# Patient Record
Sex: FEMALE | Race: BLACK OR AFRICAN AMERICAN | NOT HISPANIC OR LATINO | Employment: OTHER | URBAN - NONMETROPOLITAN AREA
[De-identification: names, ages, dates, MRNs, and addresses within clinical notes are randomized per-mention and may not be internally consistent; named-entity substitution may affect disease eponyms.]

---

## 2017-01-11 ENCOUNTER — OFFICE VISIT (OUTPATIENT)
Dept: ENDOCRINOLOGY | Facility: CLINIC | Age: 62
End: 2017-01-11

## 2017-01-11 VITALS
DIASTOLIC BLOOD PRESSURE: 80 MMHG | HEART RATE: 70 BPM | BODY MASS INDEX: 44.32 KG/M2 | SYSTOLIC BLOOD PRESSURE: 140 MMHG | HEIGHT: 65 IN | WEIGHT: 266 LBS

## 2017-01-11 DIAGNOSIS — E04.1 NONTOXIC THYROID NODULE: Primary | ICD-10-CM

## 2017-01-11 DIAGNOSIS — E55.9 VITAMIN D DEFICIENCY: ICD-10-CM

## 2017-01-11 DIAGNOSIS — K59.00 CONSTIPATION, UNSPECIFIED CONSTIPATION TYPE: ICD-10-CM

## 2017-01-11 DIAGNOSIS — E06.3 HASHIMOTO'S DISEASE: ICD-10-CM

## 2017-01-11 DIAGNOSIS — E78.2 MIXED HYPERLIPIDEMIA: ICD-10-CM

## 2017-01-11 PROCEDURE — 99214 OFFICE O/P EST MOD 30 MIN: CPT | Performed by: NURSE PRACTITIONER

## 2017-01-11 NOTE — MR AVS SNAPSHOT
Rosa M Ferguson   1/11/2017 10:15 AM   Office Visit    Dept Phone:  [phone]   Encounter #:  [id number]    Provider:  RACHEL Zabala   Department:  [facility] ENDOCRINOLOGY                Your Full Care Plan              Your Updated Medication List          This list is accurate as of: 1/11/17 10:51 AM.  Always use your most recent med list.                atorvastatin 20 MG tablet   Commonly known as:  LIPITOR       BROMPHENIRAMINE-PSEUDOEPH PO       cefprozil 250 MG tablet   Commonly known as:  CEFZIL       desloratadine 5 MG tablet   Commonly known as:  CLARINEX       meloxicam 15 MG tablet   Commonly known as:  MOBIC   Take 1 tablet by mouth Daily. Take once daily.       NASONEX 50 MCG/ACT nasal spray   Generic drug:  mometasone       * Thyroid 30 MG tablet   Commonly known as:  ARMOUR       * Thyroid 60 MG tablet   Commonly known as:  ARMOUR       * thyroid 15 MG tablet   Commonly known as:  ARMOUR THYROID   Take 3 tabs in the am (45 mg) and 1 tab around 4pm (15mg )       vitamin D 25677 UNITS capsule capsule   Commonly known as:  ERGOCALCIFEROL       * Notice:  This list has 3 medication(s) that are the same as other medications prescribed for you. Read the directions carefully, and ask your doctor or other care provider to review them with you.            You Were Diagnosed With        Codes Comments    Nontoxic thyroid nodule    -  Primary ICD-10-CM: E04.1  ICD-9-CM: 241.0       Instructions     None    Patient Instructions History      Upcoming Appointments     Visit Type Date Time Department    FOLLOW UP 1/11/2017 10:15 AM [facility]    FOLLOW UP 7/11/2017  9:45 AM [facility]      [facility] Signup     [facility] [employer] allows you to send messages to your doctor, view your test results, renew your prescriptions, schedule appointments, and more. To sign up, go to [employer] and click on the Sign Up Now link  "in the New User? box. Enter your [employer] Activation Code exactly as it appears below along with the last four digits of your Social Security Number and your Date of Birth () to complete the sign-up process. If you do not sign up before the expiration date, you must request a new code.    [employer] Activation Code: [email or web address]  Expires: 2017 10:51 AM    If you have questions, you can email [email or web address] or call [phone] to talk to our [employer] staff. Remember, [employer] is NOT to be used for urgent needs. For medical emergencies, dial 911.               Other Info from Your Visit           Your Appointments     2017  9:45 AM CDT   Follow Up with RACHEL Zabala   [facility] MEDICAL [facility] ENDOCRINOLOGY (--)    [address]  [place] [address] [place] [id number] [phone]           Arrive 15 minutes prior to appointment.              Allergies     No Known Allergies      Reason for Visit     Hypothyroidism calvin      Vital Signs     Blood Pressure Pulse Height Weight Body Mass Index Smoking Status    140/80 (BP Location: Left arm, Patient Position: Sitting, Cuff Size: Adult) 70 65\" (165.1 cm) 266 lb (121 kg) 44.26 kg/m2 Never Smoker      Problems and Diagnoses Noted     Nontoxic thyroid nodule        "

## 2017-01-11 NOTE — PROGRESS NOTES
Subjective    Rosa M Ferguson is a 61 y.o. female. she is here today for follow-up.    History of Present Illness       History of Present Illness  61 year old female comes for follow up of hypothyroidism diagnosed in 1987.    She was on  brandname synthroid 150 mcgs daily that she takes every morning on an empty stomach.       since last appt has lost 3 lbs    In addition she has noticed visible goiter but US is stable    She denies eye pain,  red eye, diplopia, of eyelid edema.    since last appt changed to armour thyroid and states she does feels some better      In regards to bone health, she doesn't drink milk . She occasionally eats yogurt.       She has vitamin D Deficiency but hasn't been taking it consistently     She has dyslipidemia on pravachol --changed to atorovastatin--she does not remember to take everynight            The following portions of the patient's history were reviewed and updated as appropriate:   Past Medical History   Diagnosis Date   • Acquired hypothyroidism    • Astigmatism    • Contact dermatitis due to plants    • Dyslipidemia    • Elevated blood pressure reading without diagnosis of hypertension    • Generalized anxiety disorder    • Goiter    • Hashimoto's thyroiditis    • History of varicose veins    • Hypothyroidism    • Multinodular goiter    • Myopia    • Obesity    • Pain in eye      etiol unknown      • Plantar fasciitis    • Rash    • Thyroid nodule    • Upper respiratory infection    • Urinary tract infectious disease    • Vitamin D deficiency      Past Surgical History   Procedure Laterality Date   • Cystoscopy  09/23/1999     Cystoscopy and left retrograde study. Stone passed with uretherocele.   • Injection of medication  06/14/2016     Kenalog (1)     • Tubal abdominal ligation  01/25/1992     Bilateral partial salpingectomy. Desires sterilization.     Family History   Problem Relation Age of Onset   • Breast cancer Other    • Cancer Other    • Coronary artery  disease Other    • Diabetes Other      OB History     No data available        Current Outpatient Prescriptions   Medication Sig Dispense Refill   • atorvastatin (LIPITOR) 20 MG tablet Take 20 mg by mouth Every Night.     • BROMPHENIRAMINE-PSEUDOEPH PO Take 5 mL by mouth Every 6 (Six) Hours As Needed. brompheniramine-pseudoephedrine-DM 2 mg-30 mg-10 mg/5 mL syrup.     • cefprozil (CEFZIL) 250 MG tablet Take 250 mg by mouth 2 (Two) Times a Day.     • desloratadine (CLARINEX) 5 MG tablet Take 5 mg by mouth Daily.     • meloxicam (MOBIC) 15 MG tablet Take 1 tablet by mouth Daily. Take once daily. 30 tablet 0   • mometasone (NASONEX) 50 MCG/ACT nasal spray 2 sprays into each nostril Daily. Spray in each nostril.     • thyroid (ARMOUR THYROID) 15 MG tablet Take 3 tabs in the am (45 mg) and 1 tab around 4pm (15mg ) 120 tablet 5   • thyroid 30 MG PO tablet Take 30 mg by mouth Daily. Take daily at 4pm.     • thyroid 60 MG PO tablet Take 60 mg by mouth Every Morning.     • vitamin D (ERGOCALCIFEROL) 23191 UNITS capsule capsule Take 50,000 Units by mouth Every 7 (Seven) Days.       No current facility-administered medications for this visit.      No Known Allergies  Social History     Social History   • Marital status: Single     Spouse name: N/A   • Number of children: N/A   • Years of education: N/A     Social History Main Topics   • Smoking status: Never Smoker   • Smokeless tobacco: None   • Alcohol use No   • Drug use: None   • Sexual activity: Not Asked     Other Topics Concern   • None     Social History Narrative       Review of Systems  Review of Systems   Constitutional: Negative for activity change, appetite change, chills, diaphoresis and fatigue.   HENT: Negative for congestion, dental problem, drooling, ear discharge, ear pain, facial swelling, sneezing, sore throat, tinnitus, trouble swallowing and voice change.    Eyes: Negative for photophobia, pain, discharge, redness, itching and visual disturbance.  "  Respiratory: Negative for apnea, cough, choking, chest tightness and shortness of breath.    Cardiovascular: Negative for chest pain, palpitations and leg swelling.   Gastrointestinal: Negative for abdominal distention, abdominal pain, constipation, diarrhea, nausea and vomiting.   Endocrine: Negative for cold intolerance, heat intolerance, polydipsia, polyphagia and polyuria.   Genitourinary: Negative for difficulty urinating, dysuria, frequency, hematuria and urgency.   Musculoskeletal: Negative for arthralgias, back pain, gait problem, joint swelling, myalgias, neck pain and neck stiffness.   Skin: Negative for color change, pallor, rash and wound.   Allergic/Immunologic: Negative for environmental allergies, food allergies and immunocompromised state.   Neurological: Negative for dizziness, tremors, facial asymmetry, weakness, light-headedness, numbness and headaches.   Hematological: Negative for adenopathy. Does not bruise/bleed easily.   Psychiatric/Behavioral: Negative for agitation, behavioral problems, confusion, decreased concentration and sleep disturbance.        Objective      Visit Vitals   • /80 (BP Location: Left arm, Patient Position: Sitting, Cuff Size: Adult)   • Pulse 70   • Ht 65\" (165.1 cm)   • Wt 266 lb (121 kg)   • BMI 44.26 kg/m2     Physical Exam   Constitutional: She is oriented to person, place, and time. She appears well-developed and well-nourished. No distress.   HENT:   Head: Normocephalic and atraumatic.   Right Ear: External ear normal.   Left Ear: External ear normal.   Nose: Nose normal.   Eyes: Conjunctivae and EOM are normal. Pupils are equal, round, and reactive to light.   Neck: Normal range of motion. Neck supple. No tracheal deviation present. Thyromegaly present.   30 gram gland   Cardiovascular: Normal rate, regular rhythm and normal heart sounds.    No murmur heard.  Pulmonary/Chest: Effort normal and breath sounds normal. No respiratory distress. She has no " wheezes.   Abdominal: Soft. Bowel sounds are normal. There is no tenderness. There is no rebound and no guarding.   Musculoskeletal: Normal range of motion. She exhibits no edema, tenderness or deformity.   Neurological: She is alert and oriented to person, place, and time. No cranial nerve deficit.   Skin: Skin is warm and dry. No rash noted.   Psychiatric: She has a normal mood and affect. Her behavior is normal. Judgment and thought content normal.       Lab Review  GLUCOSE (mg/dl)   Date Value   01/04/2017 86   06/07/2016 86   12/01/2015 81     SODIUM (mmol/L)   Date Value   01/04/2017 139   06/07/2016 142   12/01/2015 141     POTASSIUM (mmol/L)   Date Value   01/04/2017 3.8   06/07/2016 4.1   12/01/2015 4.2     CHLORIDE (mmol/L)   Date Value   01/04/2017 104   06/07/2016 103   12/01/2015 105     CO2 (mmol/L)   Date Value   01/04/2017 23   06/07/2016 26   12/01/2015 25     BUN (mg/dl)   Date Value   01/04/2017 15   06/07/2016 13   12/01/2015 16     CREATININE (mg/dl)   Date Value   01/04/2017 0.7   06/07/2016 0.6   12/01/2015 0.6     TRIGLYCERIDES (mg/dl)   Date Value   06/07/2016 67   12/01/2015 56   05/19/2015 60       Assessment/Plan      1. Nontoxic thyroid nodule    2. Mixed hyperlipidemia    3. Hashimoto's disease    4. Vitamin D deficiency    5. Constipation, unspecified constipation type    .    Medications prescribed:  Outpatient Encounter Prescriptions as of 1/11/2017   Medication Sig Dispense Refill   • atorvastatin (LIPITOR) 20 MG tablet Take 20 mg by mouth Every Night.     • BROMPHENIRAMINE-PSEUDOEPH PO Take 5 mL by mouth Every 6 (Six) Hours As Needed. brompheniramine-pseudoephedrine-DM 2 mg-30 mg-10 mg/5 mL syrup.     • cefprozil (CEFZIL) 250 MG tablet Take 250 mg by mouth 2 (Two) Times a Day.     • desloratadine (CLARINEX) 5 MG tablet Take 5 mg by mouth Daily.     • meloxicam (MOBIC) 15 MG tablet Take 1 tablet by mouth Daily. Take once daily. 30 tablet 0   • mometasone (NASONEX) 50 MCG/ACT nasal  spray 2 sprays into each nostril Daily. Spray in each nostril.     • thyroid (ARMOUR THYROID) 15 MG tablet Take 3 tabs in the am (45 mg) and 1 tab around 4pm (15mg ) 120 tablet 5   • thyroid 30 MG PO tablet Take 30 mg by mouth Daily. Take daily at 4pm.     • thyroid 60 MG PO tablet Take 60 mg by mouth Every Morning.     • vitamin D (ERGOCALCIFEROL) 70584 UNITS capsule capsule Take 50,000 Units by mouth Every 7 (Seven) Days.       No facility-administered encounter medications on file as of 1/11/2017.        Orders placed during this encounter include:  Orders Placed This Encounter   Procedures   • Comprehensive Metabolic Panel   • Lipid Panel   • Vitamin D 25 Hydroxy   • TSH       Plan Details  Hypothyroidism    Euthyroid on LT4 at 150 mcgs daily     wants to try armour thyroid    start with 90 mcgs daily , do 60 a.m. and 30 p.m.     July 2014    TSH - nl    Oct. 2014    TSH - nl    continue armour thyroid 60 in the am and 30 around 4 pm    May 2015    TSH - 2.05    keep armour 60 in the am and 30 pm    Nov. 2015    TSH - nl      2012  barium swallow and PFT to evaluate for extrinsic compression were normal    Her Thyroid US from Sept 2011 was personally reviewed ( goiter with subcentimeter nodules ) - repeat for Oct 2013 enlarged gland , pseudonodules, left inf cyst of 1.3 cms , no need for FNA--will need repeat U/S -- -    oct. 2015    thyroid u/s     stable , questionable area in the left inferior lobe     repeat u/s in 6 months--schedule for thyroid nodule, goiter    June 2016    TSH - 0.23    keep armour thyroid 60mg in the am and decrease to 15mg in the pm    Jan. 2017    TSH - 2.9    Keep current dosage    June 2016    Reviewed by Dr. Munoz the left 1.8 cm cystic thyroid nodule did measure 1.5 cm --appears low risk for malignancy repeat in 6 months - scheduled for Jan. 13, 2017    --  I requested sleep study due to fatigue, elevated blood pressure, daytime sleepiness and snoring but she prefers not to do it  at this time.     --    Vid D def -     4-14 at 20 on 50 th q 2 weeks , sometimes forgetting.    increase to weekly --please try to remember daily    July 2014  VitD - 19    Oct. 2014    Vit d - 31    change to once every 2 weeks    May 2015    Vit d - 26.4       June 2016    vit d - 26.2    cannot remember vitamin d weekly    change to OTC 2000 units daily        --    Dyslipidemia  4-14    HDL 60s  LDL 170s  Tg nl      pravachol 40 mg qhs   stop     start atorvastatin 20 mg qhs -- does not take     May 2015    Total chol - 249  TG- 60  HDL - 61  LDL - 176    our target is to be less than 160   not taking  please remember to take     Nov. 2015    LDL - 180    not taking cholesterol pill    June 2016    total chol - 224  Tg- 67  HDL - 58  LDL _ 153    not taking cholesterol med--- please take daily      -------------------    Bowel habit changes     Now having problems constipation that has never had before     She never has had a colonoscopy     Increase water, fiber intake    Will refer to GI         4. Follow-up: Return in about 6 months (around 7/11/2017) for Recheck.

## 2017-01-13 ENCOUNTER — HOSPITAL ENCOUNTER (OUTPATIENT)
Dept: OTHER | Facility: HOSPITAL | Age: 62
Discharge: HOME | End: 2017-01-13
Attending: INTERNAL MEDICINE | Admitting: INTERNAL MEDICINE

## 2017-01-16 DIAGNOSIS — E04.1 SOLITARY THYROID NODULE: Primary | ICD-10-CM

## 2017-01-25 ENCOUNTER — HOSPITAL ENCOUNTER (OUTPATIENT)
Dept: ULTRASOUND IMAGING | Facility: HOSPITAL | Age: 62
Discharge: HOME | End: 2017-01-25
Attending: INTERNAL MEDICINE | Admitting: INTERNAL MEDICINE

## 2017-01-25 DIAGNOSIS — E04.1 SOLITARY THYROID NODULE: ICD-10-CM

## 2017-01-25 PROCEDURE — 88173 CYTOPATH EVAL FNA REPORT: CPT | Performed by: PATHOLOGY

## 2017-01-25 PROCEDURE — 76942 ECHO GUIDE FOR BIOPSY: CPT | Performed by: INTERNAL MEDICINE

## 2017-01-25 PROCEDURE — 10022 US GUIDED THYROID BIOPSY: CPT | Performed by: INTERNAL MEDICINE

## 2017-01-25 PROCEDURE — 76942 ECHO GUIDE FOR BIOPSY: CPT

## 2017-01-25 PROCEDURE — 88173 CYTOPATH EVAL FNA REPORT: CPT | Performed by: INTERNAL MEDICINE

## 2017-01-26 LAB
LAB AP CASE REPORT: NORMAL
LAB AP DIAGNOSIS COMMENT: NORMAL
LAB AP NON-GYN SPECIMEN ADEQUACY: NORMAL
Lab: NORMAL
PATH REPORT.FINAL DX SPEC: NORMAL

## 2017-02-27 ENCOUNTER — OFFICE VISIT (OUTPATIENT)
Dept: GASTROENTEROLOGY | Facility: CLINIC | Age: 62
End: 2017-02-27

## 2017-02-27 VITALS
WEIGHT: 267.1 LBS | BODY MASS INDEX: 44.5 KG/M2 | DIASTOLIC BLOOD PRESSURE: 74 MMHG | HEART RATE: 57 BPM | HEIGHT: 65 IN | SYSTOLIC BLOOD PRESSURE: 149 MMHG

## 2017-02-27 DIAGNOSIS — K59.00 CONSTIPATION, UNSPECIFIED CONSTIPATION TYPE: Primary | ICD-10-CM

## 2017-02-27 DIAGNOSIS — R19.4 CHANGE IN BOWEL HABITS: ICD-10-CM

## 2017-02-27 PROCEDURE — 99204 OFFICE O/P NEW MOD 45 MIN: CPT | Performed by: NURSE PRACTITIONER

## 2017-02-27 RX ORDER — SODIUM, POTASSIUM,MAG SULFATES 17.5-3.13G
1 SOLUTION, RECONSTITUTED, ORAL ORAL EVERY 12 HOURS
Qty: 2 BOTTLE | Refills: 0 | Status: ON HOLD | OUTPATIENT
Start: 2017-02-27 | End: 2017-03-06

## 2017-02-27 RX ORDER — DEXTROSE AND SODIUM CHLORIDE 5; .45 G/100ML; G/100ML
30 INJECTION, SOLUTION INTRAVENOUS CONTINUOUS PRN
Status: CANCELLED | OUTPATIENT
Start: 2017-02-27

## 2017-02-27 RX ORDER — LACTULOSE 10 G/15ML
10 SOLUTION ORAL 2 TIMES DAILY PRN
Qty: 473 ML | Refills: 1 | Status: SHIPPED | OUTPATIENT
Start: 2017-02-27 | End: 2017-02-27

## 2017-03-06 ENCOUNTER — ANESTHESIA EVENT (OUTPATIENT)
Dept: GASTROENTEROLOGY | Facility: HOSPITAL | Age: 62
End: 2017-03-06

## 2017-03-06 ENCOUNTER — ANESTHESIA (OUTPATIENT)
Dept: GASTROENTEROLOGY | Facility: HOSPITAL | Age: 62
End: 2017-03-06

## 2017-03-06 ENCOUNTER — HOSPITAL ENCOUNTER (OUTPATIENT)
Facility: HOSPITAL | Age: 62
Setting detail: HOSPITAL OUTPATIENT SURGERY
Discharge: HOME | End: 2017-03-06
Attending: INTERNAL MEDICINE | Admitting: INTERNAL MEDICINE

## 2017-03-06 VITALS
TEMPERATURE: 96.3 F | WEIGHT: 260.58 LBS | HEIGHT: 65 IN | BODY MASS INDEX: 43.42 KG/M2 | HEART RATE: 63 BPM | OXYGEN SATURATION: 98 % | RESPIRATION RATE: 18 BRPM | DIASTOLIC BLOOD PRESSURE: 59 MMHG | SYSTOLIC BLOOD PRESSURE: 114 MMHG

## 2017-03-06 DIAGNOSIS — K59.00 CONSTIPATION, UNSPECIFIED CONSTIPATION TYPE: ICD-10-CM

## 2017-03-06 PROCEDURE — 88305 TISSUE EXAM BY PATHOLOGIST: CPT | Performed by: PATHOLOGY

## 2017-03-06 PROCEDURE — 25010000002 PROPOFOL 10 MG/ML EMULSION: Performed by: NURSE ANESTHETIST, CERTIFIED REGISTERED

## 2017-03-06 PROCEDURE — 45378 DIAGNOSTIC COLONOSCOPY: CPT | Performed by: INTERNAL MEDICINE

## 2017-03-06 PROCEDURE — 88305 TISSUE EXAM BY PATHOLOGIST: CPT | Performed by: INTERNAL MEDICINE

## 2017-03-06 RX ORDER — ONDANSETRON 2 MG/ML
4 INJECTION INTRAMUSCULAR; INTRAVENOUS ONCE AS NEEDED
Status: DISCONTINUED | OUTPATIENT
Start: 2017-03-06 | End: 2017-03-06 | Stop reason: HOSPADM

## 2017-03-06 RX ORDER — MIDAZOLAM HYDROCHLORIDE 1 MG/ML
INJECTION INTRAMUSCULAR; INTRAVENOUS AS NEEDED
Status: DISCONTINUED | OUTPATIENT
Start: 2017-03-06 | End: 2017-03-06

## 2017-03-06 RX ORDER — PROMETHAZINE HYDROCHLORIDE 25 MG/ML
12.5 INJECTION, SOLUTION INTRAMUSCULAR; INTRAVENOUS ONCE AS NEEDED
Status: DISCONTINUED | OUTPATIENT
Start: 2017-03-06 | End: 2017-03-06 | Stop reason: HOSPADM

## 2017-03-06 RX ORDER — PROMETHAZINE HYDROCHLORIDE 25 MG/1
25 TABLET ORAL ONCE AS NEEDED
Status: DISCONTINUED | OUTPATIENT
Start: 2017-03-06 | End: 2017-03-06 | Stop reason: HOSPADM

## 2017-03-06 RX ORDER — PROPOFOL 10 MG/ML
VIAL (ML) INTRAVENOUS AS NEEDED
Status: DISCONTINUED | OUTPATIENT
Start: 2017-03-06 | End: 2017-03-06 | Stop reason: SURG

## 2017-03-06 RX ORDER — PROMETHAZINE HYDROCHLORIDE 25 MG/1
25 SUPPOSITORY RECTAL ONCE AS NEEDED
Status: DISCONTINUED | OUTPATIENT
Start: 2017-03-06 | End: 2017-03-06 | Stop reason: HOSPADM

## 2017-03-06 RX ORDER — DEXTROSE AND SODIUM CHLORIDE 5; .45 G/100ML; G/100ML
30 INJECTION, SOLUTION INTRAVENOUS CONTINUOUS PRN
Status: DISCONTINUED | OUTPATIENT
Start: 2017-03-06 | End: 2017-03-06 | Stop reason: HOSPADM

## 2017-03-06 RX ORDER — DEXAMETHASONE SODIUM PHOSPHATE 4 MG/ML
8 INJECTION, SOLUTION INTRA-ARTICULAR; INTRALESIONAL; INTRAMUSCULAR; INTRAVENOUS; SOFT TISSUE ONCE AS NEEDED
Status: DISCONTINUED | OUTPATIENT
Start: 2017-03-06 | End: 2017-03-06 | Stop reason: HOSPADM

## 2017-03-06 RX ADMIN — PROPOFOL 50 MG: 10 INJECTION, EMULSION INTRAVENOUS at 17:01

## 2017-03-06 RX ADMIN — PROPOFOL 100 MG: 10 INJECTION, EMULSION INTRAVENOUS at 16:55

## 2017-03-06 RX ADMIN — PROPOFOL 20 MG: 10 INJECTION, EMULSION INTRAVENOUS at 17:15

## 2017-03-06 RX ADMIN — PROPOFOL 50 MG: 10 INJECTION, EMULSION INTRAVENOUS at 17:06

## 2017-03-06 RX ADMIN — DEXTROSE AND SODIUM CHLORIDE 30 ML/HR: 5; 450 INJECTION, SOLUTION INTRAVENOUS at 16:22

## 2017-03-06 RX ADMIN — PROPOFOL 50 MG: 10 INJECTION, EMULSION INTRAVENOUS at 17:10

## 2017-03-06 NOTE — PLAN OF CARE
Problem: Patient Care Overview (Adult)  Goal: Plan of Care Review  Outcome: Outcome(s) achieved Date Met:  03/06/17 03/06/17 1757   Coping/Psychosocial Response Interventions   Plan Of Care Reviewed With patient   Patient Care Overview   Progress no change   Outcome Evaluation   Outcome Summary/Follow up Plan ready for d/c         Problem: GI Endoscopy (Adult)  Goal: Signs and Symptoms of Listed Potential Problems Will be Absent or Manageable (GI Endoscopy)  Outcome: Outcome(s) achieved Date Met:  03/06/17 03/06/17 1757   GI Endoscopy   Problems Assessed (GI Endoscopy) all   Problems Present (GI Endoscopy) none

## 2017-03-06 NOTE — PLAN OF CARE
Problem: Patient Care Overview (Adult)  Goal: Plan of Care Review  Outcome: Ongoing (interventions implemented as appropriate)    03/06/17 1724   Coping/Psychosocial Response Interventions   Plan Of Care Reviewed With patient   Patient Care Overview   Progress no change   Outcome Evaluation   Outcome Summary/Follow up Plan vss         Problem: GI Endoscopy (Adult)  Goal: Signs and Symptoms of Listed Potential Problems Will be Absent or Manageable (GI Endoscopy)  Outcome: Ongoing (interventions implemented as appropriate)    03/06/17 1724   GI Endoscopy   Problems Assessed (GI Endoscopy) all   Problems Present (GI Endoscopy) none

## 2017-03-06 NOTE — ANESTHESIA POSTPROCEDURE EVALUATION
Patient: Rosa M Ferguson    Procedure Summary     Date Anesthesia Start Anesthesia Stop Room / Location    03/06/17 [id number] [facility] ENDOSCOPY 1 / [facility] ENDOSCOPY       Procedure Diagnosis Surgeon Provider    COLONOSCOPY (N/A ) Constipation, unspecified constipation type  (Constipation, unspecified constipation type [K59.00]) MD Jennifer Madison CRNA          Anesthesia Type: MAC  Last vitals  BP      Temp      Pulse     Resp      SpO2        Post Anesthesia Care and Evaluation    Patient location during evaluation: bedside  Patient participation: complete - patient participated  Level of consciousness: awake and alert  Pain score: 0  Pain management: adequate  Airway patency: patent  Anesthetic complications: No anesthetic complications  PONV Status: none  Cardiovascular status: acceptable  Respiratory status: acceptable  Hydration status: acceptable

## 2017-03-06 NOTE — H&P (VIEW-ONLY)
Chief Complaint   Patient presents with   • Constipation     change in bowel habits       Subjective    Rosa M Ferguson is a 62 y.o. female. she is being seen for consultation today at the request of RACHEL Kay   Constipation   This is a chronic problem. The current episode started more than 1 month ago. The problem has been gradually worsening since onset. Her stool frequency is 2 to 3 times per week. The patient is on a high fiber diet. She exercises regularly. There has been adequate water intake. Associated symptoms include bloating. Pertinent negatives include no abdominal pain, anorexia, back pain, diarrhea, difficulty urinating, fecal incontinence, fever, flatus, hemorrhoids, melena, nausea, rectal pain, vomiting or weight loss. Risk factors: Reports symptoms started after pelvic surgery in 2011.  She has tried fiber, diet changes and laxatives for the symptoms. The treatment provided mild relief.    62-year-old female presents to discuss changes in bowel habits and constipation that is worsened in the last few months.  Date symptoms started when she had surgery in 2011 for a prolapsed bladder.  She denies any abdominal pain, nausea, vomiting she has had issues try to use the bathroom for the last 2-3 weeks has tried to increase water exercise and fruit juices and fiber daily.  Has tried over-the-counter MiraLAX it seemed to only and increase her gas.  Has never had a colonoscopy.  Denies any family history of colorectal cancer.  She denies any melena or hematochezia.  Reports since surgery she feels pressure in her vagina when she has an urge to have a bowel movement.  Plan; schedule colonoscopy, lactulose twice a day as needed for constipation.      The following portions of the patient's history were reviewed and updated as appropriate:   Past Medical History   Diagnosis Date   • Acquired hypothyroidism    • Astigmatism    • Contact dermatitis due to plants    • Dyslipidemia    • Elevated blood  pressure reading without diagnosis of hypertension    • Generalized anxiety disorder    • Goiter    • Hashimoto's thyroiditis    • History of varicose veins    • Hypothyroidism    • Multinodular goiter    • Myopia    • Obesity    • Pain in eye      etiol unknown      • Plantar fasciitis    • Rash    • Thyroid nodule    • Upper respiratory infection    • Urinary tract infectious disease    • Vitamin D deficiency      Past Surgical History   Procedure Laterality Date   • Cystoscopy  09/23/1999     Cystoscopy and left retrograde study. Stone passed with uretherocele.   • Injection of medication  06/14/2016     Kenalog (1)     • Tubal abdominal ligation  01/25/1992     Bilateral partial salpingectomy. Desires sterilization.     Family History   Problem Relation Age of Onset   • Breast cancer Other    • Cancer Other    • Coronary artery disease Other    • Diabetes Other    • Cancer Mother    • Heart disease Father    • Stroke Paternal Uncle      OB History     No data available        Current Outpatient Prescriptions   Medication Sig Dispense Refill   • atorvastatin (LIPITOR) 20 MG tablet Take 20 mg by mouth Every Night.     • meloxicam (MOBIC) 15 MG tablet Take 1 tablet by mouth Daily. Take once daily. 30 tablet 0   • thyroid (ARMOUR THYROID) 15 MG tablet Take 3 tabs in the am (45 mg) and 1 tab around 4pm (15mg ) 120 tablet 5   • vitamin D (ERGOCALCIFEROL) 39204 UNITS capsule capsule Take 50,000 Units by mouth Every 7 (Seven) Days.     • lactulose (CHRONULAC) 10 GM/15ML solution Take 15 mL by mouth 2 (Two) Times a Day As Needed (Constipation). 473 mL 1   • sodium-potassium-magnesium sulfates (SUPREP BOWEL PREP) solution oral solution Take 1 bottle by mouth Every 12 (Twelve) Hours. 2 bottle 0   • thyroid 60 MG PO tablet Take 60 mg by mouth Every Morning.       No current facility-administered medications for this visit.      No Known Allergies  Social History     Social History   • Marital status: Single     Spouse name:  "N/A   • Number of children: N/A   • Years of education: N/A     Social History Main Topics   • Smoking status: Never Smoker   • Smokeless tobacco: Not on file   • Alcohol use Yes      Comment: some   • Drug use: No   • Sexual activity: Not on file     Other Topics Concern   • Not on file     Social History Narrative       Review of Systems  Review of Systems   Constitutional: Negative for activity change, appetite change, chills, diaphoresis, fatigue, fever, unexpected weight change and weight loss.   HENT: Negative for sore throat and trouble swallowing.    Respiratory: Negative for shortness of breath.    Gastrointestinal: Positive for bloating and constipation. Negative for abdominal distention, abdominal pain, anal bleeding, anorexia, blood in stool, diarrhea, flatus, hemorrhoids, melena, nausea, rectal pain and vomiting.   Genitourinary: Negative for difficulty urinating.   Musculoskeletal: Negative for arthralgias and back pain.   Skin: Negative for pallor.   Neurological: Negative for light-headedness.        Visit Vitals   • /74   • Pulse 57   • Ht 65\" (165.1 cm)   • Wt 267 lb 1.6 oz (121 kg)   • BMI 44.45 kg/m2       Objective    Physical Exam   Constitutional: She is oriented to person, place, and time. She appears well-developed and well-nourished. She is cooperative. No distress.   HENT:   Head: Normocephalic and atraumatic.   Neck: Normal range of motion. Neck supple. No thyromegaly present.   Cardiovascular: Normal rate, regular rhythm and normal heart sounds.    Pulmonary/Chest: Effort normal and breath sounds normal. She has no wheezes. She has no rhonchi. She has no rales.   Abdominal: Soft. Normal appearance and bowel sounds are normal. She exhibits no shifting dullness, no distension, no fluid wave and no ascites. There is no hepatosplenomegaly. There is no tenderness. There is no rigidity and no guarding. No hernia.   Lymphadenopathy:     She has no cervical adenopathy.   Neurological: She " is alert and oriented to person, place, and time.   Skin: Skin is warm, dry and intact. No rash noted. No pallor.   Psychiatric: She has a normal mood and affect. Her speech is normal.     Hospital Outpatient Visit on 01/25/2017   Component Date Value Ref Range Status   • Case Report 01/25/2017    Final                    Value:Medical Cytology Report                           Case: [id number]                                 Authorizing Provider:  Harry Person,   Collected:           01/25/2017 02:24 PM                                 MD                                                                           Ordering Location:     [facility]             Received:            01/25/2017 02:46 PM                                 [facility]                                                              Pathologist:           Basil Youngblood MD                                                         Specimen:    Thyroid, left                                                                             • Final Diagnosis 01/25/2017    Final                    Value:This result contains rich text formatting which cannot be displayed here.   • Comment 01/25/2017    Final                    Value:This result contains rich text formatting which cannot be displayed here.   • Specimen Adequacy 01/25/2017 Unsatisfactory for evaluation. See comment.   Final    This result contains rich text formatting which cannot be displayed here.     Assessment/Plan      1. Constipation, unspecified constipation type    2. Change in bowel habits    .     Orders placed during this encounter include:      COLONOSCOPY (N/A)    Review and/or summary of lab tests, radiology, procedures, medications. Review and summary of old records and obtaining of history. The risks and benefits of my recommendations, as well as other treatment options were discussed with the patient today. Questions were answered.    New Medications Ordered  This Visit   Medications   • sodium-potassium-magnesium sulfates (SUPREP BOWEL PREP) solution oral solution     Sig: Take 1 bottle by mouth Every 12 (Twelve) Hours.     Dispense:  2 bottle     Refill:  0   • lactulose (CHRONULAC) 10 GM/15ML solution     Sig: Take 15 mL by mouth 2 (Two) Times a Day As Needed (Constipation).     Dispense:  473 mL     Refill:  1       Follow-up: Return in about 4 weeks (around 3/27/2017).          This document has been electronically signed by RACHEL Lowry on February 27, 2017 11:29 AM             Results for orders placed or performed during the hospital encounter of 01/25/17   Fine Needle Aspiration   Result Value Ref Range    Case Report       Medical Cytology Report                           Case: [id number]                                 Authorizing Provider:  Harry Person,   Collected:           01/25/2017 02:24 PM                                 MD                                                                           Ordering Location:     [facility]             Received:            01/25/2017 02:46 PM                                 [facility]                                                              Pathologist:           Basil Youngblood MD                                                         Specimen:    Thyroid, left                                                                              Final Diagnosis       [facility] Category: NON-DIAGNOSTIC               Comment       Follicular cells not identified. Abundant lymphocytes and colloid present.       Specimen Adequacy Unsatisfactory for evaluation. See comment.     Embedded Images     Results for orders placed or performed during the hospital encounter of 01/04/17   Manual Differential   Result Value Ref Range    Neutrophil Rel % 37 37 - 80 %    Bands Rel %  2 (L) 3 - 5 %    Lymphocyte Rel % 47 10 - 50 %    Monocyte Rel % 11 0 - 12 %    Eosinophil Rel % 1 0 - 7 %     Basophil Rel % 2 0 - 2 %    RBC Morphology Normochromic Slight Anisocytosis Occ Polychromia     Platelet Estimate Normal     Total Counted 100    CBC & Differential   Result Value Ref Range    WBC 4.6 3.2 - 9.8 x1000/uL    RBC 4.34 3.77 - 5.16 mikey/mm3    Hemoglobin 13.1 12.0 - 15.5 gm/dl    Hematocrit 39.1 35.0 - 45.0 %    MCV 90.1 80.0 - 98.0 fl    MCH 30.2 26.0 - 34.0 pg    MCHC 33.5 31.4 - 36.0 gm/dl    RDW 13.2 11.5 - 14.5 %    Platelets 252 150 - 450 x1000/mm3    MPV 10.9 8.0 - 12.0 fl    Neutrophil Rel % 39.4 37.0 - 80.0 %    Lymphocyte Rel % 47.6 10.0 - 50.0 %    Monocyte Rel % 10.4 0.0 - 12.0 %    Eosinophil Rel % 2.4 0.0 - 7.0 %    Basophil Rel % 0.2 0.0 - 2.0 %    Immature Granulocyte Rel % 0.00 0.00 - 0.50 %    Neutrophils Absolute 1.82 (L) 2.00 - 8.60 x1000/uL    Lymphocytes Absolute 2.20 0.60 - 4.20 x1000/uL    Monocytes Absolute 0.48 0.00 - 0.90 x1000/uL    Eosinophils Absolute 0.11 0.00 - 0.70 x1000/uL    Basophils Absolute 0.01 0.00 - 0.20 x1000/uL    Immature Granulocytes Absolute 0.000 (L) 0.005 - 0.022 x1000/uL    nRBC 0.0 0.0 - 0.2 %    nRBC 0.000 x1000/uL   TSH   Result Value Ref Range    TSH 2.92 0.46 - 4.68 uIU/ml   Comprehensive Metabolic Panel   Result Value Ref Range    Sodium 139 137 - 145 mmol/L    Potassium 3.8 3.5 - 5.1 mmol/L    Chloride 104 95 - 110 mmol/L    CO2 23 22 - 31 mmol/L    Anion Gap 12.0 5.0 - 15.0 mmol/L    Glucose 86 60 - 100 mg/dl    BUN 15 7 - 21 mg/dl    Creatinine 0.7 0.5 - 1.0 mg/dl    GFR MDRD Non  85 45 - 104 mL/min/1.73 sq.M    GFR MDRD  103 45 - 104 mL/min/1.73 sq.M    Calcium 9.2 8.4 - 10.2 mg/dl    Total Protein 7.9 6.3 - 8.6 gm/dl    Albumin 3.9 3.4 - 4.8 gm/dl    Total Bilirubin 0.6 0.2 - 1.3 mg/dl    Alkaline Phosphatase 98 38 - 126 U/L    AST (SGOT) 24 14 - 36 U/L    ALT (SGPT) 36 9 - 52 U/L   Results for orders placed or performed during the hospital encounter of 08/26/16   TSH   Result Value Ref Range    TSH 0.43 (L) 0.46  - 4.68 uIU/ml   Results for orders placed or performed during the hospital encounter of 06/07/16   Vitamin D 25 hydroxy   Result Value Ref Range    25 Hydroxy, Vitamin D 24.2 (L) 30.0 - 100.0 ng/ml   Manual Differential   Result Value Ref Range    Neutrophil Rel % 37 37 - 80 %    Lymphocyte Rel % 60 (H) 10 - 50 %    Eosinophil Rel % 3 0 - 7 %    RBC Morphology Normocytic Normochromic     Platelet Estimate Normal     Total Counted 100    CBC and Differential   Result Value Ref Range    WBC 4.7 3.2 - 9.8 x1000/uL    RBC 4.09 3.77 - 5.16 mikey/mm3    Hemoglobin 12.1 12.0 - 15.5 gm/dl    Hematocrit 36.4 35.0 - 45.0 %    MCV 89.0 80.0 - 98.0 fl    MCH 29.6 26.0 - 34.0 pg    MCHC 33.2 31.4 - 36.0 gm/dl    RDW 13.6 11.5 - 14.5 %    Platelets 242 150 - 450 x1000/mm3    MPV 11.2 8.0 - 12.0 fl    Neutrophil Rel % 36.0 (L) 37.0 - 80.0 %    Lymphocyte Rel % 50.1 (H) 10.0 - 50.0 %    Monocyte Rel % 9.7 0.0 - 12.0 %    Eosinophil Rel % 3.8 0.0 - 7.0 %    Basophil Rel % 0.2 0.0 - 2.0 %    Immature Granulocyte Rel % 0.20 0.00 - 0.50 %    Neutrophils Absolute 1.70 (L) 2.00 - 8.60 x1000/uL    Lymphocytes Absolute 2.37 0.60 - 4.20 x1000/uL    Monocytes Absolute 0.46 0.00 - 0.90 x1000/uL    Eosinophils Absolute 0.18 0.00 - 0.70 x1000/uL    Basophils Absolute 0.01 0.00 - 0.20 x1000/uL    Immature Granulocytes Absolute 0.010 0.005 - 0.022 x1000/uL    nRBC 0.0 0.0 - 0.2 %    nRBC 0.000 x1000/uL   TSH   Result Value Ref Range    TSH 0.26 (L) 0.46 - 4.68 uIU/ml   T4, free   Result Value Ref Range    Free T4 1.12 0.78 - 2.19 ng/dl   Lipid panel   Result Value Ref Range    Total Cholesterol 224 (H) 0 - 199 mg/dl    Triglycerides 67 20 - 199 mg/dl    HDL Cholesterol 58 (L) 60 - 200 mg/dl    LDL Cholesterol  153 (H) 0 - 129 mg/dl   Comprehensive metabolic panel   Result Value Ref Range    Sodium 142 137 - 145 mmol/L    Potassium 4.1 3.5 - 5.1 mmol/L    Chloride 103 95 - 110 mmol/L    CO2 26 22 - 31 mmol/L    Anion Gap 13.0 5.0 - 15.0 mmol/L     Glucose 86 60 - 100 mg/dl    BUN 13 7 - 21 mg/dl    Creatinine 0.6 0.5 - 1.0 mg/dl    GFR MDRD Non  102 45 - 104 mL/min/1.73 sq.M    GFR MDRD  123 (H) 45 - 104 mL/min/1.73 sq.M    Calcium 9.5 8.4 - 10.2 mg/dl    Total Protein 7.6 6.3 - 8.6 gm/dl    Albumin 3.9 3.4 - 4.8 gm/dl    Total Bilirubin 0.5 0.2 - 1.3 mg/dl    Alkaline Phosphatase 96 38 - 126 U/L    AST (SGOT) 29 14 - 36 U/L    ALT (SGPT) 39 9 - 52 U/L     *Note: Due to a large number of results and/or encounters for the requested time period, some results have not been displayed. A complete set of results can be found in Results Review.

## 2017-03-06 NOTE — ANESTHESIA PREPROCEDURE EVALUATION
Anesthesia Evaluation     NPO Status: > 4 hours   Airway   Mallampati: I  TM distance: >3 FB  Neck ROM: full  no difficulty expected  Dental - normal exam     Pulmonary - normal exam   (+) recent URI,   Cardiovascular - negative cardio ROS and normal exam        Neuro/Psych- negative ROS  GI/Hepatic/Renal/Endo    (+) obesity,  hypothyroidism,     Musculoskeletal (-) negative ROS    Abdominal    Substance History - negative use     OB/GYN negative ob/gyn ROS         Other - negative ROS                                   Anesthesia Plan    ASA 2     MAC     Anesthetic plan and risks discussed with patient.

## 2017-03-13 LAB
LAB AP CASE REPORT: NORMAL
Lab: NORMAL
PATH REPORT.FINAL DX SPEC: NORMAL
PATH REPORT.GROSS SPEC: NORMAL

## 2017-05-03 ENCOUNTER — OFFICE VISIT (OUTPATIENT)
Dept: GASTROENTEROLOGY | Facility: CLINIC | Age: 62
End: 2017-05-03

## 2017-05-03 VITALS
HEIGHT: 65 IN | BODY MASS INDEX: 44.45 KG/M2 | DIASTOLIC BLOOD PRESSURE: 83 MMHG | HEART RATE: 55 BPM | SYSTOLIC BLOOD PRESSURE: 155 MMHG | WEIGHT: 266.8 LBS

## 2017-05-03 DIAGNOSIS — K59.00 CONSTIPATION, UNSPECIFIED CONSTIPATION TYPE: Primary | ICD-10-CM

## 2017-05-03 PROCEDURE — 99213 OFFICE O/P EST LOW 20 MIN: CPT | Performed by: NURSE PRACTITIONER

## 2017-05-03 RX ORDER — LACTULOSE 10 G/15ML
20 SOLUTION ORAL 2 TIMES DAILY
Qty: 473 ML | Refills: 3 | Status: SHIPPED | OUTPATIENT
Start: 2017-05-03 | End: 2017-08-29 | Stop reason: ALTCHOICE

## 2017-07-03 ENCOUNTER — APPOINTMENT (OUTPATIENT)
Dept: LAB | Facility: HOSPITAL | Age: 62
End: 2017-07-03

## 2017-07-03 LAB
25(OH)D3 SERPL-MCNC: 36.5 NG/ML (ref 30–100)
ALBUMIN SERPL-MCNC: 4.3 G/DL (ref 3.4–4.8)
ALBUMIN/GLOB SERPL: 1 G/DL (ref 1.1–1.8)
ALP SERPL-CCNC: 112 U/L (ref 38–126)
ALT SERPL W P-5'-P-CCNC: 39 U/L (ref 9–52)
ANION GAP SERPL CALCULATED.3IONS-SCNC: 13 MMOL/L (ref 5–15)
ARTICHOKE IGE QN: 153 MG/DL (ref 1–129)
AST SERPL-CCNC: 32 U/L (ref 14–36)
BASOPHILS # BLD AUTO: 0.02 10*3/MM3 (ref 0–0.2)
BASOPHILS NFR BLD AUTO: 0.4 % (ref 0–2)
BILIRUB SERPL-MCNC: 0.6 MG/DL (ref 0.2–1.3)
BUN BLD-MCNC: 12 MG/DL (ref 7–21)
BUN/CREAT SERPL: 16 (ref 7–25)
CALCIUM SPEC-SCNC: 9.3 MG/DL (ref 8.4–10.2)
CHLORIDE SERPL-SCNC: 104 MMOL/L (ref 95–110)
CHOLEST SERPL-MCNC: 278 MG/DL (ref 0–199)
CO2 SERPL-SCNC: 25 MMOL/L (ref 22–31)
CREAT BLD-MCNC: 0.75 MG/DL (ref 0.5–1)
DEPRECATED RDW RBC AUTO: 45.6 FL (ref 36.4–46.3)
EOSINOPHIL # BLD AUTO: 0.11 10*3/MM3 (ref 0–0.7)
EOSINOPHIL NFR BLD AUTO: 2.3 % (ref 0–7)
ERYTHROCYTE [DISTWIDTH] IN BLOOD BY AUTOMATED COUNT: 13.6 % (ref 11.5–14.5)
GFR SERPL CREATININE-BSD FRML MDRD: 95 ML/MIN/1.73 (ref 45–104)
GLOBULIN UR ELPH-MCNC: 4.2 GM/DL (ref 2.3–3.5)
GLUCOSE BLD-MCNC: 92 MG/DL (ref 60–100)
HCT VFR BLD AUTO: 38.4 % (ref 35–45)
HDLC SERPL-MCNC: 62 MG/DL (ref 60–200)
HGB BLD-MCNC: 12.7 G/DL (ref 12–15.5)
IMM GRANULOCYTES # BLD: 0.01 10*3/MM3 (ref 0–0.02)
IMM GRANULOCYTES NFR BLD: 0.2 % (ref 0–0.5)
LDLC/HDLC SERPL: 3.25 {RATIO} (ref 0–3.22)
LYMPHOCYTES # BLD AUTO: 2.41 10*3/MM3 (ref 0.6–4.2)
LYMPHOCYTES NFR BLD AUTO: 49.6 % (ref 10–50)
MCH RBC QN AUTO: 30.4 PG (ref 26.5–34)
MCHC RBC AUTO-ENTMCNC: 33.1 G/DL (ref 31.4–36)
MCV RBC AUTO: 91.9 FL (ref 80–98)
MONOCYTES # BLD AUTO: 0.37 10*3/MM3 (ref 0–0.9)
MONOCYTES NFR BLD AUTO: 7.6 % (ref 0–12)
NEUTROPHILS # BLD AUTO: 1.94 10*3/MM3 (ref 2–8.6)
NEUTROPHILS NFR BLD AUTO: 39.9 % (ref 37–80)
PLATELET # BLD AUTO: 254 10*3/MM3 (ref 150–450)
PMV BLD AUTO: 11.6 FL (ref 8–12)
POTASSIUM BLD-SCNC: 3.8 MMOL/L (ref 3.5–5.1)
PROT SERPL-MCNC: 8.5 G/DL (ref 6.3–8.6)
RBC # BLD AUTO: 4.18 10*6/MM3 (ref 3.77–5.16)
SODIUM BLD-SCNC: 142 MMOL/L (ref 137–145)
TRIGL SERPL-MCNC: 73 MG/DL (ref 20–199)
TSH SERPL DL<=0.05 MIU/L-ACNC: 1.72 MIU/ML (ref 0.46–4.68)
WBC NRBC COR # BLD: 4.86 10*3/MM3 (ref 3.2–9.8)

## 2017-07-03 PROCEDURE — 80061 LIPID PANEL: CPT | Performed by: NURSE PRACTITIONER

## 2017-07-03 PROCEDURE — 84443 ASSAY THYROID STIM HORMONE: CPT | Performed by: NURSE PRACTITIONER

## 2017-07-03 PROCEDURE — 36415 COLL VENOUS BLD VENIPUNCTURE: CPT | Performed by: NURSE PRACTITIONER

## 2017-07-03 PROCEDURE — 82306 VITAMIN D 25 HYDROXY: CPT | Performed by: NURSE PRACTITIONER

## 2017-07-03 PROCEDURE — 80053 COMPREHEN METABOLIC PANEL: CPT | Performed by: NURSE PRACTITIONER

## 2017-07-03 PROCEDURE — 85025 COMPLETE CBC W/AUTO DIFF WBC: CPT | Performed by: NURSE PRACTITIONER

## 2017-07-11 ENCOUNTER — OFFICE VISIT (OUTPATIENT)
Dept: ENDOCRINOLOGY | Facility: CLINIC | Age: 62
End: 2017-07-11

## 2017-07-11 VITALS
DIASTOLIC BLOOD PRESSURE: 78 MMHG | SYSTOLIC BLOOD PRESSURE: 124 MMHG | HEIGHT: 65 IN | HEART RATE: 65 BPM | BODY MASS INDEX: 45.15 KG/M2 | WEIGHT: 271 LBS

## 2017-07-11 DIAGNOSIS — E78.2 MIXED HYPERLIPIDEMIA: ICD-10-CM

## 2017-07-11 DIAGNOSIS — E04.1 NONTOXIC THYROID NODULE: Primary | ICD-10-CM

## 2017-07-11 DIAGNOSIS — E06.3 HASHIMOTO'S DISEASE: ICD-10-CM

## 2017-07-11 DIAGNOSIS — E55.9 VITAMIN D DEFICIENCY: ICD-10-CM

## 2017-07-11 DIAGNOSIS — R14.0 BLOATING: ICD-10-CM

## 2017-07-11 PROCEDURE — 99214 OFFICE O/P EST MOD 30 MIN: CPT | Performed by: NURSE PRACTITIONER

## 2017-07-11 RX ORDER — LEVOTHYROXINE SODIUM 112 UG/1
112 CAPSULE ORAL DAILY
Qty: 90 CAPSULE | Refills: 3 | Status: SHIPPED | OUTPATIENT
Start: 2017-07-11 | End: 2017-08-15 | Stop reason: CLARIF

## 2017-07-11 NOTE — PROGRESS NOTES
Subjective    Rosa M Ferguson is a 62 y.o. female. she is here today for follow-up.    History of Present Illness       History of Present Illness  61 year old female comes for follow up of hypothyroidism diagnosed in 1987.     She was on  brandname synthroid 150 mcgs daily that she takes every morning on an empty stomach.         since last appt has lost 3 lbs     In addition she has noticed visible goiter but US is stable     She denies eye pain,  red eye, diplopia, of eyelid edema.     since last appt changed to armour thyroid and states she does feels some better        In regards to bone health, she doesn't drink milk . She occasionally eats yogurt.         She has vitamin D Deficiency but hasn't been taking it consistently      She has dyslipidemia on pravachol --changed to atorovastatin--she does not remember to take everynight          Evaluation history:  TSH   Date Value Ref Range Status   07/03/2017 1.720 0.460 - 4.680 mIU/mL Final     Free T4   Date Value Ref Range Status   06/07/2016 1.12 0.78 - 2.19 ng/dl Final       Current medications:  Current Outpatient Prescriptions   Medication Sig Dispense Refill   • atorvastatin (LIPITOR) 20 MG tablet Take 20 mg by mouth Every Night.     • lactulose (CHRONULAC) 10 GM/15ML solution Take 30 mL by mouth 2 (Two) Times a Day. 473 mL 3   • meloxicam (MOBIC) 15 MG tablet Take 1 tablet by mouth Daily. Take once daily. (Patient taking differently: Take 15 mg by mouth Daily As Needed. Take once daily.) 30 tablet 0   • thyroid (ARMOUR THYROID) 15 MG tablet Take 3 tabs in the am (45 mg) and 1 tab around 4pm (15mg ) 120 tablet 5   • TIROSINT 112 MCG capsule Take 1 capsule by mouth Daily. 90 capsule 3   • vitamin D (ERGOCALCIFEROL) 08292 UNITS capsule capsule Take 50,000 Units by mouth Every 7 (Seven) Days.       No current facility-administered medications for this visit.        The following portions of the patient's history were reviewed and updated as appropriate:    Past Medical History:   Diagnosis Date   • Acquired hypothyroidism    • Astigmatism    • Contact dermatitis due to plants    • Dyslipidemia    • Elevated blood pressure reading without diagnosis of hypertension    • Generalized anxiety disorder    • Goiter    • Hashimoto's thyroiditis    • History of varicose veins    • Hypothyroidism    • Multinodular goiter    • Myopia    • Obesity    • Pain in eye     etiol unknown      • Plantar fasciitis    • Rash    • Thyroid nodule    • Upper respiratory infection    • Urinary tract infectious disease    • Vitamin D deficiency      Past Surgical History:   Procedure Laterality Date   • BLADDER SUSPENSION  2011   • COLONOSCOPY N/A 3/6/2017    Procedure: COLONOSCOPY;  Surgeon: Edis Schaffer MD;  Location: [facility] ENDOSCOPY;  Service:    • CYSTOSCOPY  09/23/1999    Cystoscopy and left retrograde study. Stone passed with uretherocele.   • INJECTION OF MEDICATION  06/14/2016    Kenalog (1)     • TUBAL ABDOMINAL LIGATION  01/25/1992    Bilateral partial salpingectomy. Desires sterilization.     Family History   Problem Relation Age of Onset   • Breast cancer Other    • Cancer Other    • Coronary artery disease Other    • Diabetes Other    • Cancer Mother    • Heart disease Father    • Stroke Paternal Uncle      OB History     No data available        No Known Allergies  Social History     Social History   • Marital status: Single     Spouse name: N/A   • Number of children: N/A   • Years of education: N/A     Social History Main Topics   • Smoking status: Never Smoker   • Smokeless tobacco: Never Used   • Alcohol use Yes      Comment: socially   • Drug use: No   • Sexual activity: Not Asked     Other Topics Concern   • None     Social History Narrative       Review of Systems  Review of Systems   Constitutional: Negative for activity change, appetite change, chills, diaphoresis and fatigue.   HENT: Negative for congestion, dental problem, drooling, ear discharge, ear pain,  "facial swelling, sneezing, sore throat, tinnitus, trouble swallowing and voice change.    Eyes: Negative for photophobia, pain, discharge, redness, itching and visual disturbance.   Respiratory: Negative for apnea, cough, choking, chest tightness and shortness of breath.    Cardiovascular: Negative for chest pain, palpitations and leg swelling.   Gastrointestinal: Negative for abdominal distention, abdominal pain, constipation, diarrhea, nausea and vomiting.   Endocrine: Negative for cold intolerance, heat intolerance, polydipsia, polyphagia and polyuria.   Genitourinary: Negative for difficulty urinating, dysuria, frequency, hematuria and urgency.   Musculoskeletal: Negative for arthralgias, back pain, gait problem, joint swelling, myalgias, neck pain and neck stiffness.   Skin: Negative for color change, pallor, rash and wound.   Allergic/Immunologic: Negative for environmental allergies, food allergies and immunocompromised state.   Neurological: Negative for dizziness, tremors, facial asymmetry, weakness, light-headedness, numbness and headaches.   Hematological: Negative for adenopathy. Does not bruise/bleed easily.   Psychiatric/Behavioral: Negative for agitation, behavioral problems, confusion and sleep disturbance.        Objective    /78 (BP Location: Right arm, Patient Position: Sitting, Cuff Size: Adult)  Pulse 65  Ht 65\" (165.1 cm)  Wt 271 lb (123 kg)  BMI 45.1 kg/m2  Physical Exam   Constitutional: She is oriented to person, place, and time. She appears well-developed and well-nourished. No distress.   HENT:   Head: Normocephalic and atraumatic.   Right Ear: External ear normal.   Left Ear: External ear normal.   Nose: Nose normal.   Eyes: Conjunctivae and EOM are normal. Pupils are equal, round, and reactive to light.   Neck: Normal range of motion. Neck supple. No tracheal deviation present. No thyromegaly present.   Cardiovascular: Normal rate, regular rhythm and normal heart sounds.    No " murmur heard.  Pulmonary/Chest: Effort normal and breath sounds normal. No respiratory distress. She has no wheezes.   Abdominal: Soft. Bowel sounds are normal. There is no tenderness. There is no rebound and no guarding.   Musculoskeletal: Normal range of motion. She exhibits no edema, tenderness or deformity.   Neurological: She is alert and oriented to person, place, and time. No cranial nerve deficit.   Skin: Skin is warm and dry. No rash noted.   Psychiatric: She has a normal mood and affect. Her behavior is normal. Judgment and thought content normal.       Lab Review  Lab Results   Component Value Date    TSH 1.720 07/03/2017     Lab Results   Component Value Date    FREET4 1.12 06/07/2016        Assessment/Plan      1. Nontoxic thyroid nodule    2. Hashimoto's disease    3. Mixed hyperlipidemia    4. Vitamin D deficiency    5. Bloating    . This diagnosis was discussed and reviewed with the patient including the advantages of drug therapy.     1. Orders placed during this encounter include:  Orders Placed This Encounter   Procedures   • TSH   • Ambulatory Referral to Gastroenterology     Referral Priority:   Routine     Referral Type:   Consultation     Referral Reason:   Specialty Services Required     Referred to Provider:   Edis Schaffer MD     Requested Specialty:   Gastroenterology     Number of Visits Requested:   1       Medications prescribed:  Outpatient Encounter Prescriptions as of 7/11/2017   Medication Sig Dispense Refill   • atorvastatin (LIPITOR) 20 MG tablet Take 20 mg by mouth Every Night.     • lactulose (CHRONULAC) 10 GM/15ML solution Take 30 mL by mouth 2 (Two) Times a Day. 473 mL 3   • meloxicam (MOBIC) 15 MG tablet Take 1 tablet by mouth Daily. Take once daily. (Patient taking differently: Take 15 mg by mouth Daily As Needed. Take once daily.) 30 tablet 0   • thyroid (ARMOUR THYROID) 15 MG tablet Take 3 tabs in the am (45 mg) and 1 tab around 4pm (15mg ) 120 tablet 5   • TIROSINT 112  MCG capsule Take 1 capsule by mouth Daily. 90 capsule 3   • vitamin D (ERGOCALCIFEROL) 24496 UNITS capsule capsule Take 50,000 Units by mouth Every 7 (Seven) Days.       No facility-administered encounter medications on file as of 7/11/2017.      Plan Details  Hypothyroidism     Euthyroid on LT4 at 150 mcgs daily      wants to try armour thyroid     start with 90 mcgs daily , do 60 a.m. and 30 p.m.      July 2014     TSH - nl     Oct. 2014     TSH - nl     continue armour thyroid 60 in the am and 30 around 4 pm     May 2015     TSH - 2.05     keep armour 60 in the am and 30 pm     Nov. 2015     TSH - nl        2012  barium swallow and PFT to evaluate for extrinsic compression were normal     Her Thyroid US from Sept 2011 was personally reviewed ( goiter with subcentimeter nodules ) - repeat for Oct 2013 enlarged gland , pseudonodules, left inf cyst of 1.3 cms , no need for FNA--will need repeat U/S -- -     oct. 2015     thyroid u/s      stable , questionable area in the left inferior lobe      repeat u/s in 6 months--schedule for thyroid nodule, goiter     June 2016     TSH - 0.23     keep armour thyroid 60mg in the am and decrease to 15mg in the pm     Jan. 2017     TSH - 2.9     Keep current dosage    Lab Results   Component Value Date    TSH 1.720 07/03/2017     Wants to change to tirosint     tirosint 112 mcg daily     Repeat TSH in 4 weeks     June 2016     Reviewed by Dr. Munoz the left 1.8 cm cystic thyroid nodule did measure 1.5 cm --appears low risk for malignancy repeat in 6 months - scheduled for Jan. 13, 2017--biopsy consistent with underactive thyroid     Repeat u/s in Jan. 2018     --  I requested sleep study due to fatigue, elevated blood pressure, daytime sleepiness and snoring but she prefers not to do it at this time.      --     Vid D def -      4-14 at 20 on 50 th q 2 weeks , sometimes forgetting.     increase to weekly --please try to remember daily     July 2014  VitD - 19     Oct. 2014      Vit d - 31     change to once every 2 weeks     May 2015     Vit d - 26.4         June 2016     vit d - 26.2     cannot remember vitamin d weekly     change to OTC 2000 units daily    July 2017    Vitamin d - 36    Continue vitamin d            --     Dyslipidemia  4-14     HDL 60s  LDL 170s  Tg nl       pravachol 40 mg qhs   stop      start atorvastatin 20 mg qhs -- does not take      May 2015     Total chol - 249  TG- 60  HDL - 61  LDL - 176     our target is to be less than 160   not taking  please remember to take      Nov. 2015     LDL - 180     not taking cholesterol pill     June 2016     total chol - 224  Tg- 67  HDL - 58  LDL _ 153     not taking cholesterol med--- please take daily    Total Cholesterol   Date Value Ref Range Status   07/03/2017 278 (H) 0 - 199 mg/dL Final     Triglycerides   Date Value Ref Range Status   07/03/2017 73 20 - 199 mg/dL Final     HDL Cholesterol   Date Value Ref Range Status   07/03/2017 62 60 - 200 mg/dL Final     LDL Cholesterol    Date Value Ref Range Status   07/03/2017 153 (H) 1 - 129 mg/dL Final       Increase to Lipitor 40 mg one daily         -------------------     Bloating with food    Refer to GI          4. Return in about 6 months (around 1/11/2018) for Recheck.

## 2017-07-12 ENCOUNTER — TELEPHONE (OUTPATIENT)
Dept: ENDOCRINOLOGY | Facility: CLINIC | Age: 62
End: 2017-07-12

## 2017-07-19 ENCOUNTER — TELEPHONE (OUTPATIENT)
Dept: ENDOCRINOLOGY | Facility: CLINIC | Age: 62
End: 2017-07-19

## 2017-07-19 NOTE — TELEPHONE ENCOUNTER
Tried to call her and tell her that ins will not cover her med and her options are to go back to the generic levothyroxine or pay out of pocket for it.

## 2017-08-14 ENCOUNTER — LAB (OUTPATIENT)
Dept: LAB | Facility: HOSPITAL | Age: 62
End: 2017-08-14

## 2017-08-14 DIAGNOSIS — E06.3 HASHIMOTO'S THYROIDITIS: ICD-10-CM

## 2017-08-14 LAB
ALBUMIN SERPL-MCNC: 4.2 G/DL (ref 3.4–4.8)
ALBUMIN/GLOB SERPL: 1.1 G/DL (ref 1.1–1.8)
ALP SERPL-CCNC: 107 U/L (ref 38–126)
ALT SERPL W P-5'-P-CCNC: 35 U/L (ref 9–52)
ANION GAP SERPL CALCULATED.3IONS-SCNC: 9 MMOL/L (ref 5–15)
AST SERPL-CCNC: 29 U/L (ref 14–36)
BASOPHILS # BLD AUTO: 0.01 10*3/MM3 (ref 0–0.2)
BASOPHILS NFR BLD AUTO: 0.2 % (ref 0–2)
BILIRUB SERPL-MCNC: 0.5 MG/DL (ref 0.2–1.3)
BUN BLD-MCNC: 13 MG/DL (ref 7–21)
BUN/CREAT SERPL: 18.1 (ref 7–25)
CALCIUM SPEC-SCNC: 9.4 MG/DL (ref 8.4–10.2)
CHLORIDE SERPL-SCNC: 103 MMOL/L (ref 95–110)
CO2 SERPL-SCNC: 25 MMOL/L (ref 22–31)
CREAT BLD-MCNC: 0.72 MG/DL (ref 0.5–1)
DEPRECATED RDW RBC AUTO: 44.4 FL (ref 36.4–46.3)
EOSINOPHIL # BLD AUTO: 0.13 10*3/MM3 (ref 0–0.7)
EOSINOPHIL NFR BLD AUTO: 2.6 % (ref 0–7)
ERYTHROCYTE [DISTWIDTH] IN BLOOD BY AUTOMATED COUNT: 13.3 % (ref 11.5–14.5)
GFR SERPL CREATININE-BSD FRML MDRD: 100 ML/MIN/1.73 (ref 45–104)
GLOBULIN UR ELPH-MCNC: 3.9 GM/DL (ref 2.3–3.5)
GLUCOSE BLD-MCNC: 88 MG/DL (ref 60–100)
HCT VFR BLD AUTO: 38.5 % (ref 35–45)
HGB BLD-MCNC: 12.6 G/DL (ref 12–15.5)
IMM GRANULOCYTES # BLD: 0.01 10*3/MM3 (ref 0–0.02)
IMM GRANULOCYTES NFR BLD: 0.2 % (ref 0–0.5)
LYMPHOCYTES # BLD AUTO: 2.42 10*3/MM3 (ref 0.6–4.2)
LYMPHOCYTES NFR BLD AUTO: 48.9 % (ref 10–50)
MCH RBC QN AUTO: 30.1 PG (ref 26.5–34)
MCHC RBC AUTO-ENTMCNC: 32.7 G/DL (ref 31.4–36)
MCV RBC AUTO: 92.1 FL (ref 80–98)
MONOCYTES # BLD AUTO: 0.43 10*3/MM3 (ref 0–0.9)
MONOCYTES NFR BLD AUTO: 8.7 % (ref 0–12)
NEUTROPHILS # BLD AUTO: 1.95 10*3/MM3 (ref 2–8.6)
NEUTROPHILS NFR BLD AUTO: 39.4 % (ref 37–80)
PLATELET # BLD AUTO: 244 10*3/MM3 (ref 150–450)
PMV BLD AUTO: 11.6 FL (ref 8–12)
POTASSIUM BLD-SCNC: 3.7 MMOL/L (ref 3.5–5.1)
PROT SERPL-MCNC: 8.1 G/DL (ref 6.3–8.6)
RBC # BLD AUTO: 4.18 10*6/MM3 (ref 3.77–5.16)
SODIUM BLD-SCNC: 137 MMOL/L (ref 137–145)
TSH SERPL DL<=0.05 MIU/L-ACNC: 0.09 MIU/ML (ref 0.46–4.68)
WBC NRBC COR # BLD: 4.95 10*3/MM3 (ref 3.2–9.8)

## 2017-08-14 PROCEDURE — 84443 ASSAY THYROID STIM HORMONE: CPT | Performed by: NURSE PRACTITIONER

## 2017-08-14 PROCEDURE — 80053 COMPREHEN METABOLIC PANEL: CPT | Performed by: NURSE PRACTITIONER

## 2017-08-14 PROCEDURE — 36415 COLL VENOUS BLD VENIPUNCTURE: CPT | Performed by: NURSE PRACTITIONER

## 2017-08-14 PROCEDURE — 85025 COMPLETE CBC W/AUTO DIFF WBC: CPT | Performed by: NURSE PRACTITIONER

## 2017-08-15 ENCOUNTER — TELEPHONE (OUTPATIENT)
Dept: ENDOCRINOLOGY | Facility: CLINIC | Age: 62
End: 2017-08-15

## 2017-08-15 RX ORDER — LEVOTHYROXINE SODIUM 0.1 MG/1
100 TABLET ORAL DAILY
Qty: 30 TABLET | Refills: 5 | Status: SHIPPED | OUTPATIENT
Start: 2017-08-15 | End: 2018-01-15 | Stop reason: SDUPTHER

## 2017-08-29 ENCOUNTER — OFFICE VISIT (OUTPATIENT)
Dept: GASTROENTEROLOGY | Facility: CLINIC | Age: 62
End: 2017-08-29

## 2017-08-29 VITALS
HEART RATE: 51 BPM | SYSTOLIC BLOOD PRESSURE: 130 MMHG | WEIGHT: 268.2 LBS | BODY MASS INDEX: 44.68 KG/M2 | HEIGHT: 65 IN | DIASTOLIC BLOOD PRESSURE: 75 MMHG

## 2017-08-29 DIAGNOSIS — K59.00 CONSTIPATION, UNSPECIFIED CONSTIPATION TYPE: ICD-10-CM

## 2017-08-29 DIAGNOSIS — R10.13 EPIGASTRIC PAIN: Primary | ICD-10-CM

## 2017-08-29 DIAGNOSIS — R12 HEARTBURN: ICD-10-CM

## 2017-08-29 DIAGNOSIS — R68.81 EARLY SATIETY: ICD-10-CM

## 2017-08-29 PROCEDURE — 99214 OFFICE O/P EST MOD 30 MIN: CPT | Performed by: NURSE PRACTITIONER

## 2017-08-29 RX ORDER — DEXTROSE AND SODIUM CHLORIDE 5; .45 G/100ML; G/100ML
30 INJECTION, SOLUTION INTRAVENOUS CONTINUOUS PRN
Status: CANCELLED | OUTPATIENT
Start: 2017-10-09

## 2017-10-09 ENCOUNTER — HOSPITAL ENCOUNTER (OUTPATIENT)
Facility: HOSPITAL | Age: 62
Setting detail: HOSPITAL OUTPATIENT SURGERY
Discharge: HOME | End: 2017-10-09
Attending: INTERNAL MEDICINE | Admitting: INTERNAL MEDICINE

## 2017-10-09 ENCOUNTER — ANESTHESIA EVENT (OUTPATIENT)
Dept: GASTROENTEROLOGY | Facility: HOSPITAL | Age: 62
End: 2017-10-09

## 2017-10-09 ENCOUNTER — ANESTHESIA (OUTPATIENT)
Dept: GASTROENTEROLOGY | Facility: HOSPITAL | Age: 62
End: 2017-10-09

## 2017-10-09 VITALS
BODY MASS INDEX: 44.3 KG/M2 | RESPIRATION RATE: 18 BRPM | SYSTOLIC BLOOD PRESSURE: 114 MMHG | OXYGEN SATURATION: 97 % | DIASTOLIC BLOOD PRESSURE: 62 MMHG | HEIGHT: 65 IN | TEMPERATURE: 97.9 F | WEIGHT: 265.88 LBS | HEART RATE: 60 BPM

## 2017-10-09 DIAGNOSIS — R10.13 EPIGASTRIC PAIN: ICD-10-CM

## 2017-10-09 DIAGNOSIS — R68.81 EARLY SATIETY: ICD-10-CM

## 2017-10-09 DIAGNOSIS — R12 HEARTBURN: ICD-10-CM

## 2017-10-09 PROCEDURE — 88305 TISSUE EXAM BY PATHOLOGIST: CPT | Performed by: PATHOLOGY

## 2017-10-09 PROCEDURE — 43239 EGD BIOPSY SINGLE/MULTIPLE: CPT | Performed by: INTERNAL MEDICINE

## 2017-10-09 PROCEDURE — 25010000002 PROPOFOL 10 MG/ML EMULSION: Performed by: NURSE ANESTHETIST, CERTIFIED REGISTERED

## 2017-10-09 PROCEDURE — 88305 TISSUE EXAM BY PATHOLOGIST: CPT | Performed by: INTERNAL MEDICINE

## 2017-10-09 RX ORDER — DEXTROSE AND SODIUM CHLORIDE 5; .45 G/100ML; G/100ML
30 INJECTION, SOLUTION INTRAVENOUS CONTINUOUS PRN
Status: DISCONTINUED | OUTPATIENT
Start: 2017-10-09 | End: 2017-10-09 | Stop reason: HOSPADM

## 2017-10-09 RX ORDER — DEXTROSE AND SODIUM CHLORIDE 5; .45 G/100ML; G/100ML
20 INJECTION, SOLUTION INTRAVENOUS CONTINUOUS
Status: DISCONTINUED | OUTPATIENT
Start: 2017-10-09 | End: 2017-10-09 | Stop reason: HOSPADM

## 2017-10-09 RX ORDER — PROPOFOL 10 MG/ML
VIAL (ML) INTRAVENOUS AS NEEDED
Status: DISCONTINUED | OUTPATIENT
Start: 2017-10-09 | End: 2017-10-09 | Stop reason: SURG

## 2017-10-09 RX ORDER — LIDOCAINE HYDROCHLORIDE 10 MG/ML
INJECTION, SOLUTION INFILTRATION; PERINEURAL AS NEEDED
Status: DISCONTINUED | OUTPATIENT
Start: 2017-10-09 | End: 2017-10-09 | Stop reason: SURG

## 2017-10-09 RX ADMIN — PROPOFOL 40 MG: 10 INJECTION, EMULSION INTRAVENOUS at 15:19

## 2017-10-09 RX ADMIN — LIDOCAINE HYDROCHLORIDE 60 MG: 10 INJECTION, SOLUTION INFILTRATION; PERINEURAL at 15:17

## 2017-10-09 RX ADMIN — PROPOFOL 70 MG: 10 INJECTION, EMULSION INTRAVENOUS at 15:17

## 2017-10-09 RX ADMIN — DEXTROSE AND SODIUM CHLORIDE 20 ML/HR: 5; 450 INJECTION, SOLUTION INTRAVENOUS at 14:51

## 2017-10-09 RX ADMIN — PROPOFOL 40 MG: 10 INJECTION, EMULSION INTRAVENOUS at 15:21

## 2017-10-09 NOTE — PLAN OF CARE
Problem: Patient Care Overview (Adult)  Goal: Plan of Care Review  Outcome: Outcome(s) achieved Date Met:  10/09/17    10/09/17 1535   Coping/Psychosocial Response Interventions   Plan Of Care Reviewed With patient   Patient Care Overview   Progress no change   Outcome Evaluation   Outcome Summary/Follow up Plan vss, pt alert         Problem: GI Endoscopy (Adult)  Goal: Signs and Symptoms of Listed Potential Problems Will be Absent or Manageable (GI Endoscopy)  Outcome: Outcome(s) achieved Date Met:  10/09/17    10/09/17 1535   GI Endoscopy   Problems Assessed (GI Endoscopy) all   Problems Present (GI Endoscopy) none

## 2017-10-09 NOTE — ANESTHESIA POSTPROCEDURE EVALUATION
Patient: Rosa M Ferguson    Procedure Summary     Date Anesthesia Start Anesthesia Stop Room / Location    10/09/17 1514 1523 [facility] ENDOSCOPY 1 / [facility] ENDOSCOPY       Procedure Diagnosis Surgeon Provider    ESOPHAGOGASTRODUODENOSCOPY possible dilation  (N/A Esophagus) Heartburn; Early satiety; Epigastric pain  (Heartburn [R12]; Early satiety [R68.81]; Epigastric pain [R10.13]) MD Amy Madison CRNA          Anesthesia Type: MAC  Last vitals  BP   159/84 (10/09/17 1440)    Temp   98.2 °F (36.8 °C) (10/09/17 1440)    Pulse   66 (10/09/17 1440)   Resp   16 (10/09/17 1440)    SpO2   98 % (10/09/17 1440)      Post Anesthesia Care and Evaluation    Patient location during evaluation: bedside  Patient participation: complete - patient participated  Level of consciousness: awake and awake and alert  Pain score: 0  Pain management: satisfactory to patient  Airway patency: patent  Anesthetic complications: No anesthetic complications  PONV Status: none  Cardiovascular status: acceptable and stable  Respiratory status: acceptable, room air, unassisted and spontaneous ventilation  Hydration status: acceptable

## 2017-10-09 NOTE — H&P
Progress Notes  Encounter Date: 10/9/2017  Edis sr  Gastroenterology   Expand All Collapse All    []Hide copied text  []Hover for attribution information      Chief Complaint   Patient presents with   • Bloated            Subjective        Rosa M Ferguson is a 62 y.o. female. she is here today for follow-up.     History of Present Illness  62-year-old female seen to discuss persistent bloating and abdominal pain.  Date she feels like she is having constant fullness in her epigastric region associated with indigestion denies any nausea vomiting.  States bowel movements are still constipated and she uses lactulose as needed.     Plan; Will start patient on Linzess 72 MCG daily for chronic constipation.  Follow-up in 6 weeks return to office sooner if needed.  We'll schedule EGD with possible dilation due to epigastric pain and nausea.      The following portions of the patient's history were reviewed and updated as appropriate:    Medical History         Past Medical History:   Diagnosis Date   • Acquired hypothyroidism     • Astigmatism     • Contact dermatitis due to plants     • Dyslipidemia     • Elevated blood pressure reading without diagnosis of hypertension     • Generalized anxiety disorder     • Goiter     • Hashimoto's thyroiditis     • History of varicose veins     • Hypothyroidism     • Multinodular goiter     • Myopia     • Obesity     • Pain in eye       etiol unknown      • Plantar fasciitis     • Rash     • Thyroid nodule     • Upper respiratory infection     • Urinary tract infectious disease     • Vitamin D deficiency            Surgical History          Past Surgical History:   Procedure Laterality Date   • BLADDER SUSPENSION   2011   • COLONOSCOPY N/A 3/6/2017     Procedure: COLONOSCOPY;  Surgeon: Edis Sr MD;  Location: [facility] ENDOSCOPY;  Service:    • CYSTOSCOPY   09/23/1999     Cystoscopy and left retrograde study. Stone passed with uretherocele.   • INJECTION OF MEDICATION    06/14/2016     Kenalog (1)     • TUBAL ABDOMINAL LIGATION   01/25/1992     Bilateral partial salpingectomy. Desires sterilization.               Family History   Problem Relation Age of Onset   • Breast cancer Other     • Cancer Other     • Coronary artery disease Other     • Diabetes Other     • Cancer Mother     • Heart disease Father     • Stroke Paternal Uncle            OB History      No data available           Current Medications           Current Outpatient Prescriptions   Medication Sig Dispense Refill   • atorvastatin (LIPITOR) 20 MG tablet Take 20 mg by mouth Every Night.       • levothyroxine (SYNTHROID) 100 MCG tablet Take 1 tablet by mouth Daily. 30 tablet 5   • vitamin D (ERGOCALCIFEROL) 93224 UNITS capsule capsule Take 50,000 Units by mouth Every 7 (Seven) Days.       • linaclotide (LINZESS) 72 MCG capsule capsule Take 1 capsule by mouth Every Morning Before Breakfast. 30 capsule 5      No current facility-administered medications for this visit.          No Known Allergies   Social History    Social History            Social History   • Marital status: Single       Spouse name: N/A   • Number of children: N/A   • Years of education: N/A              Social History Main Topics    • Smoking status: Never Smoker    • Smokeless tobacco: Never Used    • Alcohol use Yes         Comment: socially    • Drug use: No    • Sexual activity: Not Asked            Other Topics Concern   • None      Social History Narrative            Review of Systems  Review of Systems   Constitutional: Positive for appetite change (early satiety ). Negative for activity change, chills, diaphoresis, fatigue, fever and unexpected weight change.   HENT: Negative for sore throat and trouble swallowing.    Respiratory: Negative for shortness of breath.    Gastrointestinal: Positive for abdominal distention (bloating epigastric region ) and constipation. Negative for abdominal pain, anal bleeding, blood in stool, diarrhea, nausea,  "rectal pain and vomiting.   Musculoskeletal: Negative for arthralgias.   Skin: Negative for pallor.   Neurological: Negative for light-headedness.                              /75 (BP Location: Right arm, Patient Position: Sitting, Cuff Size: Adult)  Pulse 51  Ht 65\" (165.1 cm)  Wt 268 lb 3.2 oz (122 kg)  BMI 44.63 kg/m2        Objective        Physical Exam   Constitutional: She is oriented to person, place, and time. She appears well-developed and well-nourished. She is cooperative. No distress.   HENT:   Head: Normocephalic and atraumatic.   Neck: Normal range of motion. Neck supple. No thyromegaly present.   Cardiovascular: Normal rate, regular rhythm and normal heart sounds.    Pulmonary/Chest: Effort normal and breath sounds normal. She has no wheezes. She has no rhonchi. She has no rales.   Abdominal: Soft. Normal appearance and bowel sounds are normal. She exhibits no shifting dullness and no distension. There is no hepatosplenomegaly. There is no tenderness. There is no rigidity and no guarding. No hernia.   Lymphadenopathy:     She has no cervical adenopathy.   Neurological: She is alert and oriented to person, place, and time.   Skin: Skin is warm, dry and intact. No rash noted. No pallor.   Psychiatric: She has a normal mood and affect. Her speech is normal.              Lab on 08/14/2017   Component Date Value Ref Range Status   • Glucose 08/14/2017 88  60 - 100 mg/dL Final   • BUN 08/14/2017 13  7 - 21 mg/dL Final   • Creatinine 08/14/2017 0.72  0.50 - 1.00 mg/dL Final   • Sodium 08/14/2017 137  137 - 145 mmol/L Final   • Potassium 08/14/2017 3.7  3.5 - 5.1 mmol/L Final   • Chloride 08/14/2017 103  95 - 110 mmol/L Final   • CO2 08/14/2017 25.0  22.0 - 31.0 mmol/L Final   • Calcium 08/14/2017 9.4  8.4 - 10.2 mg/dL Final   • Total Protein 08/14/2017 8.1  6.3 - 8.6 g/dL Final   • Albumin 08/14/2017 4.20  3.40 - 4.80 g/dL Final   • ALT (SGPT) 08/14/2017 35  9 - 52 U/L Final   • AST (SGOT) " 08/14/2017 29  14 - 36 U/L Final   • Alkaline Phosphatase 08/14/2017 107  38 - 126 U/L Final   • Total Bilirubin 08/14/2017 0.5  0.2 - 1.3 mg/dL Final   • eGFR   Amer 08/14/2017 100  45 - 104 mL/min/1.73 Final   • Globulin 08/14/2017 3.9* 2.3 - 3.5 gm/dL Final   • A/G Ratio 08/14/2017 1.1  1.1 - 1.8 g/dL Final   • BUN/Creatinine Ratio 08/14/2017 18.1  7.0 - 25.0 Final   • Anion Gap 08/14/2017 9.0  5.0 - 15.0 mmol/L Final   • WBC 08/14/2017 4.95  3.20 - 9.80 10*3/mm3 Final   • RBC 08/14/2017 4.18  3.77 - 5.16 10*6/mm3 Final   • Hemoglobin 08/14/2017 12.6  12.0 - 15.5 g/dL Final   • Hematocrit 08/14/2017 38.5  35.0 - 45.0 % Final   • MCV 08/14/2017 92.1  80.0 - 98.0 fL Final   • MCH 08/14/2017 30.1  26.5 - 34.0 pg Final   • MCHC 08/14/2017 32.7  31.4 - 36.0 g/dL Final   • RDW 08/14/2017 13.3  11.5 - 14.5 % Final   • RDW-SD 08/14/2017 44.4  36.4 - 46.3 fl Final   • MPV 08/14/2017 11.6  8.0 - 12.0 fL Final   • Platelets 08/14/2017 244  150 - 450 10*3/mm3 Final   • Neutrophil % 08/14/2017 39.4  37.0 - 80.0 % Final   • Lymphocyte % 08/14/2017 48.9  10.0 - 50.0 % Final   • Monocyte % 08/14/2017 8.7  0.0 - 12.0 % Final   • Eosinophil % 08/14/2017 2.6  0.0 - 7.0 % Final   • Basophil % 08/14/2017 0.2  0.0 - 2.0 % Final   • Immature Grans % 08/14/2017 0.2  0.0 - 0.5 % Final   • Neutrophils, Absolute 08/14/2017 1.95* 2.00 - 8.60 10*3/mm3 Final   • Lymphocytes, Absolute 08/14/2017 2.42  0.60 - 4.20 10*3/mm3 Final   • Monocytes, Absolute 08/14/2017 0.43  0.00 - 0.90 10*3/mm3 Final   • Eosinophils, Absolute 08/14/2017 0.13  0.00 - 0.70 10*3/mm3 Final   • Basophils, Absolute 08/14/2017 0.01  0.00 - 0.20 10*3/mm3 Final   • Immature Grans, Absolute 08/14/2017 0.01  0.00 - 0.02 10*3/mm3 Final         Assessment/Plan           1. Epigastric pain    2. Heartburn    3. Constipation, unspecified constipation type    4. Early satiety    .         Orders placed during this encounter include:     ESOPHAGOGASTRODUODENOSCOPY possible  dilation  (N/A)     Review and/or summary of lab tests, radiology, procedures, medications. Review and summary of old records and obtaining of history. The risks and benefits of my recommendations, as well as other treatment options were discussed with the patient today. Questions were answered.          New Medications Ordered This Visit   Medications   • linaclotide (LINZESS) 72 MCG capsule capsule       Sig: Take 1 capsule by mouth Every Morning Before Breakfast.       Dispense:  30 capsule       Refill:  5          This document has been electronically signed by Edis Schaffer MD on October 9, 2017 2:24 PM             Results for orders placed or performed in visit on 08/14/17   CBC Auto Differential   Result Value Ref Range     WBC 4.95 3.20 - 9.80 10*3/mm3     RBC 4.18 3.77 - 5.16 10*6/mm3     Hemoglobin 12.6 12.0 - 15.5 g/dL     Hematocrit 38.5 35.0 - 45.0 %     MCV 92.1 80.0 - 98.0 fL     MCH 30.1 26.5 - 34.0 pg     MCHC 32.7 31.4 - 36.0 g/dL     RDW 13.3 11.5 - 14.5 %     RDW-SD 44.4 36.4 - 46.3 fl     MPV 11.6 8.0 - 12.0 fL     Platelets 244 150 - 450 10*3/mm3     Neutrophil % 39.4 37.0 - 80.0 %     Lymphocyte % 48.9 10.0 - 50.0 %     Monocyte % 8.7 0.0 - 12.0 %     Eosinophil % 2.6 0.0 - 7.0 %     Basophil % 0.2 0.0 - 2.0 %     Immature Grans % 0.2 0.0 - 0.5 %     Neutrophils, Absolute 1.95 (L) 2.00 - 8.60 10*3/mm3     Lymphocytes, Absolute 2.42 0.60 - 4.20 10*3/mm3     Monocytes, Absolute 0.43 0.00 - 0.90 10*3/mm3     Eosinophils, Absolute 0.13 0.00 - 0.70 10*3/mm3     Basophils, Absolute 0.01 0.00 - 0.20 10*3/mm3     Immature Grans, Absolute 0.01 0.00 - 0.02 10*3/mm3   Comprehensive metabolic panel   Result Value Ref Range     Glucose 88 60 - 100 mg/dL     BUN 13 7 - 21 mg/dL     Creatinine 0.72 0.50 - 1.00 mg/dL     Sodium 137 137 - 145 mmol/L     Potassium 3.7 3.5 - 5.1 mmol/L     Chloride 103 95 - 110 mmol/L     CO2 25.0 22.0 - 31.0 mmol/L     Calcium 9.4 8.4 - 10.2 mg/dL     Total Protein 8.1 6.3  "- 8.6 g/dL     Albumin 4.20 3.40 - 4.80 g/dL     ALT (SGPT) 35 9 - 52 U/L     AST (SGOT) 29 14 - 36 U/L     Alkaline Phosphatase 107 38 - 126 U/L     Total Bilirubin 0.5 0.2 - 1.3 mg/dL     eGFR   Amer 100 45 - 104 mL/min/1.73     Globulin 3.9 (H) 2.3 - 3.5 gm/dL     A/G Ratio 1.1 1.1 - 1.8 g/dL     BUN/Creatinine Ratio 18.1 7.0 - 25.0     Anion Gap 9.0 5.0 - 15.0 mmol/L   Results for orders placed or performed in visit on 07/11/17   TSH   Result Value Ref Range     TSH 0.090 (L) 0.460 - 4.680 mIU/mL   Results for orders placed or performed during the hospital encounter of 03/06/17   Tissue Exam   Result Value Ref Range     Case Report           Surgical Pathology Report                         Case: [id number]                                  Authorizing Provider:  Edis Schaffer MD         Collected:           03/06/2017 05:23 PM          Ordering Location:     [facility]             Received:            03/07/2017 11:21 AM                                 [place] [facility]                                                     Pathologist:           Gilmar Acharya MD                                                            Specimen:    Large Intestine, colonic mucosa                                                           Final Diagnosis           COLONIC MUCOSA, RANDOM BIOPSY:      NO SIGNIFICANT HISTOLOGIC ABNORMALITY.     Gross Description           The specimen is labeled \"CM\" and consists of a tan fragment measuring 0.7 x 0.4 x 0.2 cm.  Totally submitted.     Embedded Images       Results for orders placed or performed during the hospital encounter of 01/25/17   Fine Needle Aspiration   Result Value Ref Range     Case Report           Medical Cytology Report                           Case: [id number]                                 Authorizing Provider:  Harry Person,   Collected:           01/25/2017 02:24 PM                                 MD                                   "                                         Ordering Location:     [facility]             Received:            01/25/2017 02:46 PM                                 [facility]                                                              Pathologist:           Basil Youngblood MD                                                         Specimen:    Thyroid, left                                                                             Final Diagnosis           [place] [facility] Category: NON-DIAGNOSTIC                  Comment           Follicular cells not identified. Abundant lymphocytes and colloid present.      Specimen Adequacy Unsatisfactory for evaluation. See comment.       Embedded Images       Results for orders placed or performed in visit on 01/11/17   CBC Auto Differential   Result Value Ref Range     WBC 4.86 3.20 - 9.80 10*3/mm3     RBC 4.18 3.77 - 5.16 10*6/mm3     Hemoglobin 12.7 12.0 - 15.5 g/dL     Hematocrit 38.4 35.0 - 45.0 %     MCV 91.9 80.0 - 98.0 fL     MCH 30.4 26.5 - 34.0 pg     MCHC 33.1 31.4 - 36.0 g/dL     RDW 13.6 11.5 - 14.5 %     RDW-SD 45.6 36.4 - 46.3 fl     MPV 11.6 8.0 - 12.0 fL     Platelets 254 150 - 450 10*3/mm3     Neutrophil % 39.9 37.0 - 80.0 %     Lymphocyte % 49.6 10.0 - 50.0 %     Monocyte % 7.6 0.0 - 12.0 %     Eosinophil % 2.3 0.0 - 7.0 %     Basophil % 0.4 0.0 - 2.0 %     Immature Grans % 0.2 0.0 - 0.5 %     Neutrophils, Absolute 1.94 (L) 2.00 - 8.60 10*3/mm3     Lymphocytes, Absolute 2.41 0.60 - 4.20 10*3/mm3     Monocytes, Absolute 0.37 0.00 - 0.90 10*3/mm3     Eosinophils, Absolute 0.11 0.00 - 0.70 10*3/mm3     Basophils, Absolute 0.02 0.00 - 0.20 10*3/mm3     Immature Grans, Absolute 0.01 0.00 - 0.02 10*3/mm3   Vitamin D 25 Hydroxy   Result Value Ref Range     25 Hydroxy, Vitamin D 36.5 30.0 - 100.0 ng/ml   TSH   Result Value Ref Range     TSH 1.720 0.460 - 4.680 mIU/mL   Lipid Panel   Result Value Ref Range     Total Cholesterol 278 (H) 0 - 199 mg/dL      Triglycerides 73 20 - 199 mg/dL     HDL Cholesterol 62 60 - 200 mg/dL     LDL Cholesterol  153 (H) 1 - 129 mg/dL     LDL/HDL Ratio 3.25 (H) 0.00 - 3.22   Comprehensive Metabolic Panel   Result Value Ref Range     Glucose 92 60 - 100 mg/dL     BUN 12 7 - 21 mg/dL     Creatinine 0.75 0.50 - 1.00 mg/dL     Sodium 142 137 - 145 mmol/L     Potassium 3.8 3.5 - 5.1 mmol/L     Chloride 104 95 - 110 mmol/L     CO2 25.0 22.0 - 31.0 mmol/L     Calcium 9.3 8.4 - 10.2 mg/dL     Total Protein 8.5 6.3 - 8.6 g/dL     Albumin 4.30 3.40 - 4.80 g/dL     ALT (SGPT) 39 9 - 52 U/L     AST (SGOT) 32 14 - 36 U/L     Alkaline Phosphatase 112 38 - 126 U/L     Total Bilirubin 0.6 0.2 - 1.3 mg/dL     eGFR   Amer 95 45 - 104 mL/min/1.73     Globulin 4.2 (H) 2.3 - 3.5 gm/dL     A/G Ratio 1.0 (L) 1.1 - 1.8 g/dL     BUN/Creatinine Ratio 16.0 7.0 - 25.0     Anion Gap 13.0 5.0 - 15.0 mmol/L      *Note: Due to a large number of results and/or encounters for the requested time period, some results have not been displayed. A complete set of results can be found in Results Review.               Office Visit on 8/29/2017              Detailed Report

## 2017-10-16 ENCOUNTER — TELEPHONE (OUTPATIENT)
Dept: ENDOCRINOLOGY | Facility: CLINIC | Age: 62
End: 2017-10-16

## 2017-10-16 RX ORDER — ERGOCALCIFEROL 1.25 MG/1
50000 CAPSULE ORAL
Qty: 12 CAPSULE | Refills: 1 | Status: SHIPPED | OUTPATIENT
Start: 2017-10-16 | End: 2018-01-15 | Stop reason: SDUPTHER

## 2017-10-16 NOTE — TELEPHONE ENCOUNTER
PT IS NEEDING A REFILL ON HER VITAMIN D AND SHE IS NEEDING A 90 DAY SUPPLY AND PLEASE SEND IT TO [facility]. THANK YOU TP. (Routing comment)

## 2017-10-17 ENCOUNTER — OFFICE VISIT (OUTPATIENT)
Dept: GASTROENTEROLOGY | Facility: CLINIC | Age: 62
End: 2017-10-17

## 2017-10-17 VITALS
HEIGHT: 65 IN | WEIGHT: 266.3 LBS | SYSTOLIC BLOOD PRESSURE: 106 MMHG | DIASTOLIC BLOOD PRESSURE: 74 MMHG | BODY MASS INDEX: 44.37 KG/M2

## 2017-10-17 DIAGNOSIS — K59.00 CONSTIPATION, UNSPECIFIED CONSTIPATION TYPE: ICD-10-CM

## 2017-10-17 DIAGNOSIS — K29.90 GASTRITIS AND DUODENITIS: ICD-10-CM

## 2017-10-17 DIAGNOSIS — K21.00 GASTROESOPHAGEAL REFLUX DISEASE WITH ESOPHAGITIS: Primary | ICD-10-CM

## 2017-10-17 PROCEDURE — 99214 OFFICE O/P EST MOD 30 MIN: CPT | Performed by: NURSE PRACTITIONER

## 2017-10-17 RX ORDER — PANTOPRAZOLE SODIUM 40 MG/1
40 TABLET, DELAYED RELEASE ORAL DAILY
Qty: 30 TABLET | Refills: 5 | Status: SHIPPED | OUTPATIENT
Start: 2017-10-17 | End: 2017-12-05 | Stop reason: DRUGHIGH

## 2017-10-17 NOTE — PROGRESS NOTES
Chief Complaint   Patient presents with   • Follow-up     Followup EGD results.       Subjective    Rosa M Ferguson is a 62 y.o. female. she is here today for follow-up.    Constipation   This is a chronic problem. The patient is on a high fiber diet. She exercises regularly. There has been adequate water intake. Associated symptoms include bloating. Pertinent negatives include no abdominal pain, diarrhea, fever, hematochezia, melena, nausea, rectal pain or vomiting.   Patient reports she tried Linzess for a few days however cause explosive diarrhea and she was unable to continue it.  She has increased her water and fruit juice intake and states bowel habits are more frequent.  States her stool was always very soft however he does take several days in between bowel movements and she will have to attempt to have vomiting for about 30 minutes before able to.  Currently denies any abdominal pain.  States she is still having frequent reflux symptoms describes it as a tongue burning sensation particularly worse after she drinks coffee in the morning.  Denies any nausea or vomiting.  She denies any dysphagia.  Her EGD was completed 10/9/17 and noted esophagitis, gastritis and normal duodenum.  On biopsy chronic gastritis, chronic esophagitis and duodenitis was noted.  No dysplasia or goblet cell metaplasia was noted.  Plan; we'll start patient on Protonix 40 mg per day regarding gastroesophageal reflux disease.  We'll follow up and 4-6 weeks.  Discontinue Linzess and continue current regimen for constipation.     The following portions of the patient's history were reviewed and updated as appropriate:   Past Medical History:   Diagnosis Date   • Acquired hypothyroidism    • Astigmatism    • Contact dermatitis due to plants    • Dyslipidemia    • Elevated blood pressure reading without diagnosis of hypertension    • Generalized anxiety disorder    • Goiter    • Hashimoto's thyroiditis    • History of varicose veins    •  Hypothyroidism    • Multinodular goiter    • Myopia    • Obesity    • Pain in eye     etiol unknown      • Plantar fasciitis    • Rash    • Thyroid nodule    • Upper respiratory infection    • Urinary tract infectious disease    • Vitamin D deficiency      Past Surgical History:   Procedure Laterality Date   • BLADDER SUSPENSION  2011   • COLONOSCOPY N/A 3/6/2017    Procedure: COLONOSCOPY;  Surgeon: Edis Schaffer MD;  Location: [facility] ENDOSCOPY;  Service:    • CYSTOSCOPY  09/23/1999    Cystoscopy and left retrograde study. Stone passed with uretherocele.   • ENDOSCOPY N/A 10/9/2017    Procedure: ESOPHAGOGASTRODUODENOSCOPY possible dilation ;  Surgeon: Edis Schaffer MD;  Location: [facility] ENDOSCOPY;  Service:    • INJECTION OF MEDICATION  06/14/2016    Kenalog (1)     • TUBAL ABDOMINAL LIGATION  01/25/1992    Bilateral partial salpingectomy. Desires sterilization.     Family History   Problem Relation Age of Onset   • Breast cancer Other    • Cancer Other    • Coronary artery disease Other    • Diabetes Other    • Cancer Mother    • Heart disease Father    • Stroke Paternal Uncle      OB History     No data available        Current Outpatient Prescriptions   Medication Sig Dispense Refill   • atorvastatin (LIPITOR) 20 MG tablet Take 20 mg by mouth Every Night.     • levothyroxine (SYNTHROID) 100 MCG tablet Take 1 tablet by mouth Daily. 30 tablet 5   • vitamin D (ERGOCALCIFEROL) 59315 units capsule capsule Take 1 capsule by mouth Every 7 (Seven) Days. 12 capsule 1   • pantoprazole (PROTONIX) 40 MG EC tablet Take 1 tablet by mouth Daily. 30 tablet 5     No current facility-administered medications for this visit.      No Known Allergies  Social History     Social History   • Marital status: Single     Spouse name: N/A   • Number of children: N/A   • Years of education: N/A     Social History Main Topics   • Smoking status: Never Smoker   • Smokeless tobacco: Never Used   • Alcohol use Yes      Comment: socially   •  "Drug use: No   • Sexual activity: Defer     Other Topics Concern   • None     Social History Narrative       Review of Systems  Review of Systems   Constitutional: Negative for activity change, appetite change, chills, diaphoresis, fatigue, fever and unexpected weight change.   HENT: Positive for trouble swallowing (reflux symptoms no dysphagia ). Negative for sore throat.    Respiratory: Negative for shortness of breath.    Gastrointestinal: Positive for bloating and constipation (better with increased water and fruit juices). Negative for abdominal distention, abdominal pain, anal bleeding, blood in stool, diarrhea, hematochezia, melena, nausea, rectal pain and vomiting.   Musculoskeletal: Negative for arthralgias.   Skin: Negative for pallor.   Neurological: Negative for light-headedness.        /74  Ht 65\" (165.1 cm)  Wt 266 lb 4.8 oz (121 kg)  BMI 44.31 kg/m2    Objective    Physical Exam   Constitutional: She is oriented to person, place, and time. She appears well-developed and well-nourished. She is cooperative. No distress.   HENT:   Head: Normocephalic and atraumatic.   Neck: Normal range of motion. Neck supple. No thyromegaly present.   Cardiovascular: Normal rate, regular rhythm and normal heart sounds.    Pulmonary/Chest: Effort normal and breath sounds normal. She has no wheezes. She has no rhonchi. She has no rales.   Abdominal: Soft. Normal appearance and bowel sounds are normal. She exhibits no shifting dullness and no distension. There is no hepatosplenomegaly. There is no tenderness. There is no rigidity and no guarding. No hernia.   Lymphadenopathy:     She has no cervical adenopathy.   Neurological: She is alert and oriented to person, place, and time.   Skin: Skin is warm, dry and intact. No rash noted. No pallor.   Psychiatric: She has a normal mood and affect. Her speech is normal.     Admission on 10/09/2017, Discharged on 10/09/2017   Component Date Value Ref Range Status   • Case " Report 10/09/2017    Final                    Value:Surgical Pathology Report                         Case: [id number]                                  Authorizing Provider:  Edis Schaffer MD         Collected:           10/09/2017 03:23 PM          Ordering Location:     [facility]             Received:            10/10/2017 08:16 AM                                 [place]                                                     Pathologist:           Basil Youngblood MD                                                         Specimens:   1) - Small Intestine, Duodenum, small bowel                                                         2) - Gastric, Antrum                                                                                3) - Esophagus, Distal                                                                    • Final Diagnosis 10/09/2017    Final                    Value:This result contains rich text formatting which cannot be displayed here.   • Gross Description 10/09/2017    Final                    Value:This result contains rich text formatting which cannot be displayed here.     Assessment/Plan      1. Gastroesophageal reflux disease with esophagitis    2. Gastritis and duodenitis    3. Constipation, unspecified constipation type    .     Review and/or summary of lab tests, radiology, procedures, medications. Review and summary of old records and obtaining of history. The risks and benefits of my recommendations, as well as other treatment options were discussed with the patient today. Questions were answered.    New Medications Ordered This Visit   Medications   • pantoprazole (PROTONIX) 40 MG EC tablet     Sig: Take 1 tablet by mouth Daily.     Dispense:  30 tablet     Refill:  5       Follow-up: Return in about 4 weeks (around 11/14/2017).          This document has been electronically signed by RACHEL Lowry on October 17, 2017 12:51 PM             Results for orders  placed or performed during the hospital encounter of 10/09/17   Tissue Pathology Exam - Tissue, Small Intestine, Duodenum   Result Value Ref Range    Case Report       Surgical Pathology Report                         Case: [id number]                                  Authorizing Provider:  Edis Schaffer MD         Collected:           10/09/2017 03:23 PM          Ordering Location:     [facility]             Received:            10/10/2017 08:16 AM                                 [place]                                                     Pathologist:           Basil Youngblood MD                                                         Specimens:   1) - Small Intestine, Duodenum, small bowel                                                         2) - Gastric, Antrum                                                                                3) - Esophagus, Distal                                                                     Final Diagnosis       1.  DUODENUM, MUCOSAL BIOPSY:  MILD CHRONIC INFLAMMATION.    2.  GASTRIC ANTRUM, MUCOSAL BIOPSY:  MILD CHRONIC GASTRITIS.  NO EVIDENCE OF HELICOBACTER PYLORI.    3.  DISTAL ESOPHAGUS, MUCOSAL BIOPSY:  SQUAMOUS AND COLUMNAR LINED MUCOSA WITH MILD CHRONIC INFLAMMATION.      Gross Description       In 3 containers, showing mucosal biopsies which measure up to 0.3 cm in greatest dimension.  All embedded and labeled accordingly.  1A duodenum; 2A antrum; 3A distal esophagus.      Embedded Images     Results for orders placed or performed in visit on 08/14/17   CBC Auto Differential   Result Value Ref Range    WBC 4.95 3.20 - 9.80 10*3/mm3    RBC 4.18 3.77 - 5.16 10*6/mm3    Hemoglobin 12.6 12.0 - 15.5 g/dL    Hematocrit 38.5 35.0 - 45.0 %    MCV 92.1 80.0 - 98.0 fL    MCH 30.1 26.5 - 34.0 pg    MCHC 32.7 31.4 - 36.0 g/dL    RDW 13.3 11.5 - 14.5 %    RDW-SD 44.4 36.4 - 46.3 fl    MPV 11.6 8.0 - 12.0 fL    Platelets 244 150 - 450 10*3/mm3     Neutrophil % 39.4 37.0 - 80.0 %    Lymphocyte % 48.9 10.0 - 50.0 %    Monocyte % 8.7 0.0 - 12.0 %    Eosinophil % 2.6 0.0 - 7.0 %    Basophil % 0.2 0.0 - 2.0 %    Immature Grans % 0.2 0.0 - 0.5 %    Neutrophils, Absolute 1.95 (L) 2.00 - 8.60 10*3/mm3    Lymphocytes, Absolute 2.42 0.60 - 4.20 10*3/mm3    Monocytes, Absolute 0.43 0.00 - 0.90 10*3/mm3    Eosinophils, Absolute 0.13 0.00 - 0.70 10*3/mm3    Basophils, Absolute 0.01 0.00 - 0.20 10*3/mm3    Immature Grans, Absolute 0.01 0.00 - 0.02 10*3/mm3   Comprehensive metabolic panel   Result Value Ref Range    Glucose 88 60 - 100 mg/dL    BUN 13 7 - 21 mg/dL    Creatinine 0.72 0.50 - 1.00 mg/dL    Sodium 137 137 - 145 mmol/L    Potassium 3.7 3.5 - 5.1 mmol/L    Chloride 103 95 - 110 mmol/L    CO2 25.0 22.0 - 31.0 mmol/L    Calcium 9.4 8.4 - 10.2 mg/dL    Total Protein 8.1 6.3 - 8.6 g/dL    Albumin 4.20 3.40 - 4.80 g/dL    ALT (SGPT) 35 9 - 52 U/L    AST (SGOT) 29 14 - 36 U/L    Alkaline Phosphatase 107 38 - 126 U/L    Total Bilirubin 0.5 0.2 - 1.3 mg/dL    eGFR  African Amer 100 45 - 104 mL/min/1.73    Globulin 3.9 (H) 2.3 - 3.5 gm/dL    A/G Ratio 1.1 1.1 - 1.8 g/dL    BUN/Creatinine Ratio 18.1 7.0 - 25.0    Anion Gap 9.0 5.0 - 15.0 mmol/L   Results for orders placed or performed in visit on 07/11/17   TSH   Result Value Ref Range    TSH 0.090 (L) 0.460 - 4.680 mIU/mL   Results for orders placed or performed during the hospital encounter of 03/06/17   Tissue Exam   Result Value Ref Range    Case Report       Surgical Pathology Report                         Case: [id number]                                  Authorizing Provider:  Edis Schaffer MD         Collected:           03/06/2017 05:23 PM          Ordering Location:     [facility]             Received:            03/07/2017 11:21 AM                                 [place]                                                     Pathologist:           Gilmar Acharya MD                              "                               Specimen:    Large Intestine, colonic mucosa                                                            Final Diagnosis       COLONIC MUCOSA, RANDOM BIOPSY:      NO SIGNIFICANT HISTOLOGIC ABNORMALITY.      Gross Description       The specimen is labeled \"CM\" and consists of a tan fragment measuring 0.7 x 0.4 x 0.2 cm.  Totally submitted.      Embedded Images     Results for orders placed or performed during the hospital encounter of 01/25/17   Fine Needle Aspiration   Result Value Ref Range    Case Report       Medical Cytology Report                           Case: [id number]                                 Authorizing Provider:  Harry Person,   Collected:           01/25/2017 02:24 PM                                 MD                                                                           Ordering Location:     [facility]             Received:            01/25/2017 02:46 PM                                 [facility]                                                              Pathologist:           Basil Youngblood MD                                                         Specimen:    Thyroid, left                                                                              Final Diagnosis       [facility] Diagnosit[facility] Category: NON-DIAGNOSTIC               Comment       Follicular cells not identified. Abundant lymphocytes and colloid present.       Specimen Adequacy Unsatisfactory for evaluation. See comment.     Embedded Images     Results for orders placed or performed in visit on 01/11/17   CBC Auto Differential   Result Value Ref Range    WBC 4.86 3.20 - 9.80 10*3/mm3    RBC 4.18 3.77 - 5.16 10*6/mm3    Hemoglobin 12.7 12.0 - 15.5 g/dL    Hematocrit 38.4 35.0 - 45.0 %    MCV 91.9 80.0 - 98.0 fL    MCH 30.4 26.5 - 34.0 pg    MCHC 33.1 31.4 - 36.0 g/dL    RDW 13.6 11.5 - 14.5 %    RDW-SD 45.6 36.4 - 46.3 fl    MPV 11.6 8.0 - 12.0 fL    Platelets 254 150 - " 450 10*3/mm3    Neutrophil % 39.9 37.0 - 80.0 %    Lymphocyte % 49.6 10.0 - 50.0 %    Monocyte % 7.6 0.0 - 12.0 %    Eosinophil % 2.3 0.0 - 7.0 %    Basophil % 0.4 0.0 - 2.0 %    Immature Grans % 0.2 0.0 - 0.5 %    Neutrophils, Absolute 1.94 (L) 2.00 - 8.60 10*3/mm3    Lymphocytes, Absolute 2.41 0.60 - 4.20 10*3/mm3    Monocytes, Absolute 0.37 0.00 - 0.90 10*3/mm3    Eosinophils, Absolute 0.11 0.00 - 0.70 10*3/mm3    Basophils, Absolute 0.02 0.00 - 0.20 10*3/mm3    Immature Grans, Absolute 0.01 0.00 - 0.02 10*3/mm3   Vitamin D 25 Hydroxy   Result Value Ref Range    25 Hydroxy, Vitamin D 36.5 30.0 - 100.0 ng/ml   TSH   Result Value Ref Range    TSH 1.720 0.460 - 4.680 mIU/mL   Lipid Panel   Result Value Ref Range    Total Cholesterol 278 (H) 0 - 199 mg/dL    Triglycerides 73 20 - 199 mg/dL    HDL Cholesterol 62 60 - 200 mg/dL    LDL Cholesterol  153 (H) 1 - 129 mg/dL    LDL/HDL Ratio 3.25 (H) 0.00 - 3.22   Comprehensive Metabolic Panel   Result Value Ref Range    Glucose 92 60 - 100 mg/dL    BUN 12 7 - 21 mg/dL    Creatinine 0.75 0.50 - 1.00 mg/dL    Sodium 142 137 - 145 mmol/L    Potassium 3.8 3.5 - 5.1 mmol/L    Chloride 104 95 - 110 mmol/L    CO2 25.0 22.0 - 31.0 mmol/L    Calcium 9.3 8.4 - 10.2 mg/dL    Total Protein 8.5 6.3 - 8.6 g/dL    Albumin 4.30 3.40 - 4.80 g/dL    ALT (SGPT) 39 9 - 52 U/L    AST (SGOT) 32 14 - 36 U/L    Alkaline Phosphatase 112 38 - 126 U/L    Total Bilirubin 0.6 0.2 - 1.3 mg/dL    eGFR   Amer 95 45 - 104 mL/min/1.73    Globulin 4.2 (H) 2.3 - 3.5 gm/dL    A/G Ratio 1.0 (L) 1.1 - 1.8 g/dL    BUN/Creatinine Ratio 16.0 7.0 - 25.0    Anion Gap 13.0 5.0 - 15.0 mmol/L     *Note: Due to a large number of results and/or encounters for the requested time period, some results have not been displayed. A complete set of results can be found in Results Review.

## 2017-11-07 ENCOUNTER — APPOINTMENT (OUTPATIENT)
Dept: LAB | Facility: HOSPITAL | Age: 62
End: 2017-11-07

## 2017-11-07 ENCOUNTER — OFFICE VISIT (OUTPATIENT)
Dept: GASTROENTEROLOGY | Facility: CLINIC | Age: 62
End: 2017-11-07

## 2017-11-07 VITALS
SYSTOLIC BLOOD PRESSURE: 132 MMHG | DIASTOLIC BLOOD PRESSURE: 76 MMHG | WEIGHT: 269 LBS | HEIGHT: 65 IN | HEART RATE: 67 BPM | BODY MASS INDEX: 44.82 KG/M2

## 2017-11-07 DIAGNOSIS — R10.84 GENERALIZED ABDOMINAL PAIN: Primary | ICD-10-CM

## 2017-11-07 DIAGNOSIS — K59.00 CONSTIPATION, UNSPECIFIED CONSTIPATION TYPE: ICD-10-CM

## 2017-11-07 DIAGNOSIS — R43.2 ALTERED TASTE: ICD-10-CM

## 2017-11-07 LAB
ALBUMIN SERPL-MCNC: 4.2 G/DL (ref 3.4–4.8)
ALBUMIN/GLOB SERPL: 1 G/DL (ref 1.1–1.8)
ALP SERPL-CCNC: 94 U/L (ref 38–126)
ALT SERPL W P-5'-P-CCNC: 35 U/L (ref 9–52)
AMYLASE SERPL-CCNC: 91 U/L (ref 50–130)
ANION GAP SERPL CALCULATED.3IONS-SCNC: 13 MMOL/L (ref 5–15)
AST SERPL-CCNC: 29 U/L (ref 14–36)
BILIRUB SERPL-MCNC: 0.4 MG/DL (ref 0.2–1.3)
BUN BLD-MCNC: 10 MG/DL (ref 7–21)
BUN/CREAT SERPL: 13.9 (ref 7–25)
CALCIUM SPEC-SCNC: 9.6 MG/DL (ref 8.4–10.2)
CHLORIDE SERPL-SCNC: 104 MMOL/L (ref 95–110)
CO2 SERPL-SCNC: 26 MMOL/L (ref 22–31)
CREAT BLD-MCNC: 0.72 MG/DL (ref 0.5–1)
GFR SERPL CREATININE-BSD FRML MDRD: 100 ML/MIN/1.73 (ref 45–104)
GLOBULIN UR ELPH-MCNC: 4.1 GM/DL (ref 2.3–3.5)
GLUCOSE BLD-MCNC: 81 MG/DL (ref 60–100)
LIPASE SERPL-CCNC: 74 U/L (ref 23–300)
POTASSIUM BLD-SCNC: 4 MMOL/L (ref 3.5–5.1)
PROT SERPL-MCNC: 8.3 G/DL (ref 6.3–8.6)
SODIUM BLD-SCNC: 143 MMOL/L (ref 137–145)

## 2017-11-07 PROCEDURE — 99213 OFFICE O/P EST LOW 20 MIN: CPT | Performed by: NURSE PRACTITIONER

## 2017-11-07 PROCEDURE — 36415 COLL VENOUS BLD VENIPUNCTURE: CPT | Performed by: NURSE PRACTITIONER

## 2017-11-07 PROCEDURE — 80053 COMPREHEN METABOLIC PANEL: CPT | Performed by: NURSE PRACTITIONER

## 2017-11-07 PROCEDURE — 82150 ASSAY OF AMYLASE: CPT | Performed by: NURSE PRACTITIONER

## 2017-11-07 PROCEDURE — 83690 ASSAY OF LIPASE: CPT | Performed by: NURSE PRACTITIONER

## 2017-11-07 NOTE — PROGRESS NOTES
"Chief Complaint   Patient presents with   • Constipation   • Heartburn     GERD       Subjective    Rosa M Ferguson is a 62 y.o. female. she is here today for follow-up.    Constipation   This is a chronic problem. The patient is on a high fiber diet. She exercises regularly. There has been adequate water intake. Associated symptoms include bloating. Pertinent negatives include no abdominal pain, diarrhea, fever, hematochezia, melena, nausea, rectal pain or vomiting.   Heartburn   She reports no abdominal pain, no nausea or no sore throat. Pertinent negatives include no fatigue or melena.   Patient reports she tried Linzess for a few days however cause explosive diarrhea and she was unable to continue it.  She has increased her water and fruit juice intake and states bowel habits are more frequent.  States her stool was always very soft however he does take several days in between bowel movements and she will have to attempt to have vomiting for about 30 minutes before able to.  Currently denies any abdominal pain. Reports altered taste sensation, continual bloating \"hard area in epigastric region\"  States she is still having frequent reflux symptoms describes it as a tongue burning sensation particularly worse after she drinks coffee in the morning.  Denies any nausea or vomiting.  She denies any dysphagia.  Her EGD was completed 10/9/17 and noted esophagitis, gastritis and normal duodenum.  On biopsy chronic gastritis, chronic esophagitis and duodenitis was noted.  No dysplasia or goblet cell metaplasia was noted.Reports she has been on Protonix about 2 weeks with no significant change in symptoms.  She is ill having upper abdominal pain and is concerned because her brother was diagnosed with pancreatic cancer and was to like to treat.  She has not had any weight loss and reports a good appetite is just the food types very bitter.  States her bowel habits are controlled with thick fruit juices daily has not " needed Linzess.  Plan; we'll schedule patient for CT of abdomen and pelvis due to persistent abdominal pain and continue patient on Protonix 40 mg per day.  We'll check CMP, amylase and lipase.  Follow-up after test return to office sooner if needed.     The following portions of the patient's history were reviewed and updated as appropriate:   Past Medical History:   Diagnosis Date   • Acquired hypothyroidism    • Astigmatism    • Contact dermatitis due to plants    • Dyslipidemia    • Elevated blood pressure reading without diagnosis of hypertension    • Generalized anxiety disorder    • Goiter    • Hashimoto's thyroiditis    • History of varicose veins    • Hypothyroidism    • Multinodular goiter    • Myopia    • Obesity    • Pain in eye     etiol unknown      • Plantar fasciitis    • Rash    • Thyroid nodule    • Upper respiratory infection    • Urinary tract infectious disease    • Vitamin D deficiency      Past Surgical History:   Procedure Laterality Date   • BLADDER SUSPENSION  2011   • COLONOSCOPY N/A 3/6/2017    Procedure: COLONOSCOPY;  Surgeon: Edis Schaffer MD;  Location: [facility] ENDOSCOPY;  Service:    • CYSTOSCOPY  09/23/1999    Cystoscopy and left retrograde study. Stone passed with uretherocele.   • ENDOSCOPY N/A 10/9/2017    Procedure: ESOPHAGOGASTRODUODENOSCOPY possible dilation ;  Surgeon: Edis Schaffer MD;  Location: [facility] ENDOSCOPY;  Service:    • INJECTION OF MEDICATION  06/14/2016    Kenalog (1)     • TUBAL ABDOMINAL LIGATION  01/25/1992    Bilateral partial salpingectomy. Desires sterilization.     Family History   Problem Relation Age of Onset   • Breast cancer Other    • Cancer Other    • Coronary artery disease Other    • Diabetes Other    • Cancer Mother    • Heart disease Father    • Stroke Paternal Uncle      OB History     No data available        Current Outpatient Prescriptions   Medication Sig Dispense Refill   • atorvastatin (LIPITOR) 20 MG tablet Take 20 mg by mouth Every  "Night.     • levothyroxine (SYNTHROID) 100 MCG tablet Take 1 tablet by mouth Daily. 30 tablet 5   • pantoprazole (PROTONIX) 40 MG EC tablet Take 1 tablet by mouth Daily. 30 tablet 5   • vitamin D (ERGOCALCIFEROL) 82083 units capsule capsule Take 1 capsule by mouth Every 7 (Seven) Days. 12 capsule 1     No current facility-administered medications for this visit.      No Known Allergies  Social History     Social History   • Marital status: Single     Spouse name: N/A   • Number of children: N/A   • Years of education: N/A     Social History Main Topics   • Smoking status: Never Smoker   • Smokeless tobacco: Never Used   • Alcohol use Yes      Comment: socially   • Drug use: No   • Sexual activity: Defer     Other Topics Concern   • None     Social History Narrative       Review of Systems  Review of Systems   Constitutional: Negative for activity change, appetite change, chills, diaphoresis, fatigue, fever and unexpected weight change.   HENT: Negative for sore throat and trouble swallowing (reflux symptoms no dysphagia ).    Respiratory: Negative for shortness of breath.    Gastrointestinal: Positive for bloating and constipation (controlled with fruit juices daily ). Negative for abdominal distention, abdominal pain, anal bleeding, blood in stool, diarrhea, hematochezia, melena, nausea, rectal pain and vomiting.   Musculoskeletal: Negative for arthralgias.   Skin: Negative for pallor.   Neurological: Negative for light-headedness.        /76 (BP Location: Left arm, Patient Position: Sitting, Cuff Size: Adult)  Pulse 67  Ht 65\" (165.1 cm)  Wt 269 lb (122 kg)  BMI 44.76 kg/m2    Objective    Physical Exam   Constitutional: She is oriented to person, place, and time. She appears well-developed and well-nourished. She is cooperative. No distress.   HENT:   Head: Normocephalic and atraumatic.   Neck: Normal range of motion. Neck supple. No thyromegaly present.   Cardiovascular: Normal rate, regular rhythm and " normal heart sounds.    Pulmonary/Chest: Effort normal and breath sounds normal. She has no wheezes. She has no rhonchi. She has no rales.   Abdominal: Soft. Normal appearance and bowel sounds are normal. She exhibits no shifting dullness and no distension. There is no hepatosplenomegaly. There is tenderness in the right upper quadrant, epigastric area and left upper quadrant. There is no rigidity and no guarding. No hernia.   Lymphadenopathy:     She has no cervical adenopathy.   Neurological: She is alert and oriented to person, place, and time.   Skin: Skin is warm, dry and intact. No rash noted. No pallor.   Psychiatric: She has a normal mood and affect. Her speech is normal.     Admission on 10/09/2017, Discharged on 10/09/2017   Component Date Value Ref Range Status   • Case Report 10/09/2017    Final                    Value:Surgical Pathology Report                         Case: [id number]                                  Authorizing Provider:  Edis Schaffer MD         Collected:           10/09/2017 03:23 PM          Ordering Location:     [facility]             Received:            10/10/2017 08:16 AM                                 [place] [facility]                                                     Pathologist:           Basil Youngblood MD                                                         Specimens:   1) - Small Intestine, Duodenum, small bowel                                                         2) - Gastric, Antrum                                                                                3) - Esophagus, Distal                                                                    • Final Diagnosis 10/09/2017    Final                    Value:This result contains rich text formatting which cannot be displayed here.   • Gross Description 10/09/2017    Final                    Value:This result contains rich text formatting which cannot be displayed here.     Assessment/Plan       1. Generalized abdominal pain    2. Altered taste    3. Constipation, unspecified constipation type    .     Review and/or summary of lab tests, radiology, procedures, medications. Review and summary of old records and obtaining of history. The risks and benefits of my recommendations, as well as other treatment options were discussed with the patient today. Questions were answered.    No orders of the defined types were placed in this encounter.      Follow-up: Return in about 4 weeks (around 12/5/2017).          This document has been electronically signed by RACHEL Lowry on November 7, 2017 10:52 AM             Results for orders placed or performed during the hospital encounter of 10/09/17   Tissue Pathology Exam - Tissue, Small Intestine, Duodenum   Result Value Ref Range    Case Report       Surgical Pathology Report                         Case: [id number]                                  Authorizing Provider:  Edis Schaffer MD         Collected:           10/09/2017 03:23 PM          Ordering Location:     [facility]             Received:            10/10/2017 08:16 AM                                 [place] [facility]                                                     Pathologist:           Basil Youngblood MD                                                         Specimens:   1) - Small Intestine, Duodenum, small bowel                                                         2) - Gastric, Antrum                                                                                3) - Esophagus, Distal                                                                     Final Diagnosis       1.  DUODENUM, MUCOSAL BIOPSY:  MILD CHRONIC INFLAMMATION.    2.  GASTRIC ANTRUM, MUCOSAL BIOPSY:  MILD CHRONIC GASTRITIS.  NO EVIDENCE OF HELICOBACTER PYLORI.    3.  DISTAL ESOPHAGUS, MUCOSAL BIOPSY:  SQUAMOUS AND COLUMNAR LINED MUCOSA WITH MILD CHRONIC INFLAMMATION.      Gross Description       In 3  containers, showing mucosal biopsies which measure up to 0.3 cm in greatest dimension.  All embedded and labeled accordingly.  1A duodenum; 2A antrum; 3A distal esophagus.      Embedded Images     Results for orders placed or performed in visit on 08/14/17   CBC Auto Differential   Result Value Ref Range    WBC 4.95 3.20 - 9.80 10*3/mm3    RBC 4.18 3.77 - 5.16 10*6/mm3    Hemoglobin 12.6 12.0 - 15.5 g/dL    Hematocrit 38.5 35.0 - 45.0 %    MCV 92.1 80.0 - 98.0 fL    MCH 30.1 26.5 - 34.0 pg    MCHC 32.7 31.4 - 36.0 g/dL    RDW 13.3 11.5 - 14.5 %    RDW-SD 44.4 36.4 - 46.3 fl    MPV 11.6 8.0 - 12.0 fL    Platelets 244 150 - 450 10*3/mm3    Neutrophil % 39.4 37.0 - 80.0 %    Lymphocyte % 48.9 10.0 - 50.0 %    Monocyte % 8.7 0.0 - 12.0 %    Eosinophil % 2.6 0.0 - 7.0 %    Basophil % 0.2 0.0 - 2.0 %    Immature Grans % 0.2 0.0 - 0.5 %    Neutrophils, Absolute 1.95 (L) 2.00 - 8.60 10*3/mm3    Lymphocytes, Absolute 2.42 0.60 - 4.20 10*3/mm3    Monocytes, Absolute 0.43 0.00 - 0.90 10*3/mm3    Eosinophils, Absolute 0.13 0.00 - 0.70 10*3/mm3    Basophils, Absolute 0.01 0.00 - 0.20 10*3/mm3    Immature Grans, Absolute 0.01 0.00 - 0.02 10*3/mm3   Comprehensive metabolic panel   Result Value Ref Range    Glucose 88 60 - 100 mg/dL    BUN 13 7 - 21 mg/dL    Creatinine 0.72 0.50 - 1.00 mg/dL    Sodium 137 137 - 145 mmol/L    Potassium 3.7 3.5 - 5.1 mmol/L    Chloride 103 95 - 110 mmol/L    CO2 25.0 22.0 - 31.0 mmol/L    Calcium 9.4 8.4 - 10.2 mg/dL    Total Protein 8.1 6.3 - 8.6 g/dL    Albumin 4.20 3.40 - 4.80 g/dL    ALT (SGPT) 35 9 - 52 U/L    AST (SGOT) 29 14 - 36 U/L    Alkaline Phosphatase 107 38 - 126 U/L    Total Bilirubin 0.5 0.2 - 1.3 mg/dL    eGFR  African Amer 100 45 - 104 mL/min/1.73    Globulin 3.9 (H) 2.3 - 3.5 gm/dL    A/G Ratio 1.1 1.1 - 1.8 g/dL    BUN/Creatinine Ratio 18.1 7.0 - 25.0    Anion Gap 9.0 5.0 - 15.0 mmol/L   Results for orders placed or performed in visit on 07/11/17   TSH   Result Value Ref Range     "TSH 0.090 (L) 0.460 - 4.680 mIU/mL   Results for orders placed or performed during the hospital encounter of 03/06/17   Tissue Exam   Result Value Ref Range    Case Report       Surgical Pathology Report                         Case: [id number]                                  Authorizing Provider:  Edis Schaffer MD         Collected:           03/06/2017 05:23 PM          Ordering Location:     [facility]             Received:            03/07/2017 11:21 AM                                 [place] [facility]                                                     Pathologist:           Gilmar Acharya MD                                                            Specimen:    Large Intestine, colonic mucosa                                                            Final Diagnosis       COLONIC MUCOSA, RANDOM BIOPSY:      NO SIGNIFICANT HISTOLOGIC ABNORMALITY.      Gross Description       The specimen is labeled \"CM\" and consists of a tan fragment measuring 0.7 x 0.4 x 0.2 cm.  Totally submitted.      Embedded Images     Results for orders placed or performed during the hospital encounter of 01/25/17   Fine Needle Aspiration   Result Value Ref Range    Case Report       Medical Cytology Report                           Case: [id number]                                 Authorizing Provider:  Harry Person,   Collected:           01/25/2017 02:24 PM                                 MD                                                                           Ordering Location:     [facility]             Received:            01/25/2017 02:46 PM                                 [facility]                                                              Pathologist:           Basil Youngblood MD                                                         Specimen:    Thyroid, left                                                                              Final Diagnosis       [facility] " Category: NON-DIAGNOSTIC               Comment       Follicular cells not identified. Abundant lymphocytes and colloid present.       Specimen Adequacy Unsatisfactory for evaluation. See comment.     Embedded Images     Results for orders placed or performed in visit on 01/11/17   CBC Auto Differential   Result Value Ref Range    WBC 4.86 3.20 - 9.80 10*3/mm3    RBC 4.18 3.77 - 5.16 10*6/mm3    Hemoglobin 12.7 12.0 - 15.5 g/dL    Hematocrit 38.4 35.0 - 45.0 %    MCV 91.9 80.0 - 98.0 fL    MCH 30.4 26.5 - 34.0 pg    MCHC 33.1 31.4 - 36.0 g/dL    RDW 13.6 11.5 - 14.5 %    RDW-SD 45.6 36.4 - 46.3 fl    MPV 11.6 8.0 - 12.0 fL    Platelets 254 150 - 450 10*3/mm3    Neutrophil % 39.9 37.0 - 80.0 %    Lymphocyte % 49.6 10.0 - 50.0 %    Monocyte % 7.6 0.0 - 12.0 %    Eosinophil % 2.3 0.0 - 7.0 %    Basophil % 0.4 0.0 - 2.0 %    Immature Grans % 0.2 0.0 - 0.5 %    Neutrophils, Absolute 1.94 (L) 2.00 - 8.60 10*3/mm3    Lymphocytes, Absolute 2.41 0.60 - 4.20 10*3/mm3    Monocytes, Absolute 0.37 0.00 - 0.90 10*3/mm3    Eosinophils, Absolute 0.11 0.00 - 0.70 10*3/mm3    Basophils, Absolute 0.02 0.00 - 0.20 10*3/mm3    Immature Grans, Absolute 0.01 0.00 - 0.02 10*3/mm3   Vitamin D 25 Hydroxy   Result Value Ref Range    25 Hydroxy, Vitamin D 36.5 30.0 - 100.0 ng/ml   TSH   Result Value Ref Range    TSH 1.720 0.460 - 4.680 mIU/mL   Lipid Panel   Result Value Ref Range    Total Cholesterol 278 (H) 0 - 199 mg/dL    Triglycerides 73 20 - 199 mg/dL    HDL Cholesterol 62 60 - 200 mg/dL    LDL Cholesterol  153 (H) 1 - 129 mg/dL    LDL/HDL Ratio 3.25 (H) 0.00 - 3.22   Comprehensive Metabolic Panel   Result Value Ref Range    Glucose 92 60 - 100 mg/dL    BUN 12 7 - 21 mg/dL    Creatinine 0.75 0.50 - 1.00 mg/dL    Sodium 142 137 - 145 mmol/L    Potassium 3.8 3.5 - 5.1 mmol/L    Chloride 104 95 - 110 mmol/L    CO2 25.0 22.0 - 31.0 mmol/L    Calcium 9.3 8.4 - 10.2 mg/dL    Total Protein 8.5 6.3 - 8.6 g/dL    Albumin 4.30 3.40 - 4.80 g/dL     ALT (SGPT) 39 9 - 52 U/L    AST (SGOT) 32 14 - 36 U/L    Alkaline Phosphatase 112 38 - 126 U/L    Total Bilirubin 0.6 0.2 - 1.3 mg/dL    eGFR   Amer 95 45 - 104 mL/min/1.73    Globulin 4.2 (H) 2.3 - 3.5 gm/dL    A/G Ratio 1.0 (L) 1.1 - 1.8 g/dL    BUN/Creatinine Ratio 16.0 7.0 - 25.0    Anion Gap 13.0 5.0 - 15.0 mmol/L     *Note: Due to a large number of results and/or encounters for the requested time period, some results have not been displayed. A complete set of results can be found in Results Review.

## 2017-11-13 ENCOUNTER — HOSPITAL ENCOUNTER (OUTPATIENT)
Dept: CT IMAGING | Facility: HOSPITAL | Age: 62
Discharge: HOME | End: 2017-11-13
Admitting: NURSE PRACTITIONER

## 2017-11-13 PROCEDURE — 74177 CT ABD & PELVIS W/CONTRAST: CPT

## 2017-11-13 PROCEDURE — 0 IOPAMIDOL 61 % SOLUTION: Performed by: NURSE PRACTITIONER

## 2017-11-13 RX ADMIN — IOPAMIDOL 95 ML: 612 INJECTION, SOLUTION INTRAVENOUS at 13:00

## 2017-11-14 ENCOUNTER — TELEPHONE (OUTPATIENT)
Dept: GASTROENTEROLOGY | Facility: CLINIC | Age: 62
End: 2017-11-14

## 2017-11-14 NOTE — TELEPHONE ENCOUNTER
Spoke with patient per manisha RAMOS in regards to her results from her CT. I notified patient of who I was and where I was calling from. I then notified patient that I was calling in regards to her CT results and that everything looked normal. Patient verbalized her understanding and had no further questions at the moment.

## 2017-11-14 NOTE — TELEPHONE ENCOUNTER
----- Message from RACHEL Brown sent at 11/13/2017  3:35 PM CST -----  Please call the patient regarding her normal result.

## 2017-12-05 ENCOUNTER — OFFICE VISIT (OUTPATIENT)
Dept: GASTROENTEROLOGY | Facility: CLINIC | Age: 62
End: 2017-12-05

## 2017-12-05 VITALS
SYSTOLIC BLOOD PRESSURE: 134 MMHG | DIASTOLIC BLOOD PRESSURE: 81 MMHG | HEART RATE: 74 BPM | WEIGHT: 269 LBS | HEIGHT: 65 IN | BODY MASS INDEX: 44.82 KG/M2

## 2017-12-05 DIAGNOSIS — K21.9 GASTROESOPHAGEAL REFLUX DISEASE WITHOUT ESOPHAGITIS: ICD-10-CM

## 2017-12-05 DIAGNOSIS — R14.0 BLOATING: ICD-10-CM

## 2017-12-05 DIAGNOSIS — K59.00 CONSTIPATION, UNSPECIFIED CONSTIPATION TYPE: Primary | ICD-10-CM

## 2017-12-05 PROCEDURE — 99213 OFFICE O/P EST LOW 20 MIN: CPT | Performed by: NURSE PRACTITIONER

## 2017-12-05 RX ORDER — PANTOPRAZOLE SODIUM 20 MG/1
20 TABLET, DELAYED RELEASE ORAL DAILY
Qty: 30 TABLET | Refills: 5 | Status: SHIPPED | OUTPATIENT
Start: 2017-12-05 | End: 2018-01-15 | Stop reason: DRUGHIGH

## 2017-12-05 NOTE — PROGRESS NOTES
"Chief Complaint   Patient presents with   • Abdominal Pain     f/u after labs and ct       Subjective    Rosa M Ferguson is a 62 y.o. female. she is here today for follow-up.    Abdominal Pain   Associated symptoms include constipation (controlled with fruit juices daily ). Pertinent negatives include no arthralgias, diarrhea, fever, hematochezia, melena, nausea or vomiting. Her past medical history is significant for GERD.   Constipation   This is a chronic problem. The patient is on a high fiber diet. She exercises regularly. There has been adequate water intake. Associated symptoms include bloating. Pertinent negatives include no abdominal pain, diarrhea, fever, hematochezia, melena, nausea, rectal pain or vomiting.   Heartburn   She reports no abdominal pain, no nausea or no sore throat. Pertinent negatives include no fatigue or melena.   Patient reports she tried Linzess for a few days however cause explosive diarrhea and she was unable to continue it.  She has increased her water and fruit juice intake and states bowel habits are more frequent.  States her stool is always very soft and Has been more regular with fruit juices daily.  if it has been a few days she will use lactulose.  Currently denies any abdominal pain. Reports altered taste sensation, continual bloating \"hard area in epigastric region\"  States  reflux symptoms of tongue burning sensation and bitter taste  Have improved with PPI daily.  Denies any nausea or vomiting.  She denies any dysphagia.    EGD was completed 10/9/17 and noted esophagitis, gastritis and normal duodenum.  On biopsy chronic gastritis, chronic esophagitis and duodenitis was noted.  No dysplasia or goblet cell metaplasia was noted.  CT noted no acute abnormality.  Plan; patient would like to decrease Protonix and discontinue if possible.  Protonix 20 mg sent in to pharmacy.  Follow-up in 6 months return to office sooner if needed.     The following portions of the patient's " history were reviewed and updated as appropriate:   Past Medical History:   Diagnosis Date   • Acquired hypothyroidism    • Astigmatism    • Contact dermatitis due to plants    • Dyslipidemia    • Elevated blood pressure reading without diagnosis of hypertension    • Generalized anxiety disorder    • Goiter    • Hashimoto's thyroiditis    • History of varicose veins    • Hypothyroidism    • Multinodular goiter    • Myopia    • Obesity    • Pain in eye     etiol unknown      • Plantar fasciitis    • Rash    • Thyroid nodule    • Upper respiratory infection    • Urinary tract infectious disease    • Vitamin D deficiency      Past Surgical History:   Procedure Laterality Date   • BLADDER SUSPENSION  2011   • COLONOSCOPY N/A 3/6/2017    Procedure: COLONOSCOPY;  Surgeon: Edis Schaffer MD;  Location: [facility] ENDOSCOPY;  Service:    • CYSTOSCOPY  09/23/1999    Cystoscopy and left retrograde study. Stone passed with uretherocele.   • ENDOSCOPY N/A 10/9/2017    Procedure: ESOPHAGOGASTRODUODENOSCOPY possible dilation ;  Surgeon: Edis Schaffer MD;  Location: [facility] ENDOSCOPY;  Service:    • INJECTION OF MEDICATION  06/14/2016    Kenalog (1)     • TUBAL ABDOMINAL LIGATION  01/25/1992    Bilateral partial salpingectomy. Desires sterilization.     Family History   Problem Relation Age of Onset   • Breast cancer Other    • Cancer Other    • Coronary artery disease Other    • Diabetes Other    • Cancer Mother    • Heart disease Father    • Stroke Paternal Uncle      OB History     No data available        Current Outpatient Prescriptions   Medication Sig Dispense Refill   • atorvastatin (LIPITOR) 20 MG tablet Take 20 mg by mouth Every Night.     • levothyroxine (SYNTHROID) 100 MCG tablet Take 1 tablet by mouth Daily. 30 tablet 5   • vitamin D (ERGOCALCIFEROL) 88696 units capsule capsule Take 1 capsule by mouth Every 7 (Seven) Days. 12 capsule 1   • pantoprazole (PROTONIX) 20 MG EC tablet Take 1 tablet (20 mg total) by mouth  "Daily 30 tablet 5     No current facility-administered medications for this visit.      No Known Allergies  Social History     Social History   • Marital status: Single     Spouse name: N/A   • Number of children: N/A   • Years of education: N/A     Social History Main Topics   • Smoking status: Never Smoker   • Smokeless tobacco: Never Used   • Alcohol use Yes      Comment: socially   • Drug use: No   • Sexual activity: Defer     Other Topics Concern   • None     Social History Narrative       Review of Systems  Review of Systems   Constitutional: Negative for activity change, appetite change, chills, diaphoresis, fatigue, fever and unexpected weight change.   HENT: Negative for sore throat and trouble swallowing (reflux symptoms improved with protonix).    Respiratory: Negative for shortness of breath.    Gastrointestinal: Positive for bloating and constipation (controlled with fruit juices daily ). Negative for abdominal distention, abdominal pain, anal bleeding, blood in stool, diarrhea, hematochezia, melena, nausea, rectal pain and vomiting.   Musculoskeletal: Negative for arthralgias.   Skin: Negative for pallor.   Neurological: Negative for light-headedness.        /81 (BP Location: Left arm, Patient Position: Sitting, Cuff Size: Adult)  Pulse 74  Ht 165.1 cm (65\")  Wt 122 kg (269 lb)  BMI 44.76 kg/m2    Objective    Physical Exam   Constitutional: She is oriented to person, place, and time. She appears well-developed and well-nourished. She is cooperative. No distress.   HENT:   Head: Normocephalic and atraumatic.   Neck: Normal range of motion. Neck supple. No thyromegaly present.   Cardiovascular: Normal rate, regular rhythm and normal heart sounds.    Pulmonary/Chest: Effort normal and breath sounds normal. She has no wheezes. She has no rhonchi. She has no rales.   Abdominal: Soft. Normal appearance and bowel sounds are normal. She exhibits no shifting dullness and no distension. There is no " hepatosplenomegaly. There is no tenderness. There is no rigidity and no guarding. No hernia.   Lymphadenopathy:     She has no cervical adenopathy.   Neurological: She is alert and oriented to person, place, and time.   Skin: Skin is warm, dry and intact. No rash noted. No pallor.   Psychiatric: She has a normal mood and affect. Her speech is normal.     Office Visit on 11/07/2017   Component Date Value Ref Range Status   • Lipase 11/07/2017 74  23 - 300 U/L Final   • Amylase 11/07/2017 91  50 - 130 U/L Final   • Glucose 11/07/2017 81  60 - 100 mg/dL Final   • BUN 11/07/2017 10  7 - 21 mg/dL Final   • Creatinine 11/07/2017 0.72  0.50 - 1.00 mg/dL Final   • Sodium 11/07/2017 143  137 - 145 mmol/L Final   • Potassium 11/07/2017 4.0  3.5 - 5.1 mmol/L Final   • Chloride 11/07/2017 104  95 - 110 mmol/L Final   • CO2 11/07/2017 26.0  22.0 - 31.0 mmol/L Final   • Calcium 11/07/2017 9.6  8.4 - 10.2 mg/dL Final   • Total Protein 11/07/2017 8.3  6.3 - 8.6 g/dL Final   • Albumin 11/07/2017 4.20  3.40 - 4.80 g/dL Final   • ALT (SGPT) 11/07/2017 35  9 - 52 U/L Final   • AST (SGOT) 11/07/2017 29  14 - 36 U/L Final   • Alkaline Phosphatase 11/07/2017 94  38 - 126 U/L Final   • Total Bilirubin 11/07/2017 0.4  0.2 - 1.3 mg/dL Final   • eGFR   Amer 11/07/2017 100  45 - 104 mL/min/1.73 Final   • Globulin 11/07/2017 4.1* 2.3 - 3.5 gm/dL Final   • A/G Ratio 11/07/2017 1.0* 1.1 - 1.8 g/dL Final   • BUN/Creatinine Ratio 11/07/2017 13.9  7.0 - 25.0 Final   • Anion Gap 11/07/2017 13.0  5.0 - 15.0 mmol/L Final     Assessment/Plan      1. Constipation, unspecified constipation type    2. Bloating    3. Gastroesophageal reflux disease without esophagitis    .     Review and/or summary of lab tests, radiology, procedures, medications. Review and summary of old records and obtaining of history. The risks and benefits of my recommendations, as well as other treatment options were discussed with the patient today. Questions were  answered.    New Medications Ordered This Visit   Medications   • pantoprazole (PROTONIX) 20 MG EC tablet     Sig: Take 1 tablet (20 mg total) by mouth Daily     Dispense:  30 tablet     Refill:  5       Follow-up: Return in about 6 months (around 6/5/2018).          This document has been electronically signed by RACHEL Lowry on December 5, 2017 1:00 PM             Results for orders placed or performed in visit on 11/07/17   Lipase   Result Value Ref Range    Lipase 74 23 - 300 U/L   Amylase   Result Value Ref Range    Amylase 91 50 - 130 U/L   Comprehensive Metabolic Panel   Result Value Ref Range    Glucose 81 60 - 100 mg/dL    BUN 10 7 - 21 mg/dL    Creatinine 0.72 0.50 - 1.00 mg/dL    Sodium 143 137 - 145 mmol/L    Potassium 4.0 3.5 - 5.1 mmol/L    Chloride 104 95 - 110 mmol/L    CO2 26.0 22.0 - 31.0 mmol/L    Calcium 9.6 8.4 - 10.2 mg/dL    Total Protein 8.3 6.3 - 8.6 g/dL    Albumin 4.20 3.40 - 4.80 g/dL    ALT (SGPT) 35 9 - 52 U/L    AST (SGOT) 29 14 - 36 U/L    Alkaline Phosphatase 94 38 - 126 U/L    Total Bilirubin 0.4 0.2 - 1.3 mg/dL    eGFR  African Amer 100 45 - 104 mL/min/1.73    Globulin 4.1 (H) 2.3 - 3.5 gm/dL    A/G Ratio 1.0 (L) 1.1 - 1.8 g/dL    BUN/Creatinine Ratio 13.9 7.0 - 25.0    Anion Gap 13.0 5.0 - 15.0 mmol/L   Results for orders placed or performed during the hospital encounter of 10/09/17   Tissue Pathology Exam - Tissue, Small Intestine, Duodenum   Result Value Ref Range    Case Report       Surgical Pathology Report                         Case: [id number]                                  Authorizing Provider:  Edis Schaffer MD         Collected:           10/09/2017 03:23 PM          Ordering Location:     [facility]             Received:            10/10/2017 08:16 AM                                 [place] [facility]                                                     Pathologist:           Basil Youngblood MD                                                          Specimens:   1) - Small Intestine, Duodenum, small bowel                                                         2) - Gastric, Antrum                                                                                3) - Esophagus, Distal                                                                     Final Diagnosis       1.  DUODENUM, MUCOSAL BIOPSY:  MILD CHRONIC INFLAMMATION.    2.  GASTRIC ANTRUM, MUCOSAL BIOPSY:  MILD CHRONIC GASTRITIS.  NO EVIDENCE OF HELICOBACTER PYLORI.    3.  DISTAL ESOPHAGUS, MUCOSAL BIOPSY:  SQUAMOUS AND COLUMNAR LINED MUCOSA WITH MILD CHRONIC INFLAMMATION.      Gross Description       In 3 containers, showing mucosal biopsies which measure up to 0.3 cm in greatest dimension.  All embedded and labeled accordingly.  1A duodenum; 2A antrum; 3A distal esophagus.      Embedded Images     Results for orders placed or performed in visit on 08/14/17   CBC Auto Differential   Result Value Ref Range    WBC 4.95 3.20 - 9.80 10*3/mm3    RBC 4.18 3.77 - 5.16 10*6/mm3    Hemoglobin 12.6 12.0 - 15.5 g/dL    Hematocrit 38.5 35.0 - 45.0 %    MCV 92.1 80.0 - 98.0 fL    MCH 30.1 26.5 - 34.0 pg    MCHC 32.7 31.4 - 36.0 g/dL    RDW 13.3 11.5 - 14.5 %    RDW-SD 44.4 36.4 - 46.3 fl    MPV 11.6 8.0 - 12.0 fL    Platelets 244 150 - 450 10*3/mm3    Neutrophil % 39.4 37.0 - 80.0 %    Lymphocyte % 48.9 10.0 - 50.0 %    Monocyte % 8.7 0.0 - 12.0 %    Eosinophil % 2.6 0.0 - 7.0 %    Basophil % 0.2 0.0 - 2.0 %    Immature Grans % 0.2 0.0 - 0.5 %    Neutrophils, Absolute 1.95 (L) 2.00 - 8.60 10*3/mm3    Lymphocytes, Absolute 2.42 0.60 - 4.20 10*3/mm3    Monocytes, Absolute 0.43 0.00 - 0.90 10*3/mm3    Eosinophils, Absolute 0.13 0.00 - 0.70 10*3/mm3    Basophils, Absolute 0.01 0.00 - 0.20 10*3/mm3    Immature Grans, Absolute 0.01 0.00 - 0.02 10*3/mm3   Comprehensive metabolic panel   Result Value Ref Range    Glucose 88 60 - 100 mg/dL    BUN 13 7 - 21 mg/dL    Creatinine 0.72 0.50 - 1.00 mg/dL    Sodium 137 137 -  "145 mmol/L    Potassium 3.7 3.5 - 5.1 mmol/L    Chloride 103 95 - 110 mmol/L    CO2 25.0 22.0 - 31.0 mmol/L    Calcium 9.4 8.4 - 10.2 mg/dL    Total Protein 8.1 6.3 - 8.6 g/dL    Albumin 4.20 3.40 - 4.80 g/dL    ALT (SGPT) 35 9 - 52 U/L    AST (SGOT) 29 14 - 36 U/L    Alkaline Phosphatase 107 38 - 126 U/L    Total Bilirubin 0.5 0.2 - 1.3 mg/dL    eGFR  African Amer 100 45 - 104 mL/min/1.73    Globulin 3.9 (H) 2.3 - 3.5 gm/dL    A/G Ratio 1.1 1.1 - 1.8 g/dL    BUN/Creatinine Ratio 18.1 7.0 - 25.0    Anion Gap 9.0 5.0 - 15.0 mmol/L   Results for orders placed or performed in visit on 07/11/17   TSH   Result Value Ref Range    TSH 0.090 (L) 0.460 - 4.680 mIU/mL   Results for orders placed or performed during the hospital encounter of 03/06/17   Tissue Exam   Result Value Ref Range    Case Report       Surgical Pathology Report                         Case: [id number]                                  Authorizing Provider:  Edis Schaffer MD         Collected:           03/06/2017 05:23 PM          Ordering Location:     [facility]             Received:            03/07/2017 11:21 AM                                 [facility] [facility]                                                     Pathologist:           Gilmar Acharya MD                                                            Specimen:    Large Intestine, colonic mucosa                                                            Final Diagnosis       COLONIC MUCOSA, RANDOM BIOPSY:      NO SIGNIFICANT HISTOLOGIC ABNORMALITY.      Gross Description       The specimen is labeled \"CM\" and consists of a tan fragment measuring 0.7 x 0.4 x 0.2 cm.  Totally submitted.      Embedded Images     Results for orders placed or performed during the hospital encounter of 01/25/17   Fine Needle Aspiration   Result Value Ref Range    Case Report       Medical Cytology Report                           Case: [id number]                                 Authorizing Provider:  " Harry Person,   Collected:           01/25/2017 02:24 PM                                 MD                                                                           [facility]             Received:            01/25/2017 02:46 PM                                 [facility]                                                              Pathologist:           Basil Youngblood MD                                                         Specimen:    Thyroid, left                                                                              Final Diagnosis       [facility] Category: NON-DIAGNOSTIC               Comment       Follicular cells not identified. Abundant lymphocytes and colloid present.       Specimen Adequacy Unsatisfactory for evaluation. See comment.     Embedded Images     Results for orders placed or performed in visit on 01/11/17   CBC Auto Differential   Result Value Ref Range    WBC 4.86 3.20 - 9.80 10*3/mm3    RBC 4.18 3.77 - 5.16 10*6/mm3    Hemoglobin 12.7 12.0 - 15.5 g/dL    Hematocrit 38.4 35.0 - 45.0 %    MCV 91.9 80.0 - 98.0 fL    MCH 30.4 26.5 - 34.0 pg    MCHC 33.1 31.4 - 36.0 g/dL    RDW 13.6 11.5 - 14.5 %    RDW-SD 45.6 36.4 - 46.3 fl    MPV 11.6 8.0 - 12.0 fL    Platelets 254 150 - 450 10*3/mm3    Neutrophil % 39.9 37.0 - 80.0 %    Lymphocyte % 49.6 10.0 - 50.0 %    Monocyte % 7.6 0.0 - 12.0 %    Eosinophil % 2.3 0.0 - 7.0 %    Basophil % 0.4 0.0 - 2.0 %    Immature Grans % 0.2 0.0 - 0.5 %    Neutrophils, Absolute 1.94 (L) 2.00 - 8.60 10*3/mm3    Lymphocytes, Absolute 2.41 0.60 - 4.20 10*3/mm3    Monocytes, Absolute 0.37 0.00 - 0.90 10*3/mm3    Eosinophils, Absolute 0.11 0.00 - 0.70 10*3/mm3    Basophils, Absolute 0.02 0.00 - 0.20 10*3/mm3    Immature Grans, Absolute 0.01 0.00 - 0.02 10*3/mm3     *Note: Due to a large number of results and/or encounters for the requested time period, some results have not been displayed. A complete set of  results can be found in Results Review.

## 2018-01-11 ENCOUNTER — TELEPHONE (OUTPATIENT)
Dept: ENDOCRINOLOGY | Facility: CLINIC | Age: 63
End: 2018-01-11

## 2018-01-11 ENCOUNTER — APPOINTMENT (OUTPATIENT)
Dept: LAB | Facility: HOSPITAL | Age: 63
End: 2018-01-11

## 2018-01-11 DIAGNOSIS — E55.9 VITAMIN D DEFICIENCY: ICD-10-CM

## 2018-01-11 DIAGNOSIS — R94.6 ABNORMAL THYROID FUNCTION TEST: Primary | ICD-10-CM

## 2018-01-11 LAB
25(OH)D3 SERPL-MCNC: 39.7 NG/ML (ref 30–100)
ALBUMIN SERPL-MCNC: 4.2 G/DL (ref 3.4–4.8)
ALBUMIN/GLOB SERPL: 1.1 G/DL (ref 1.1–1.8)
ALP SERPL-CCNC: 101 U/L (ref 38–126)
ALT SERPL W P-5'-P-CCNC: 31 U/L (ref 9–52)
ANION GAP SERPL CALCULATED.3IONS-SCNC: 9 MMOL/L (ref 5–15)
AST SERPL-CCNC: 31 U/L (ref 14–36)
BASOPHILS # BLD AUTO: 0.02 10*3/MM3 (ref 0–0.2)
BASOPHILS NFR BLD AUTO: 0.5 % (ref 0–2)
BILIRUB SERPL-MCNC: 0.4 MG/DL (ref 0.2–1.3)
BUN BLD-MCNC: 14 MG/DL (ref 7–21)
BUN/CREAT SERPL: 18.2 (ref 7–25)
CALCIUM SPEC-SCNC: 9.6 MG/DL (ref 8.4–10.2)
CHLORIDE SERPL-SCNC: 104 MMOL/L (ref 95–110)
CO2 SERPL-SCNC: 26 MMOL/L (ref 22–31)
CREAT BLD-MCNC: 0.77 MG/DL (ref 0.5–1)
DEPRECATED RDW RBC AUTO: 43.4 FL (ref 36.4–46.3)
EOSINOPHIL # BLD AUTO: 0.07 10*3/MM3 (ref 0–0.7)
EOSINOPHIL NFR BLD AUTO: 1.6 % (ref 0–7)
ERYTHROCYTE [DISTWIDTH] IN BLOOD BY AUTOMATED COUNT: 13.3 % (ref 11.5–14.5)
GFR SERPL CREATININE-BSD FRML MDRD: 92 ML/MIN/1.73 (ref 45–104)
GLOBULIN UR ELPH-MCNC: 4 GM/DL (ref 2.3–3.5)
GLUCOSE BLD-MCNC: 84 MG/DL (ref 60–100)
HCT VFR BLD AUTO: 38.3 % (ref 35–45)
HGB BLD-MCNC: 12.6 G/DL (ref 12–15.5)
IMM GRANULOCYTES # BLD: 0 10*3/MM3 (ref 0–0.02)
IMM GRANULOCYTES NFR BLD: 0 % (ref 0–0.5)
LYMPHOCYTES # BLD AUTO: 2.24 10*3/MM3 (ref 0.6–4.2)
LYMPHOCYTES NFR BLD AUTO: 51.3 % (ref 10–50)
MCH RBC QN AUTO: 29.5 PG (ref 26.5–34)
MCHC RBC AUTO-ENTMCNC: 32.9 G/DL (ref 31.4–36)
MCV RBC AUTO: 89.7 FL (ref 80–98)
MONOCYTES # BLD AUTO: 0.45 10*3/MM3 (ref 0–0.9)
MONOCYTES NFR BLD AUTO: 10.3 % (ref 0–12)
NEUTROPHILS # BLD AUTO: 1.59 10*3/MM3 (ref 2–8.6)
NEUTROPHILS NFR BLD AUTO: 36.3 % (ref 37–80)
PLATELET # BLD AUTO: 243 10*3/MM3 (ref 150–450)
PMV BLD AUTO: 11.9 FL (ref 8–12)
POTASSIUM BLD-SCNC: 3.8 MMOL/L (ref 3.5–5.1)
PROT SERPL-MCNC: 8.2 G/DL (ref 6.3–8.6)
RBC # BLD AUTO: 4.27 10*6/MM3 (ref 3.77–5.16)
SODIUM BLD-SCNC: 139 MMOL/L (ref 137–145)
T3 SERPL-MCNC: 133 NG/DL (ref 97–169)
T4 FREE SERPL-MCNC: 1.55 NG/DL (ref 0.78–2.19)
TSH SERPL DL<=0.05 MIU/L-ACNC: 1.75 MIU/ML (ref 0.46–4.68)
WBC NRBC COR # BLD: 4.37 10*3/MM3 (ref 3.2–9.8)

## 2018-01-11 PROCEDURE — 84443 ASSAY THYROID STIM HORMONE: CPT | Performed by: NURSE PRACTITIONER

## 2018-01-11 PROCEDURE — 36415 COLL VENOUS BLD VENIPUNCTURE: CPT | Performed by: NURSE PRACTITIONER

## 2018-01-11 PROCEDURE — 84480 ASSAY TRIIODOTHYRONINE (T3): CPT | Performed by: NURSE PRACTITIONER

## 2018-01-11 PROCEDURE — 80053 COMPREHEN METABOLIC PANEL: CPT | Performed by: NURSE PRACTITIONER

## 2018-01-11 PROCEDURE — 84439 ASSAY OF FREE THYROXINE: CPT | Performed by: NURSE PRACTITIONER

## 2018-01-11 PROCEDURE — 82306 VITAMIN D 25 HYDROXY: CPT | Performed by: NURSE PRACTITIONER

## 2018-01-11 PROCEDURE — 85025 COMPLETE CBC W/AUTO DIFF WBC: CPT | Performed by: NURSE PRACTITIONER

## 2018-01-15 ENCOUNTER — OFFICE VISIT (OUTPATIENT)
Dept: ENDOCRINOLOGY | Facility: CLINIC | Age: 63
End: 2018-01-15

## 2018-01-15 VITALS
HEART RATE: 66 BPM | HEIGHT: 65 IN | BODY MASS INDEX: 44.73 KG/M2 | WEIGHT: 268.5 LBS | DIASTOLIC BLOOD PRESSURE: 80 MMHG | SYSTOLIC BLOOD PRESSURE: 136 MMHG

## 2018-01-15 DIAGNOSIS — E04.2 NONTOXIC MULTINODULAR GOITER: ICD-10-CM

## 2018-01-15 DIAGNOSIS — E78.5 HYPERLIPIDEMIA, UNSPECIFIED HYPERLIPIDEMIA TYPE: ICD-10-CM

## 2018-01-15 DIAGNOSIS — E06.3 HASHIMOTO'S DISEASE: Primary | ICD-10-CM

## 2018-01-15 DIAGNOSIS — E78.2 MIXED HYPERLIPIDEMIA: ICD-10-CM

## 2018-01-15 DIAGNOSIS — E55.9 VITAMIN D DEFICIENCY: ICD-10-CM

## 2018-01-15 DIAGNOSIS — E04.1 NONTOXIC THYROID NODULE: ICD-10-CM

## 2018-01-15 PROCEDURE — 99214 OFFICE O/P EST MOD 30 MIN: CPT | Performed by: NURSE PRACTITIONER

## 2018-01-15 RX ORDER — ATORVASTATIN CALCIUM 20 MG/1
20 TABLET, FILM COATED ORAL NIGHTLY
Qty: 90 TABLET | Refills: 3 | Status: SHIPPED | OUTPATIENT
Start: 2018-01-15 | End: 2018-05-21 | Stop reason: SDDI

## 2018-01-15 RX ORDER — LEVOTHYROXINE SODIUM 0.1 MG/1
100 TABLET ORAL DAILY
Qty: 90 TABLET | Refills: 3 | Status: SHIPPED | OUTPATIENT
Start: 2018-01-15 | End: 2019-02-04 | Stop reason: SDUPTHER

## 2018-01-15 RX ORDER — ERGOCALCIFEROL 1.25 MG/1
50000 CAPSULE ORAL
Qty: 12 CAPSULE | Refills: 3 | Status: SHIPPED | OUTPATIENT
Start: 2018-01-15 | End: 2018-04-20 | Stop reason: SDUPTHER

## 2018-01-15 RX ORDER — PANTOPRAZOLE SODIUM 40 MG/1
40 TABLET, DELAYED RELEASE ORAL DAILY
Refills: 5 | COMMUNITY
Start: 2017-12-30 | End: 2018-05-21 | Stop reason: SDUPTHER

## 2018-01-15 NOTE — PROGRESS NOTES
Subjective    Rosa M Ferguson is a 62 y.o. female. she is here today for follow-up.    History of Present Illness       History of Present Illness  62 year old female comes for follow up of hypothyroidism diagnosed in 1987.     She was on  brandname synthroid 150 mcgs daily that she takes every morning on an empty stomach.         since last appt has lost 3 lbs     In addition she has noticed visible goiter but US is stable     She denies eye pain,  red eye, diplopia, of eyelid edema.     since last appt changed to armour thyroid and states she does feels some better        In regards to bone health, she doesn't drink milk . She occasionally eats yogurt.         She has vitamin D Deficiency but hasn't been taking it consistently      She has dyslipidemia on pravachol --changed to atorovastatin--she does not remember to take everynight            Evaluation history:  TSH   Date Value Ref Range Status   01/11/2018 1.750 0.460 - 4.680 mIU/mL Final     Free T4   Date Value Ref Range Status   01/11/2018 1.55 0.78 - 2.19 ng/dL Final       Current medications:  Current Outpatient Prescriptions   Medication Sig Dispense Refill   • atorvastatin (LIPITOR) 20 MG tablet Take 1 tablet by mouth Every Night. 90 tablet 3   • levothyroxine (SYNTHROID) 100 MCG tablet Take 1 tablet by mouth Daily. 90 tablet 3   • pantoprazole (PROTONIX) 40 MG EC tablet Take 40 mg by mouth Daily.  5   • vitamin D (ERGOCALCIFEROL) 22071 units capsule capsule Take 1 capsule by mouth Every 7 (Seven) Days. 12 capsule 3     No current facility-administered medications for this visit.        The following portions of the patient's history were reviewed and updated as appropriate:   Past Medical History:   Diagnosis Date   • Acquired hypothyroidism    • Astigmatism    • Contact dermatitis due to plants    • Dyslipidemia    • Elevated blood pressure reading without diagnosis of hypertension    • Generalized anxiety disorder    • Goiter    • Hashimoto's  thyroiditis    • History of varicose veins    • Hypothyroidism    • Multinodular goiter    • Myopia    • Obesity    • Pain in eye     etiol unknown      • Plantar fasciitis    • Rash    • Thyroid nodule    • Upper respiratory infection    • Urinary tract infectious disease    • Vitamin D deficiency      Past Surgical History:   Procedure Laterality Date   • BLADDER SUSPENSION  2011   • COLONOSCOPY N/A 3/6/2017    Procedure: COLONOSCOPY;  Surgeon: Edis Schaffer MD;  Location: [facility] ENDOSCOPY;  Service:    • CYSTOSCOPY  09/23/1999    Cystoscopy and left retrograde study. Stone passed with uretherocele.   • ENDOSCOPY N/A 10/9/2017    Procedure: ESOPHAGOGASTRODUODENOSCOPY possible dilation ;  Surgeon: Edis Schaffer MD;  Location: [facility] ENDOSCOPY;  Service:    • INJECTION OF MEDICATION  06/14/2016    Kenalog (1)     • TUBAL ABDOMINAL LIGATION  01/25/1992    Bilateral partial salpingectomy. Desires sterilization.     Family History   Problem Relation Age of Onset   • Breast cancer Other    • Cancer Other    • Coronary artery disease Other    • Diabetes Other    • Cancer Mother    • Heart disease Father    • Stroke Paternal Uncle      OB History     No data available        No Known Allergies  Social History     Social History   • Marital status: Single     Spouse name: N/A   • Number of children: N/A   • Years of education: N/A     Social History Main Topics   • Smoking status: Never Smoker   • Smokeless tobacco: Never Used   • Alcohol use Yes      Comment: socially   • Drug use: No   • Sexual activity: Defer     Other Topics Concern   • None     Social History Narrative       Review of Systems  Review of Systems   Constitutional: Negative for activity change, appetite change, diaphoresis and fatigue.   HENT: Negative for congestion, dental problem, facial swelling, sneezing, sore throat, tinnitus, trouble swallowing and voice change.    Eyes: Negative for photophobia, pain, discharge, redness, itching and visual  "disturbance.   Respiratory: Negative for apnea, cough, choking, chest tightness and shortness of breath.    Cardiovascular: Negative for chest pain, palpitations and leg swelling.   Gastrointestinal: Negative for abdominal distention, abdominal pain, constipation, diarrhea, nausea and vomiting.   Endocrine: Negative for cold intolerance, heat intolerance, polydipsia, polyphagia and polyuria.   Genitourinary: Negative for difficulty urinating, dysuria, frequency, hematuria and urgency.   Musculoskeletal: Negative for arthralgias, back pain, gait problem, joint swelling, myalgias, neck pain and neck stiffness.   Skin: Negative for color change, pallor, rash and wound.   Allergic/Immunologic: Negative for environmental allergies.   Neurological: Negative for dizziness, tremors, facial asymmetry, weakness, light-headedness, numbness and headaches.   Hematological: Negative for adenopathy. Does not bruise/bleed easily.   Psychiatric/Behavioral: Negative for agitation, behavioral problems, confusion and sleep disturbance.        Objective    /80 (BP Location: Right arm, Patient Position: Sitting, Cuff Size: Adult)  Pulse 66  Ht 165.1 cm (65\")  Wt 122 kg (268 lb 8 oz)  LMP  (LMP Unknown)  Breastfeeding? No  BMI 44.68 kg/m2  Physical Exam   Constitutional: She is oriented to person, place, and time. She appears well-developed and well-nourished. No distress.   HENT:   Head: Normocephalic and atraumatic.   Right Ear: External ear normal.   Left Ear: External ear normal.   Nose: Nose normal.   Eyes: Conjunctivae and EOM are normal. Pupils are equal, round, and reactive to light.   Neck: Normal range of motion. Neck supple. No tracheal deviation present. No thyromegaly present.   Cardiovascular: Normal rate, regular rhythm and normal heart sounds.    No murmur heard.  Pulmonary/Chest: Effort normal and breath sounds normal. No respiratory distress. She has no wheezes.   Abdominal: Soft. Bowel sounds are normal. " There is no tenderness. There is no rebound and no guarding.   Musculoskeletal: Normal range of motion. She exhibits no edema, tenderness or deformity.   Neurological: She is alert and oriented to person, place, and time. No cranial nerve deficit.   Skin: Skin is warm and dry. No rash noted.   Psychiatric: She has a normal mood and affect. Her behavior is normal. Judgment and thought content normal.       Lab Review  Lab Results   Component Value Date    TSH 1.750 01/11/2018     Lab Results   Component Value Date    FREET4 1.55 01/11/2018        Assessment/Plan      1. Hashimoto's disease    2. Nontoxic multinodular goiter    3. Hyperlipidemia, unspecified hyperlipidemia type    4. Nontoxic thyroid nodule    5. Mixed hyperlipidemia    6. Vitamin D deficiency    . This diagnosis was discussed and reviewed with the patient including the advantages of drug therapy.     1. Orders placed during this encounter include:  Orders Placed This Encounter   Procedures   • US Thyroid     Order Specific Question:   Reason for Exam:     Answer:   multinodular goiter   • Comprehensive Metabolic Panel   • TSH   • Vitamin D 25 Hydroxy   • Lipid Panel   • CBC & Differential     Order Specific Question:   Manual Differential     Answer:   No       Medications prescribed:  Outpatient Encounter Prescriptions as of 1/15/2018   Medication Sig Dispense Refill   • atorvastatin (LIPITOR) 20 MG tablet Take 1 tablet by mouth Every Night. 90 tablet 3   • levothyroxine (SYNTHROID) 100 MCG tablet Take 1 tablet by mouth Daily. 90 tablet 3   • pantoprazole (PROTONIX) 40 MG EC tablet Take 40 mg by mouth Daily.  5   • vitamin D (ERGOCALCIFEROL) 92534 units capsule capsule Take 1 capsule by mouth Every 7 (Seven) Days. 12 capsule 3   • [DISCONTINUED] atorvastatin (LIPITOR) 20 MG tablet Take 20 mg by mouth Every Night.     • [DISCONTINUED] levothyroxine (SYNTHROID) 100 MCG tablet Take 1 tablet by mouth Daily. 30 tablet 5   • [DISCONTINUED] vitamin D  (ERGOCALCIFEROL) 60435 units capsule capsule Take 1 capsule by mouth Every 7 (Seven) Days. 12 capsule 1   • [DISCONTINUED] pantoprazole (PROTONIX) 20 MG EC tablet Take 1 tablet (20 mg total) by mouth Daily 30 tablet 5     No facility-administered encounter medications on file as of 1/15/2018.      Plan Details  Hypothyroidism     Euthyroid on LT4 at 150 mcgs daily      wants to try armour thyroid     start with 90 mcgs daily , do 60 a.m. and 30 p.m.      July 2014     TSH - nl     Oct. 2014     TSH - nl     continue armour thyroid 60 in the am and 30 around 4 pm     May 2015     TSH - 2.05     keep armour 60 in the am and 30 pm     Nov. 2015     TSH - nl        2012  barium swallow and PFT to evaluate for extrinsic compression were normal     Her Thyroid US from Sept 2011 was personally reviewed ( goiter with subcentimeter nodules ) - repeat for Oct 2013 enlarged gland , pseudonodules, left inf cyst of 1.3 cms , no need for FNA--will need repeat U/S -- -     oct. 2015     thyroid u/s      stable , questionable area in the left inferior lobe      repeat u/s in 6 months--schedule for thyroid nodule, goiter     June 2016     TSH - 0.23     keep armour thyroid 60mg in the am and decrease to 15mg in the pm     Jan. 2017     TSH - 2.9     Keep current dosage           Lab Results   Component Value Date     TSH 1.720 07/03/2017      Wants to change to tirosint      tirosint 112 mcg daily      Repeat TSH in 4 weeks    Lab Results   Component Value Date    TSH 1.750 01/11/2018     Levothyroxine 100 mcg one daily      June 2016     Reviewed by Dr. Munoz the left 1.8 cm cystic thyroid nodule did measure 1.5 cm --appears low risk for malignancy repeat in 6 months - scheduled for Jan. 13, 2017--biopsy consistent with underactive thyroid      Repeat u/s in Jan. 2018      Notes Recorded by Harry Person MD on 2/1/2017 at 11:38 PM  Please call patient with results. Let her know that the biopsy was consistent with a  diagnosis of underactive thyroid, no malignancy. Her thyroid levels are normal so I suggest we don't change her dose.   Note for Me : FNA states insufficient but her US features are typical of Hashimoto's and the cytology shows lymphocytic infiltration as expected in Hashimoto's so I will not consider it insufficient. There is no need for repeat FNA of the lesion that I targeted since it would only show lymphocytic infiltration. Images clearly document adequate needle placement so mis sampling didn't occur. Pathology should distinguish with expected findings in hashimoto;s vs nondiagnostic.                      --     Vid D def -      4-14 at 20 on 50 th q 2 weeks , sometimes forgetting.     increase to weekly --please try to remember daily     July 2014  VitD - 19     Oct. 2014     Vit d - 31     change to once every 2 weeks     May 2015     Vit d - 26.4         June 2016     vit d - 26.2     cannot remember vitamin d weekly     change to OTC 2000 units daily     July 2017     Vitamin d - 36     Continue vitamin d            --     Dyslipidemia  4-14     HDL 60s  LDL 170s  Tg nl       pravachol 40 mg qhs   stop      start atorvastatin 20 mg qhs -- does not take      May 2015     Total chol - 249  TG- 60  HDL - 61  LDL - 176     our target is to be less than 160   not taking  please remember to take      Nov. 2015     LDL - 180     not taking cholesterol pill     June 2016     total chol - 224  Tg- 67  HDL - 58  LDL _ 153     not taking cholesterol med--- please take daily           Total Cholesterol   Date Value Ref Range Status   07/03/2017 278 (H) 0 - 199 mg/dL Final            Triglycerides   Date Value Ref Range Status   07/03/2017 73 20 - 199 mg/dL Final            HDL Cholesterol   Date Value Ref Range Status   07/03/2017 62 60 - 200 mg/dL Final            LDL Cholesterol    Date Value Ref Range Status   07/03/2017 153 (H) 1 - 129 mg/dL Final         Increase to Lipitor 40 mg one daily          -------------------                  4. Return in about 6 months (around 7/15/2018) for Recheck.

## 2018-01-24 ENCOUNTER — HOSPITAL ENCOUNTER (OUTPATIENT)
Dept: ULTRASOUND IMAGING | Facility: HOSPITAL | Age: 63
Discharge: HOME | End: 2018-01-24
Admitting: NURSE PRACTITIONER

## 2018-01-24 PROCEDURE — 76536 US EXAM OF HEAD AND NECK: CPT

## 2018-01-25 ENCOUNTER — TELEPHONE (OUTPATIENT)
Dept: ENDOCRINOLOGY | Facility: CLINIC | Age: 63
End: 2018-01-25

## 2018-01-25 NOTE — TELEPHONE ENCOUNTER
----- Message from RACHEL Zabala sent at 1/25/2018  3:16 PM CST -----  No changes in the nodule on the left side;

## 2018-04-20 RX ORDER — ERGOCALCIFEROL 1.25 MG/1
CAPSULE ORAL
Qty: 12 CAPSULE | Refills: 0 | Status: SHIPPED | OUTPATIENT
Start: 2018-04-20 | End: 2019-04-04 | Stop reason: SDUPTHER

## 2018-05-21 ENCOUNTER — OFFICE VISIT (OUTPATIENT)
Dept: GASTROENTEROLOGY | Facility: CLINIC | Age: 63
End: 2018-05-21

## 2018-05-21 VITALS
DIASTOLIC BLOOD PRESSURE: 76 MMHG | WEIGHT: 270.2 LBS | HEIGHT: 65 IN | SYSTOLIC BLOOD PRESSURE: 134 MMHG | BODY MASS INDEX: 45.02 KG/M2 | HEART RATE: 57 BPM

## 2018-05-21 DIAGNOSIS — K59.04 CHRONIC IDIOPATHIC CONSTIPATION: ICD-10-CM

## 2018-05-21 DIAGNOSIS — K21.00 GASTROESOPHAGEAL REFLUX DISEASE WITH ESOPHAGITIS: Primary | ICD-10-CM

## 2018-05-21 PROCEDURE — 99214 OFFICE O/P EST MOD 30 MIN: CPT | Performed by: NURSE PRACTITIONER

## 2018-05-21 RX ORDER — PANTOPRAZOLE SODIUM 40 MG/1
40 TABLET, DELAYED RELEASE ORAL DAILY
Qty: 30 TABLET | Refills: 11 | Status: SHIPPED | OUTPATIENT
Start: 2018-05-21 | End: 2019-05-21 | Stop reason: SDUPTHER

## 2018-05-21 NOTE — PATIENT INSTRUCTIONS
Constipation, Adult  Constipation is when a person has fewer bowel movements in a week than normal, has difficulty having a bowel movement, or has stools that are dry, hard, or larger than normal. Constipation may be caused by an underlying condition. It may become worse with age if a person takes certain medicines and does not take in enough fluids.  Follow these instructions at home:  Eating and drinking     · Eat foods that have a lot of fiber, such as fresh fruits and vegetables, whole grains, and beans.  · Limit foods that are high in fat, low in fiber, or overly processed, such as french fries, hamburgers, cookies, candies, and soda.  · Drink enough fluid to keep your urine clear or pale yellow.  General instructions   · Exercise regularly or as told by your health care provider.  · Go to the restroom when you have the urge to go. Do not hold it in.  · Take over-the-counter and prescription medicines only as told by your health care provider. These include any fiber supplements.  · Practice pelvic floor retraining exercises, such as deep breathing while relaxing the lower abdomen and pelvic floor relaxation during bowel movements.  · Watch your condition for any changes.  · Keep all follow-up visits as told by your health care provider. This is important.  Contact a health care provider if:  · You have pain that gets worse.  · You have a fever.  · You do not have a bowel movement after 4 days.  · You vomit.  · You are not hungry.  · You lose weight.  · You are bleeding from the anus.  · You have thin, pencil-like stools.  Get help right away if:  · You have a fever and your symptoms suddenly get worse.  · You leak stool or have blood in your stool.  · Your abdomen is bloated.  · You have severe pain in your abdomen.  · You feel dizzy or you faint.  This information is not intended to replace advice given to you by your health care provider. Make sure you discuss any questions you have with your health care  provider.  Document Released: 09/15/2005 Document Revised: 07/07/2017 Document Reviewed: 06/07/2017  [employer] Interactive Patient Education © 2017 [employer]  MyPlate from [employer]  The general, healthful diet is based on the 2010 Dietary Guidelines for Americans. The amount of food you need to eat from each food group depends on your age, sex, and level of physical activity and can be individualized by a dietitian. Go to ChooseMyPlate.gov for more information.  What do I need to know about the MyPlate plan?  · Enjoy your food, but eat less.  · Avoid oversized portions.  ¨ ½ of your plate should include fruits and vegetables.  ¨ ¼ of your plate should be grains.  ¨ ¼ of your plate should be protein.  Grains   · Make at least half of your grains whole grains.  · For a 2,000 calorie daily food plan, eat 6 oz every day.  · 1 oz is about 1 slice bread, 1 cup cereal, or ½ cup cooked rice, cereal, or pasta.  Vegetables   · Make half your plate fruits and vegetables.  · For a 2,000 calorie daily food plan, eat 2½ cups every day.  · 1 cup is about 1 cup raw or cooked vegetables or vegetable juice or 2 cups raw leafy greens.  Fruits   · Make half your plate fruits and vegetables.  · For a 2,000 calorie daily food plan, eat 2 cups every day.  · 1 cup is about 1 cup fruit or 100% fruit juice or ½ cup dried fruit.  Protein   · For a 2,000 calorie daily food plan, eat 5½ oz every day.  · 1 oz is about 1 oz meat, poultry, or fish, ¼ cup cooked beans, 1 egg, 1 Tbsp peanut butter, or ½ oz nuts or seeds.  Dairy   · Switch to fat-free or low-fat (1%) milk.  · For a 2,000 calorie daily food plan, eat 3 cups every day.  · 1 cup is about 1 cup milk or yogurt or soy milk (soy beverage), 1½ oz natural cheese, or 2 oz processed cheese.  Fats, Oils, and Empty Calories   · Only small amounts of oils are recommended.  · Empty calories are calories from solid fats or added sugars.  · Compare sodium in foods like soup, bread, and frozen meals.  Choose the foods with lower numbers.  · Drink water instead of sugary drinks.  What foods can I eat?  Grains   Whole grains such as whole wheat, quinoa, millet, and bulgur. Bread, rolls, and pasta made from whole grains. Brown or wild rice. Hot or cold cereals made from whole grains and without added sugar.  Vegetables   All fresh vegetables, especially fresh red, dark green, or orange vegetables. Peas and beans. Low-sodium frozen or canned vegetables prepared without added salt. Low-sodium vegetable juices.  Fruits   All fresh, frozen, and dried fruits. Canned fruit packed in water or fruit juice without added sugar. Fruit juices without added sugar.  Meats and Other Protein Sources   Boiled, baked, or grilled lean meat trimmed of fat. Skinless poultry. Fresh seafood and shellfish. Canned seafood packed in water. Unsalted nuts and unsalted nut butters. Tofu. Dried beans and pea. Eggs.  Dairy   Low-fat or fat-free milk, yogurt, and cheeses.  Sweets and Desserts   Frozen desserts made from low-fat milk.  Fats and Oils   Olive, peanut, and canola oils and margarine. Salad dressing and mayonnaise made from these oils.  Other   Soups and casseroles made from allowed ingredients and without added fat or salt.  The items listed above may not be a complete list of recommended foods or beverages. Contact your dietitian for more options.   What foods are not recommended?  Grains   Sweetened, low-fiber cereals. Packaged baked goods. Snack crackers and chips. Cheese crackers, butter crackers, and biscuits. Frozen waffles, sweet breads, doughnuts, pastries, packaged baking mixes, pancakes, cakes, and cookies.  Vegetables   Regular canned or frozen vegetables or vegetables prepared with salt. Canned tomatoes. Canned tomato sauce. Fried vegetables. Vegetables in cream sauce or cheese sauce.  Fruits   Fruits packed in syrup or made with added sugar.  Meats and Other Protein Sources   Marbled or fatty meats such as ribs. Poultry  with skin. Fried meats, poultry, eggs, or fish. Sausages, hot dogs, and deli meats such as pastrami, bologna, or salami.  Dairy   Whole milk, cream, cheeses made from whole milk, sour cream. Ice cream or yogurt made from whole milk or with added sugar.  Beverages   For adults, no more than one alcoholic drink per day. Regular soft drinks or other sugary beverages. Juice drinks.  Sweets and Desserts   Sugary or fatty desserts, candy, and other sweets.  Fats and Oils   Solid shortening or partially hydrogenated oils. Solid margarine. Margarine that contains trans fats. Butter.  The items listed above may not be a complete list of foods and beverages to avoid. Contact your dietitian for more information.   This information is not intended to replace advice given to you by your health care provider. Make sure you discuss any questions you have with your health care provider.  Document Released: 01/06/2009 Document Revised: 05/25/2017 Document Reviewed: 11/26/2014  [employer] Interactive Patient Education © 2017 [employer]  BMI for Adults  Body mass index (BMI) is a number that is calculated from a person's weight and height. In most adults, the number is used to find how much of an adult's weight is made up of fat. BMI is not as accurate as a direct measure of body fat.  How is BMI calculated?  BMI is calculated by dividing weight in kilograms by height in meters squared. It can also be calculated by dividing weight in pounds by height in inches squared, then multiplying the resulting number by 703. Charts are available to help you find your BMI quickly and easily without doing this calculation.  How is BMI interpreted?  Health care professionals use BMI charts to identify whether an adult is underweight, at a normal weight, or overweight based on the following guidelines:  · Underweight: BMI less than 18.5.  · Normal weight: BMI between 18.5 and 24.9.  · Overweight: BMI between 25 and 29.9.  · Obese: BMI of 30 and  above.  BMI is usually interpreted the same for males and females.  Weight includes both fat and muscle, so someone with a muscular build, such as an athlete, may have a BMI that is higher than 24.9. In cases like these, BMI may not accurately depict body fat. To determine if excess body fat is the cause of a BMI of 25 or higher, further assessments may need to be done by a health care provider.  Why is BMI a useful tool?  BMI is used to identify a possible weight problem that may be related to a medical problem or may increase the risk for medical problems. BMI can also be used to promote changes to reach a healthy weight.  This information is not intended to replace advice given to you by your health care provider. Make sure you discuss any questions you have with your health care provider.  Document Released: 08/29/2005 Document Revised: 04/27/2017 Document Reviewed: 05/15/2015  [employer] Interactive Patient Education © 2017 [employer]    Food Choices for Gastroesophageal Reflux Disease, Adult  When you have gastroesophageal reflux disease (GERD), the foods you eat and your eating habits are very important. Choosing the right foods can help ease your discomfort.  What guidelines do I need to follow?  · Choose fruits, vegetables, whole grains, and low-fat dairy products.  · Choose low-fat meat, fish, and poultry.  · Limit fats such as oils, salad dressings, butter, nuts, and avocado.  · Keep a food diary. This helps you identify foods that cause symptoms.  · Avoid foods that cause symptoms. These may be different for everyone.  · Eat small meals often instead of 3 large meals a day.  · Eat your meals slowly, in a place where you are relaxed.  · Limit fried foods.  · Cook foods using methods other than frying.  · Avoid drinking alcohol.  · Avoid drinking large amounts of liquids with your meals.  · Avoid bending over or lying down until 2-3 hours after eating.  What foods are not recommended?  These are some  foods and drinks that may make your symptoms worse:  Vegetables   Tomatoes. Tomato juice. Tomato and spaghetti sauce. Chili peppers. Onion and garlic. Horseradish.  Fruits   Oranges, grapefruit, and lemon (fruit and juice).  Meats   High-fat meats, fish, and poultry. This includes hot dogs, ribs, ham, sausage, salami, and kern.  Dairy   Whole milk and chocolate milk. Sour cream. Cream. Butter. Ice cream. Cream cheese.  Drinks   Coffee and tea. Bubbly (carbonated) drinks or energy drinks.  Condiments   Hot sauce. Barbecue sauce.  Sweets/Desserts   Chocolate and cocoa. Donuts. Peppermint and spearmint.  Fats and Oils   High-fat foods. This includes French fries and potato chips.  Other   Vinegar. Strong spices. This includes black pepper, white pepper, red pepper, cayenne, marte powder, cloves, ginger, and chili powder.  The items listed above may not be a complete list of foods and drinks to avoid. Contact your dietitian for more information.   This information is not intended to replace advice given to you by your health care provider. Make sure you discuss any questions you have with your health care provider.  Document Released: 06/18/2013 Document Revised: 05/25/2017 Document Reviewed: 10/22/2014  [email or web address] Interactive Patient Education © 2017 [employer]

## 2018-05-21 NOTE — PROGRESS NOTES
Chief Complaint   Patient presents with   • Constipation   • Heartburn       Subjective    Rosa M Ferguson is a 63 y.o. female. she is here today for follow-up.  63-year-old female presents for follow-up regarding GERD with esophagitis and constipation.  Reports symptoms have been about the same however she recently ran out of her PPI and has not yet picked up refill and has noted more acid reflux and nausea in the mornings.  At last visit we attempted to decrease PPI to 20 mg per day however that did not last for long we increased to 40 mg per day.  Regarding constipation she has tried lactulose and linzess which caused severe diarrhea and she was unable to continue.  Have discussed possibility of Amitiza however she reports her bowel habits are more regulated with use of fruit an over-the-counter laxative as needed.  States she has not required over-the-counter laxative very frequently.    Constipation   This is a chronic problem. The current episode started more than 1 month ago. The problem is unchanged. Her stool frequency is 4 to 5 times per week (about every 2-3 days ). The stool is described as formed. The patient is on a high fiber diet. She exercises regularly. There has been adequate water intake. Associated symptoms include nausea. Pertinent negatives include no abdominal pain, anorexia, back pain, bloating, diarrhea, difficulty urinating, fecal incontinence, fever, flatus, hematochezia, hemorrhoids or rectal pain (mild nausea recently ran out of PPI ). She has tried diet changes, fiber, laxatives and stool softeners for the symptoms.   Heartburn   She complains of belching, early satiety, heartburn (ran out of PPI 4 days ago ) and nausea. She reports no abdominal pain, no chest pain, no choking, no coughing, no dysphagia, no globus sensation, no hoarse voice or no sore throat. The problem occurs rarely. The problem has been waxing and waning. The heartburn is of mild intensity. The heartburn does not  wake her from sleep. The heartburn does not limit her activity. The heartburn doesn't change with position. The symptoms are aggravated by certain foods. Pertinent negatives include no fatigue. Risk factors include obesity. She has tried a PPI for the symptoms. The treatment provided moderate relief.     EGD was completed 10/9/17 and noted esophagitis, gastritis and normal duodenum.  On biopsy chronic gastritis, chronic esophagitis and duodenitis was noted.  No dysplasia or goblet cell metaplasia was noted.    Colonoscopy was completed 3/6/17  noted fair prep, normal perianal digital rectal exam and external/internal hemorrhoids.  Repeat recommended in 5 years for surveillance.  CT noted no acute abnormality.  Plan; follow-up in one year regarding GERD with esophagitis or may return to primary care provider for treatment of this.  I have offered patient to start Amitiza daily for constipation however she would prefer to use more natural measures.  follow-up in one year.  Return to office sooner if needed.     The following portions of the patient's history were reviewed and updated as appropriate:   Past Medical History:   Diagnosis Date   • Acquired hypothyroidism    • Astigmatism    • Contact dermatitis due to plants    • Dyslipidemia    • Elevated blood pressure reading without diagnosis of hypertension    • Generalized anxiety disorder    • Goiter    • Hashimoto's thyroiditis    • History of varicose veins    • Hypothyroidism    • Multinodular goiter    • Myopia    • Obesity    • Pain in eye     etiol unknown      • Plantar fasciitis    • Rash    • Thyroid nodule    • Upper respiratory infection    • Urinary tract infectious disease    • Vitamin D deficiency      Past Surgical History:   Procedure Laterality Date   • BLADDER SUSPENSION  2011   • COLONOSCOPY N/A 3/6/2017    Procedure: COLONOSCOPY;  Surgeon: Edis Schaffer MD;  Location: [facility] ENDOSCOPY;  Service:    • CYSTOSCOPY  09/23/1999    Cystoscopy and  left retrograde study. Stone passed with uretherocele.   • ENDOSCOPY N/A 10/9/2017    Procedure: ESOPHAGOGASTRODUODENOSCOPY possible dilation ;  Surgeon: Edis Schaffer MD;  Location: [facility] ENDOSCOPY;  Service:    • INJECTION OF MEDICATION  06/14/2016    Kenalog (1)     • TUBAL ABDOMINAL LIGATION  01/25/1992    Bilateral partial salpingectomy. Desires sterilization.     Family History   Problem Relation Age of Onset   • Breast cancer Other    • Cancer Other    • Coronary artery disease Other    • Diabetes Other    • Cancer Mother    • Heart disease Father    • Stroke Paternal Uncle      OB History     No data available        Current Outpatient Prescriptions   Medication Sig Dispense Refill   • levothyroxine (SYNTHROID) 100 MCG tablet Take 1 tablet by mouth Daily. 90 tablet 3   • vitamin D (ERGOCALCIFEROL) 53933 units capsule capsule TAKE 1 CAPSULE BY MOUTH EVERY 7 DAYS 12 capsule 0   • pantoprazole (PROTONIX) 40 MG EC tablet Take 1 tablet by mouth Daily. 30 tablet 11     No current facility-administered medications for this visit.      No Known Allergies  Social History     Social History   • Marital status: Single     Social History Main Topics   • Smoking status: Never Smoker   • Smokeless tobacco: Never Used   • Alcohol use Yes      Comment: socially   • Drug use: No   • Sexual activity: Defer     Other Topics Concern   • Not on file       Review of Systems  Review of Systems   Constitutional: Negative for activity change, appetite change, chills, diaphoresis, fatigue, fever and unexpected weight change.   HENT: Negative for hoarse voice, sore throat and trouble swallowing.    Respiratory: Negative for cough, choking and shortness of breath.    Cardiovascular: Negative for chest pain.   Gastrointestinal: Positive for abdominal distention (bloating and belching), constipation, heartburn (ran out of PPI 4 days ago ) and nausea. Negative for abdominal pain, anal bleeding, anorexia, bloating, blood in stool,  "diarrhea, dysphagia, flatus, hematochezia, hemorrhoids and rectal pain (mild nausea recently ran out of PPI ).   Genitourinary: Negative for difficulty urinating.   Musculoskeletal: Negative for back pain.   Skin: Negative for pallor.   Neurological: Negative for light-headedness.        /76   Pulse 57   Ht 165.1 cm (65\")   Wt 123 kg (270 lb 3.2 oz)   LMP  (LMP Unknown)   BMI 44.96 kg/m²     Objective    Physical Exam   Constitutional: She is oriented to person, place, and time. She appears well-developed and well-nourished. She is cooperative. No distress.   HENT:   Head: Normocephalic and atraumatic.   Neck: Normal range of motion. Neck supple. No thyromegaly present.   Cardiovascular: Normal rate, regular rhythm and normal heart sounds.    Pulmonary/Chest: Effort normal and breath sounds normal. She has no wheezes. She has no rhonchi. She has no rales.   Abdominal: Soft. Normal appearance and bowel sounds are normal. She exhibits no distension. There is no hepatosplenomegaly. There is no tenderness. There is no rigidity and no guarding. No hernia.   Lymphadenopathy:     She has no cervical adenopathy.   Neurological: She is alert and oriented to person, place, and time.   Skin: Skin is warm, dry and intact. No rash noted. No pallor.   Psychiatric: She has a normal mood and affect. Her speech is normal.     Orders Only on 01/11/2018   Component Date Value Ref Range Status   • Glucose 01/11/2018 84  60 - 100 mg/dL Final   • BUN 01/11/2018 14  7 - 21 mg/dL Final   • Creatinine 01/11/2018 0.77  0.50 - 1.00 mg/dL Final   • Sodium 01/11/2018 139  137 - 145 mmol/L Final   • Potassium 01/11/2018 3.8  3.5 - 5.1 mmol/L Final   • Chloride 01/11/2018 104  95 - 110 mmol/L Final   • CO2 01/11/2018 26.0  22.0 - 31.0 mmol/L Final   • Calcium 01/11/2018 9.6  8.4 - 10.2 mg/dL Final   • Total Protein 01/11/2018 8.2  6.3 - 8.6 g/dL Final   • Albumin 01/11/2018 4.20  3.40 - 4.80 g/dL Final   • ALT (SGPT) 01/11/2018 31  9 - " 52 U/L Final   • AST (SGOT) 01/11/2018 31  14 - 36 U/L Final   • Alkaline Phosphatase 01/11/2018 101  38 - 126 U/L Final   • Total Bilirubin 01/11/2018 0.4  0.2 - 1.3 mg/dL Final   • eGFR   Amer 01/11/2018 92  45 - 104 mL/min/1.73 Final   • Globulin 01/11/2018 4.0* 2.3 - 3.5 gm/dL Final   • A/G Ratio 01/11/2018 1.1  1.1 - 1.8 g/dL Final   • BUN/Creatinine Ratio 01/11/2018 18.2  7.0 - 25.0 Final   • Anion Gap 01/11/2018 9.0  5.0 - 15.0 mmol/L Final   • TSH 01/11/2018 1.750  0.460 - 4.680 mIU/mL Final   • 25 Hydroxy, Vitamin D 01/11/2018 39.7  30.0 - 100.0 ng/ml Final   • Free T4 01/11/2018 1.55  0.78 - 2.19 ng/dL Final   • T3, Total 01/11/2018 133.0  97.0 - 169.0 ng/dl Final   • WBC 01/11/2018 4.37  3.20 - 9.80 10*3/mm3 Final   • RBC 01/11/2018 4.27  3.77 - 5.16 10*6/mm3 Final   • Hemoglobin 01/11/2018 12.6  12.0 - 15.5 g/dL Final   • Hematocrit 01/11/2018 38.3  35.0 - 45.0 % Final   • MCV 01/11/2018 89.7  80.0 - 98.0 fL Final   • MCH 01/11/2018 29.5  26.5 - 34.0 pg Final   • MCHC 01/11/2018 32.9  31.4 - 36.0 g/dL Final   • RDW 01/11/2018 13.3  11.5 - 14.5 % Final   • RDW-SD 01/11/2018 43.4  36.4 - 46.3 fl Final   • MPV 01/11/2018 11.9  8.0 - 12.0 fL Final   • Platelets 01/11/2018 243  150 - 450 10*3/mm3 Final   • Neutrophil % 01/11/2018 36.3* 37.0 - 80.0 % Final   • Lymphocyte % 01/11/2018 51.3* 10.0 - 50.0 % Final   • Monocyte % 01/11/2018 10.3  0.0 - 12.0 % Final   • Eosinophil % 01/11/2018 1.6  0.0 - 7.0 % Final   • Basophil % 01/11/2018 0.5  0.0 - 2.0 % Final   • Immature Grans % 01/11/2018 0.0  0.0 - 0.5 % Final   • Neutrophils, Absolute 01/11/2018 1.59* 2.00 - 8.60 10*3/mm3 Final   • Lymphocytes, Absolute 01/11/2018 2.24  0.60 - 4.20 10*3/mm3 Final   • Monocytes, Absolute 01/11/2018 0.45  0.00 - 0.90 10*3/mm3 Final   • Eosinophils, Absolute 01/11/2018 0.07  0.00 - 0.70 10*3/mm3 Final   • Basophils, Absolute 01/11/2018 0.02  0.00 - 0.20 10*3/mm3 Final   • Immature Grans, Absolute 01/11/2018 0.00  0.00 -  0.02 10*3/mm3 Final     Assessment/Plan      1. Gastroesophageal reflux disease with esophagitis    2. Chronic idiopathic constipation    .     Review and/or summary of lab tests, radiology, procedures, medications. Review and summary of old records and obtaining of history. The risks and benefits of my recommendations, as well as other treatment options were discussed with the patient today. Questions were answered.    New Medications Ordered This Visit   Medications   • pantoprazole (PROTONIX) 40 MG EC tablet     Sig: Take 1 tablet by mouth Daily.     Dispense:  30 tablet     Refill:  11       Follow-up: Return in about 1 year (around 5/21/2019).          This document has been electronically signed by RACHEL Lowry on May 21, 2018 10:01 AM             Results for orders placed or performed in visit on 01/11/18   CBC Auto Differential   Result Value Ref Range    WBC 4.37 3.20 - 9.80 10*3/mm3    RBC 4.27 3.77 - 5.16 10*6/mm3    Hemoglobin 12.6 12.0 - 15.5 g/dL    Hematocrit 38.3 35.0 - 45.0 %    MCV 89.7 80.0 - 98.0 fL    MCH 29.5 26.5 - 34.0 pg    MCHC 32.9 31.4 - 36.0 g/dL    RDW 13.3 11.5 - 14.5 %    RDW-SD 43.4 36.4 - 46.3 fl    MPV 11.9 8.0 - 12.0 fL    Platelets 243 150 - 450 10*3/mm3    Neutrophil % 36.3 (L) 37.0 - 80.0 %    Lymphocyte % 51.3 (H) 10.0 - 50.0 %    Monocyte % 10.3 0.0 - 12.0 %    Eosinophil % 1.6 0.0 - 7.0 %    Basophil % 0.5 0.0 - 2.0 %    Immature Grans % 0.0 0.0 - 0.5 %    Neutrophils, Absolute 1.59 (L) 2.00 - 8.60 10*3/mm3    Lymphocytes, Absolute 2.24 0.60 - 4.20 10*3/mm3    Monocytes, Absolute 0.45 0.00 - 0.90 10*3/mm3    Eosinophils, Absolute 0.07 0.00 - 0.70 10*3/mm3    Basophils, Absolute 0.02 0.00 - 0.20 10*3/mm3    Immature Grans, Absolute 0.00 0.00 - 0.02 10*3/mm3   Vitamin D 25 Hydroxy   Result Value Ref Range    25 Hydroxy, Vitamin D 39.7 30.0 - 100.0 ng/ml   T3   Result Value Ref Range    T3, Total 133.0 97.0 - 169.0 ng/dl   TSH   Result Value Ref Range    TSH 1.750 0.460 -  4.680 mIU/mL   T4, Free   Result Value Ref Range    Free T4 1.55 0.78 - 2.19 ng/dL   Comprehensive Metabolic Panel   Result Value Ref Range    Glucose 84 60 - 100 mg/dL    BUN 14 7 - 21 mg/dL    Creatinine 0.77 0.50 - 1.00 mg/dL    Sodium 139 137 - 145 mmol/L    Potassium 3.8 3.5 - 5.1 mmol/L    Chloride 104 95 - 110 mmol/L    CO2 26.0 22.0 - 31.0 mmol/L    Calcium 9.6 8.4 - 10.2 mg/dL    Total Protein 8.2 6.3 - 8.6 g/dL    Albumin 4.20 3.40 - 4.80 g/dL    ALT (SGPT) 31 9 - 52 U/L    AST (SGOT) 31 14 - 36 U/L    Alkaline Phosphatase 101 38 - 126 U/L    Total Bilirubin 0.4 0.2 - 1.3 mg/dL    eGFR   Amer 92 45 - 104 mL/min/1.73    Globulin 4.0 (H) 2.3 - 3.5 gm/dL    A/G Ratio 1.1 1.1 - 1.8 g/dL    BUN/Creatinine Ratio 18.2 7.0 - 25.0    Anion Gap 9.0 5.0 - 15.0 mmol/L   Results for orders placed or performed in visit on 11/07/17   Lipase   Result Value Ref Range    Lipase 74 23 - 300 U/L   Amylase   Result Value Ref Range    Amylase 91 50 - 130 U/L   Comprehensive Metabolic Panel   Result Value Ref Range    Glucose 81 60 - 100 mg/dL    BUN 10 7 - 21 mg/dL    Creatinine 0.72 0.50 - 1.00 mg/dL    Sodium 143 137 - 145 mmol/L    Potassium 4.0 3.5 - 5.1 mmol/L    Chloride 104 95 - 110 mmol/L    CO2 26.0 22.0 - 31.0 mmol/L    Calcium 9.6 8.4 - 10.2 mg/dL    Total Protein 8.3 6.3 - 8.6 g/dL    Albumin 4.20 3.40 - 4.80 g/dL    ALT (SGPT) 35 9 - 52 U/L    AST (SGOT) 29 14 - 36 U/L    Alkaline Phosphatase 94 38 - 126 U/L    Total Bilirubin 0.4 0.2 - 1.3 mg/dL    eGFR  African Amer 100 45 - 104 mL/min/1.73    Globulin 4.1 (H) 2.3 - 3.5 gm/dL    A/G Ratio 1.0 (L) 1.1 - 1.8 g/dL    BUN/Creatinine Ratio 13.9 7.0 - 25.0    Anion Gap 13.0 5.0 - 15.0 mmol/L   Results for orders placed or performed during the hospital encounter of 10/09/17   Tissue Pathology Exam - Tissue, Small Intestine, Duodenum   Result Value Ref Range    Case Report       Surgical Pathology Report                         Case: [id number]                                   Authorizing Provider:  Edis Schaffer MD         Collected:           10/09/2017 03:23 PM          Ordering Location:     [facility]             Received:            10/10/2017 08:16 AM                                 [facility]                                                     Pathologist:           Basil Youngblood MD                                                         Specimens:   1) - Small Intestine, Duodenum, small bowel                                                         2) - Gastric, Antrum                                                                                3) - Esophagus, Distal                                                                     Final Diagnosis       1.  DUODENUM, MUCOSAL BIOPSY:  MILD CHRONIC INFLAMMATION.    2.  GASTRIC ANTRUM, MUCOSAL BIOPSY:  MILD CHRONIC GASTRITIS.  NO EVIDENCE OF HELICOBACTER PYLORI.    3.  DISTAL ESOPHAGUS, MUCOSAL BIOPSY:  SQUAMOUS AND COLUMNAR LINED MUCOSA WITH MILD CHRONIC INFLAMMATION.      Gross Description       In 3 containers, showing mucosal biopsies which measure up to 0.3 cm in greatest dimension.  All embedded and labeled accordingly.  1A duodenum; 2A antrum; 3A distal esophagus.      Embedded Images     Results for orders placed or performed in visit on 08/14/17   CBC Auto Differential   Result Value Ref Range    WBC 4.95 3.20 - 9.80 10*3/mm3    RBC 4.18 3.77 - 5.16 10*6/mm3    Hemoglobin 12.6 12.0 - 15.5 g/dL    Hematocrit 38.5 35.0 - 45.0 %    MCV 92.1 80.0 - 98.0 fL    MCH 30.1 26.5 - 34.0 pg    MCHC 32.7 31.4 - 36.0 g/dL    RDW 13.3 11.5 - 14.5 %    RDW-SD 44.4 36.4 - 46.3 fl    MPV 11.6 8.0 - 12.0 fL    Platelets 244 150 - 450 10*3/mm3    Neutrophil % 39.4 37.0 - 80.0 %    Lymphocyte % 48.9 10.0 - 50.0 %    Monocyte % 8.7 0.0 - 12.0 %    Eosinophil % 2.6 0.0 - 7.0 %    Basophil % 0.2 0.0 - 2.0 %    Immature Grans % 0.2 0.0 - 0.5 %    Neutrophils, Absolute 1.95 (L) 2.00 - 8.60 10*3/mm3     Lymphocytes, Absolute 2.42 0.60 - 4.20 10*3/mm3    Monocytes, Absolute 0.43 0.00 - 0.90 10*3/mm3    Eosinophils, Absolute 0.13 0.00 - 0.70 10*3/mm3    Basophils, Absolute 0.01 0.00 - 0.20 10*3/mm3    Immature Grans, Absolute 0.01 0.00 - 0.02 10*3/mm3   Comprehensive metabolic panel   Result Value Ref Range    Glucose 88 60 - 100 mg/dL    BUN 13 7 - 21 mg/dL    Creatinine 0.72 0.50 - 1.00 mg/dL    Sodium 137 137 - 145 mmol/L    Potassium 3.7 3.5 - 5.1 mmol/L    Chloride 103 95 - 110 mmol/L    CO2 25.0 22.0 - 31.0 mmol/L    Calcium 9.4 8.4 - 10.2 mg/dL    Total Protein 8.1 6.3 - 8.6 g/dL    Albumin 4.20 3.40 - 4.80 g/dL    ALT (SGPT) 35 9 - 52 U/L    AST (SGOT) 29 14 - 36 U/L    Alkaline Phosphatase 107 38 - 126 U/L    Total Bilirubin 0.5 0.2 - 1.3 mg/dL    eGFR  African Amer 100 45 - 104 mL/min/1.73    Globulin 3.9 (H) 2.3 - 3.5 gm/dL    A/G Ratio 1.1 1.1 - 1.8 g/dL    BUN/Creatinine Ratio 18.1 7.0 - 25.0    Anion Gap 9.0 5.0 - 15.0 mmol/L     *Note: Due to a large number of results and/or encounters for the requested time period, some results have not been displayed. A complete set of results can be found in Results Review.

## 2018-05-22 RX ORDER — PANTOPRAZOLE SODIUM 40 MG/1
40 TABLET, DELAYED RELEASE ORAL DAILY
Qty: 30 TABLET | Refills: 0 | OUTPATIENT
Start: 2018-05-22

## 2018-07-11 ENCOUNTER — APPOINTMENT (OUTPATIENT)
Dept: LAB | Facility: HOSPITAL | Age: 63
End: 2018-07-11

## 2018-07-11 LAB
25(OH)D3 SERPL-MCNC: 42.6 NG/ML (ref 30–100)
ALBUMIN SERPL-MCNC: 4.3 G/DL (ref 3.4–4.8)
ALBUMIN/GLOB SERPL: 1.1 G/DL (ref 1.1–1.8)
ALP SERPL-CCNC: 93 U/L (ref 38–126)
ALT SERPL W P-5'-P-CCNC: 19 U/L (ref 9–52)
ANION GAP SERPL CALCULATED.3IONS-SCNC: 8 MMOL/L (ref 5–15)
ARTICHOKE IGE QN: 144 MG/DL (ref 1–129)
AST SERPL-CCNC: 40 U/L (ref 14–36)
BILIRUB SERPL-MCNC: 0.4 MG/DL (ref 0.2–1.3)
BUN BLD-MCNC: 13 MG/DL (ref 7–21)
BUN/CREAT SERPL: 19.4 (ref 7–25)
CALCIUM SPEC-SCNC: 9 MG/DL (ref 8.4–10.2)
CHLORIDE SERPL-SCNC: 105 MMOL/L (ref 95–110)
CHOLEST SERPL-MCNC: 254 MG/DL (ref 0–199)
CO2 SERPL-SCNC: 25 MMOL/L (ref 22–31)
CREAT BLD-MCNC: 0.67 MG/DL (ref 0.5–1)
DEPRECATED RDW RBC AUTO: 45.5 FL (ref 36.4–46.3)
EOSINOPHIL # BLD MANUAL: 0.04 10*3/MM3 (ref 0–0.7)
EOSINOPHIL NFR BLD MANUAL: 1 % (ref 0–7)
ERYTHROCYTE [DISTWIDTH] IN BLOOD BY AUTOMATED COUNT: 13.8 % (ref 11.5–14.5)
GFR SERPL CREATININE-BSD FRML MDRD: 108 ML/MIN/1.73 (ref 45–104)
GLOBULIN UR ELPH-MCNC: 3.8 GM/DL (ref 2.3–3.5)
GLUCOSE BLD-MCNC: 94 MG/DL (ref 60–100)
HCT VFR BLD AUTO: 38.4 % (ref 35–45)
HDLC SERPL-MCNC: 52 MG/DL (ref 60–200)
HGB BLD-MCNC: 12.8 G/DL (ref 12–15.5)
LDLC/HDLC SERPL: 3.58 {RATIO} (ref 0–3.22)
LYMPHOCYTES # BLD MANUAL: 2.59 10*3/MM3 (ref 0.6–4.2)
LYMPHOCYTES NFR BLD MANUAL: 6 % (ref 0–12)
LYMPHOCYTES NFR BLD MANUAL: 60 % (ref 10–50)
MCH RBC QN AUTO: 30.4 PG (ref 26.5–34)
MCHC RBC AUTO-ENTMCNC: 33.3 G/DL (ref 31.4–36)
MCV RBC AUTO: 91.2 FL (ref 80–98)
MONOCYTES # BLD AUTO: 0.26 10*3/MM3 (ref 0–0.9)
NEUTROPHILS # BLD AUTO: 1.43 10*3/MM3 (ref 2–8.6)
NEUTROPHILS NFR BLD MANUAL: 33 % (ref 37–80)
PLATELET # BLD AUTO: 246 10*3/MM3 (ref 150–450)
PMV BLD AUTO: 11.5 FL (ref 8–12)
POTASSIUM BLD-SCNC: 3.8 MMOL/L (ref 3.5–5.1)
PROT SERPL-MCNC: 8.1 G/DL (ref 6.3–8.6)
RBC # BLD AUTO: 4.21 10*6/MM3 (ref 3.77–5.16)
RBC MORPH BLD: NORMAL
SMALL PLATELETS BLD QL SMEAR: ADEQUATE
SODIUM BLD-SCNC: 138 MMOL/L (ref 137–145)
TRIGL SERPL-MCNC: 79 MG/DL (ref 20–199)
TSH SERPL DL<=0.05 MIU/L-ACNC: 1.34 MIU/ML (ref 0.46–4.68)
WBC MORPH BLD: NORMAL
WBC NRBC COR # BLD: 4.32 10*3/MM3 (ref 3.2–9.8)

## 2018-07-11 PROCEDURE — 85025 COMPLETE CBC W/AUTO DIFF WBC: CPT | Performed by: NURSE PRACTITIONER

## 2018-07-11 PROCEDURE — 80053 COMPREHEN METABOLIC PANEL: CPT | Performed by: NURSE PRACTITIONER

## 2018-07-11 PROCEDURE — 36415 COLL VENOUS BLD VENIPUNCTURE: CPT | Performed by: NURSE PRACTITIONER

## 2018-07-11 PROCEDURE — 84443 ASSAY THYROID STIM HORMONE: CPT | Performed by: NURSE PRACTITIONER

## 2018-07-11 PROCEDURE — 80061 LIPID PANEL: CPT | Performed by: NURSE PRACTITIONER

## 2018-07-11 PROCEDURE — 85007 BL SMEAR W/DIFF WBC COUNT: CPT | Performed by: NURSE PRACTITIONER

## 2018-07-11 PROCEDURE — 82306 VITAMIN D 25 HYDROXY: CPT | Performed by: NURSE PRACTITIONER

## 2018-07-18 ENCOUNTER — OFFICE VISIT (OUTPATIENT)
Dept: ENDOCRINOLOGY | Facility: CLINIC | Age: 63
End: 2018-07-18

## 2018-07-18 VITALS
BODY MASS INDEX: 44.15 KG/M2 | HEART RATE: 56 BPM | DIASTOLIC BLOOD PRESSURE: 80 MMHG | SYSTOLIC BLOOD PRESSURE: 124 MMHG | HEIGHT: 65 IN | WEIGHT: 265 LBS

## 2018-07-18 DIAGNOSIS — E78.2 MIXED HYPERLIPIDEMIA: ICD-10-CM

## 2018-07-18 DIAGNOSIS — E04.1 SOLITARY THYROID NODULE: Primary | ICD-10-CM

## 2018-07-18 DIAGNOSIS — E04.1 NONTOXIC THYROID NODULE: ICD-10-CM

## 2018-07-18 DIAGNOSIS — E55.9 VITAMIN D DEFICIENCY: ICD-10-CM

## 2018-07-18 DIAGNOSIS — E06.3 HASHIMOTO'S DISEASE: Primary | ICD-10-CM

## 2018-07-18 PROCEDURE — 99214 OFFICE O/P EST MOD 30 MIN: CPT | Performed by: NURSE PRACTITIONER

## 2018-07-18 NOTE — PROGRESS NOTES
Subjective    Rosa M Ferguson is a 63 y.o. female. she is here today for follow-up.    History of Present Illness       History of Present Illness        63 year old female comes for follow up of hypothyroidism diagnosed in 1987.     She was on  brandname synthroid 150 mcgs daily that she takes every morning on an empty stomach.         since last appt has lost 3 lbs     In addition she has noticed visible goiter but US is stable     She denies eye pain,  red eye, diplopia, of eyelid edema.     since last appt changed to armour thyroid and states she does feels some better        In regards to bone health, she doesn't drink milk . She occasionally eats yogurt.         She has vitamin D Deficiency but hasn't been taking it consistently      She has dyslipidemia on pravachol --changed to atorovastatin--she does not remember to take everynight                         Evaluation history:  TSH   Date Value Ref Range Status   07/11/2018 1.340 0.460 - 4.680 mIU/mL Final     Free T4   Date Value Ref Range Status   01/11/2018 1.55 0.78 - 2.19 ng/dL Final       Current medications:  Current Outpatient Prescriptions   Medication Sig Dispense Refill   • levothyroxine (SYNTHROID) 100 MCG tablet Take 1 tablet by mouth Daily. 90 tablet 3   • pantoprazole (PROTONIX) 40 MG EC tablet Take 1 tablet by mouth Daily. 30 tablet 11   • vitamin D (ERGOCALCIFEROL) 13827 units capsule capsule TAKE 1 CAPSULE BY MOUTH EVERY 7 DAYS 12 capsule 0     No current facility-administered medications for this visit.        The following portions of the patient's history were reviewed and updated as appropriate:   Past Medical History:   Diagnosis Date   • Acquired hypothyroidism    • Astigmatism    • Contact dermatitis due to plants    • Dyslipidemia    • Elevated blood pressure reading without diagnosis of hypertension    • Generalized anxiety disorder    • Goiter    • Hashimoto's thyroiditis    • History of varicose veins    • Hypothyroidism    •  Multinodular goiter    • Myopia    • Obesity    • Pain in eye     etiol unknown      • Plantar fasciitis    • Rash    • Thyroid nodule    • Upper respiratory infection    • Urinary tract infectious disease    • Vitamin D deficiency      Past Surgical History:   Procedure Laterality Date   • BLADDER SUSPENSION  2011   • COLONOSCOPY N/A 3/6/2017    Procedure: COLONOSCOPY;  Surgeon: Edis Schaffer MD;  Location: [facility] ENDOSCOPY;  Service:    • CYSTOSCOPY  09/23/1999    Cystoscopy and left retrograde study. Stone passed with uretherocele.   • ENDOSCOPY N/A 10/9/2017    Procedure: ESOPHAGOGASTRODUODENOSCOPY possible dilation ;  Surgeon: Edis Schaffer MD;  Location: [facility] ENDOSCOPY;  Service:    • INJECTION OF MEDICATION  06/14/2016    Kenalog (1)     • TUBAL ABDOMINAL LIGATION  01/25/1992    Bilateral partial salpingectomy. Desires sterilization.     Family History   Problem Relation Age of Onset   • Breast cancer Other    • Cancer Other    • Coronary artery disease Other    • Diabetes Other    • Cancer Mother    • Heart disease Father    • Stroke Paternal Uncle      OB History     No data available        No Known Allergies  Social History     Social History   • Marital status: Single     Social History Main Topics   • Smoking status: Never Smoker   • Smokeless tobacco: Never Used   • Alcohol use Yes      Comment: socially   • Drug use: No   • Sexual activity: Defer     Other Topics Concern   • Not on file       Review of Systems  Review of Systems   Constitutional: Negative for activity change, appetite change, diaphoresis and fatigue.   HENT: Negative for facial swelling, sneezing, sore throat, tinnitus, trouble swallowing and voice change.    Eyes: Negative for photophobia, pain, discharge, redness, itching and visual disturbance.   Respiratory: Negative for apnea, cough, choking, chest tightness and shortness of breath.    Cardiovascular: Negative for chest pain, palpitations and leg swelling.  "  Gastrointestinal: Negative for abdominal distention, abdominal pain, constipation, diarrhea, nausea and vomiting.   Endocrine: Negative for cold intolerance, heat intolerance, polydipsia, polyphagia and polyuria.   Genitourinary: Negative for difficulty urinating, dysuria, frequency, hematuria and urgency.   Musculoskeletal: Positive for arthralgias and myalgias. Negative for back pain, gait problem, joint swelling, neck pain and neck stiffness.   Skin: Negative for color change, pallor, rash and wound.   Neurological: Negative for dizziness, tremors, weakness, light-headedness, numbness and headaches.   Hematological: Negative for adenopathy. Does not bruise/bleed easily.   Psychiatric/Behavioral: Negative for behavioral problems, confusion and sleep disturbance.        Objective    /80 (BP Location: Right arm, Patient Position: Sitting, Cuff Size: Adult)   Pulse 56   Ht 165.1 cm (65\")   Wt 120 kg (265 lb)   LMP  (LMP Unknown)   BMI 44.10 kg/m²   Physical Exam   Constitutional: She is oriented to person, place, and time. She appears well-developed and well-nourished. No distress.   HENT:   Head: Normocephalic and atraumatic.   Right Ear: External ear normal.   Left Ear: External ear normal.   Nose: Nose normal.   Eyes: Pupils are equal, round, and reactive to light. Conjunctivae and EOM are normal.   Neck: Normal range of motion. Neck supple. No tracheal deviation present. Thyromegaly present.   30 gm gland   Cardiovascular: Normal rate, regular rhythm and normal heart sounds.    No murmur heard.  Pulmonary/Chest: Effort normal and breath sounds normal. No respiratory distress. She has no wheezes.   Abdominal: Soft. Bowel sounds are normal. There is no tenderness. There is no rebound and no guarding.   Musculoskeletal: Normal range of motion. She exhibits no edema, tenderness or deformity.   Neurological: She is alert and oriented to person, place, and time. No cranial nerve deficit.   Skin: Skin is " warm and dry. No rash noted.   Psychiatric: She has a normal mood and affect. Her behavior is normal. Judgment and thought content normal.       Lab Review  Lab Results   Component Value Date    TSH 1.340 07/11/2018     Lab Results   Component Value Date    FREET4 1.55 01/11/2018        Assessment/Plan      1. Hashimoto's disease    2. Nontoxic thyroid nodule    3. Vitamin D deficiency    4. Mixed hyperlipidemia    . This diagnosis was discussed and reviewed with the patient including the advantages of drug therapy.     1. Orders placed during this encounter include:  Orders Placed This Encounter   Procedures   • US Thyroid     Needs in Jan. 2019     Standing Status:   Future     Standing Expiration Date:   7/18/2019     Order Specific Question:   Reason for Exam:     Answer:   nontoxic multinodular goiter       Medications prescribed:  Outpatient Encounter Prescriptions as of 7/18/2018   Medication Sig Dispense Refill   • levothyroxine (SYNTHROID) 100 MCG tablet Take 1 tablet by mouth Daily. 90 tablet 3   • pantoprazole (PROTONIX) 40 MG EC tablet Take 1 tablet by mouth Daily. 30 tablet 11   • vitamin D (ERGOCALCIFEROL) 00612 units capsule capsule TAKE 1 CAPSULE BY MOUTH EVERY 7 DAYS 12 capsule 0     No facility-administered encounter medications on file as of 7/18/2018.      Plan Details  Hypothyroidism        tirosint 112 mcg daily      Repeat TSH in 4 weeks           Lab Results   Component Value Date     TSH 1.750 01/11/2018      Levothyroxine 100 mcg one daily         Lab Results   Component Value Date    TSH 1.340 07/11/2018          --------------------------------------------------------------      Nontoxic multinodular goiter      2012  barium swallow and PFT to evaluate for extrinsic compression were normal     Her Thyroid US from Sept 2011 was personally reviewed ( goiter with subcentimeter nodules ) - repeat for Oct 2013 enlarged gland , pseudonodules, left inf cyst of 1.3 cms , no need for FNA--will  need repeat U/S -- -     oct. 2015     thyroid u/s      stable , questionable area in the left inferior lobe              June 2016     Reviewed by Dr. Munoz the left 1.8 cm cystic thyroid nodule did measure 1.5 cm --appears low risk for malignancy repeat in 6 months - scheduled for Jan. 13, 2017--biopsy consistent with underactive thyroid                  Notes Recorded by Harry Person MD on 2/1/2017 at 11:38 PM  Please call patient with results. Let her know that the biopsy was consistent with a diagnosis of underactive thyroid, no malignancy. Her thyroid levels are normal so I suggest we don't change her dose.   Note for Me : FNA states insufficient but her US features are typical of Hashimoto's and the cytology shows lymphocytic infiltration as expected in Hashimoto's so I will not consider it insufficient. There is no need for repeat FNA of the lesion that I targeted since it would only show lymphocytic infiltration. Images clearly document adequate needle placement so mis sampling didn't occur. Pathology should distinguish with expected findings in hashimoto;s vs nondiagnostic.                       Repeat ultrasound Jan. 2018    No changes - stable exam       -----------------------------------------------------     Vid D def -           change to OTC 2000 units daily     July 2017     Vitamin d - 36     Continue vitamin d         Component      Latest Ref Rng & Units 7/11/2018   25 Hydroxy, Vitamin D      30.0 - 100.0 ng/ml 42.6        ------------------------------------------------------------------------------------     Dyslipidemia               Increase to Lipitor 40 mg one daily         Component      Latest Ref Rng & Units 7/11/2018   Total Cholesterol      0 - 199 mg/dL 254 (H)   Triglycerides      20 - 199 mg/dL 79   HDL Cholesterol      60 - 200 mg/dL 52 (L)   LDL Cholesterol       1 - 129 mg/dL 144 (H)   LDL/HDL Ratio      0.00 - 3.22 3.58 (H)         She is not taking the Lipitor  worried about possible side effects agrees to take the 20 mg     -------------------          4. Return in about 6 months (around 1/18/2019) for Recheck.

## 2018-07-30 ENCOUNTER — OFFICE VISIT (OUTPATIENT)
Dept: PODIATRY | Facility: CLINIC | Age: 63
End: 2018-07-30

## 2018-07-30 VITALS — BODY MASS INDEX: 44.15 KG/M2 | HEIGHT: 65 IN | WEIGHT: 265 LBS

## 2018-07-30 DIAGNOSIS — M79.671 FOOT PAIN, RIGHT: ICD-10-CM

## 2018-07-30 DIAGNOSIS — M21.42 PES PLANUS OF BOTH FEET: ICD-10-CM

## 2018-07-30 DIAGNOSIS — M21.41 PES PLANUS OF BOTH FEET: ICD-10-CM

## 2018-07-30 DIAGNOSIS — M76.829 PTTD (POSTERIOR TIBIAL TENDON DYSFUNCTION): Primary | ICD-10-CM

## 2018-07-30 PROCEDURE — 99213 OFFICE O/P EST LOW 20 MIN: CPT | Performed by: PODIATRIST

## 2018-07-30 RX ORDER — NABUMETONE 500 MG/1
500 TABLET, FILM COATED ORAL 2 TIMES DAILY PRN
Qty: 30 TABLET | Refills: 0 | Status: SHIPPED | OUTPATIENT
Start: 2018-07-30 | End: 2018-07-30 | Stop reason: SDUPTHER

## 2018-07-30 NOTE — PROGRESS NOTES
Rosa M Ferguson  1955  63 y.o. female   07/30/2018    Chief Complaint   Patient presents with   • Right Foot - Pain           History of Present Illness    Ms. Ferguson is a 63-year-old female who presents For evaluation of recurrent right foot pain.  She was previously seen in 2016 for right heel pain.  At that time we did get her some custom orthotics which she put in her work shoes and were consistently.  She states since she has retired she has not been wearing her orthotics as consistently and has been getting inside of her foot and ankle pain.  She denies any trauma or injuries.  Denies any significant prior treatments for this issue.    Past Medical History:   Diagnosis Date   • Acquired hypothyroidism    • Astigmatism    • Contact dermatitis due to plants    • Dyslipidemia    • Elevated blood pressure reading without diagnosis of hypertension    • Generalized anxiety disorder    • Goiter    • Hashimoto's thyroiditis    • History of varicose veins    • Hypothyroidism    • Multinodular goiter    • Myopia    • Obesity    • Pain in eye     etiol unknown      • Plantar fasciitis    • Rash    • Thyroid nodule    • Upper respiratory infection    • Urinary tract infectious disease    • Vitamin D deficiency          Past Surgical History:   Procedure Laterality Date   • BLADDER SUSPENSION  2011   • COLONOSCOPY N/A 3/6/2017    Procedure: COLONOSCOPY;  Surgeon: Edis Schaffer MD;  Location: [facility] ENDOSCOPY;  Service:    • CYSTOSCOPY  09/23/1999    Cystoscopy and left retrograde study. Stone passed with uretherocele.   • ENDOSCOPY N/A 10/9/2017    Procedure: ESOPHAGOGASTRODUODENOSCOPY possible dilation ;  Surgeon: Edis Schaffer MD;  Location: [facility] ENDOSCOPY;  Service:    • INJECTION OF MEDICATION  06/14/2016    Kenalog (1)     • TUBAL ABDOMINAL LIGATION  01/25/1992    Bilateral partial salpingectomy. Desires sterilization.         Family History   Problem Relation Age of Onset   • Breast cancer Other    •  "Cancer Other    • Coronary artery disease Other    • Diabetes Other    • Cancer Mother    • Heart disease Father    • Stroke Paternal Uncle          Social History     Social History   • Marital status: Single     Spouse name: N/A   • Number of children: N/A   • Years of education: N/A     Occupational History   • Not on file.     Social History Main Topics   • Smoking status: Never Smoker   • Smokeless tobacco: Never Used   • Alcohol use Yes      Comment: socially   • Drug use: No   • Sexual activity: Defer     Other Topics Concern   • Not on file     Social History Narrative   • No narrative on file         Current Outpatient Prescriptions   Medication Sig Dispense Refill   • levothyroxine (SYNTHROID) 100 MCG tablet Take 1 tablet by mouth Daily. 90 tablet 3   • pantoprazole (PROTONIX) 40 MG EC tablet Take 1 tablet by mouth Daily. 30 tablet 11   • vitamin D (ERGOCALCIFEROL) 69659 units capsule capsule TAKE 1 CAPSULE BY MOUTH EVERY 7 DAYS 12 capsule 0   • nabumetone (RELAFEN) 500 MG tablet Take 1 tablet by mouth 2 (Two) Times a Day As Needed for Mild Pain . 30 tablet 0     No current facility-administered medications for this visit.          OBJECTIVE    Ht 165.1 cm (65\")   Wt 120 kg (265 lb)   LMP  (LMP Unknown)   BMI 44.10 kg/m²       Review of Systems   Constitutional:  Denies recent weight loss, weight gain, fever or chills, no change in exercise tolerance  Musculoskeletal:Right foot and ankle pain  Skin:  No wounds or lesions  Neurological:  No paresthesias.  Psychiatric/Behavioral: Denies depression    Physical Exam   Constitutional: she appears well-developed and well-nourished.   Psychiatric: she has a normal mood and affect. her   behavior is normal.      Lower Extremity Exam:  Vascular: DP/PT pulses palpable 1+.   Negative hair growth.   Mild right ankle edema  Neuro: Protective sensation intact, b/l.  DTRs intact  Integument: No open wounds or lesions.  Xerotic plantar feet b/l.  No " Masses  Musculoskeletal: LE muscle strength 5/5.   Gait Normal  Mild equinus b/l   Tenderness to palpation of right posterior tibial tendon from posterior aspect of medial malleolus to the insertion on the navicular tuberosity.  Pes planus b/l      Procedures    Right foot radiographs- Normal osseous density. No acute fractures, subluxations or erosions.  Pes planus.        ASSESSMENT AND PLAN    Rosa M was seen today for pain.    Diagnoses and all orders for this visit:    PTTD (posterior tibial tendon dysfunction)    Foot pain, right  -     XR Foot 3+ View Right    Pes planus of both feet    Other orders  -     nabumetone (RELAFEN) 500 MG tablet; Take 1 tablet by mouth 2 (Two) Times a Day As Needed for Mild Pain .      -Comprehensive foot and ankle exam performed  -Radiographs ordered and reviewed  -Educated pt on diagnosis, etiology and treatment of PTTD with pes planus  -Recommend motion control shoe, custom insoles  -dispenced lace up ankle brace  -Rx Relafen BID  -Recheck 4 weeks              This document has been electronically signed by Alberto Gaytan DPM on July 30, 2018 1:26 PM

## 2018-07-31 RX ORDER — NABUMETONE 500 MG/1
TABLET, FILM COATED ORAL
Qty: 180 TABLET | Refills: 0 | Status: SHIPPED | OUTPATIENT
Start: 2018-07-31 | End: 2018-11-26

## 2018-08-22 ENCOUNTER — OFFICE VISIT (OUTPATIENT)
Dept: FAMILY MEDICINE CLINIC | Facility: CLINIC | Age: 63
End: 2018-08-22

## 2018-08-22 VITALS
WEIGHT: 262 LBS | OXYGEN SATURATION: 98 % | HEART RATE: 81 BPM | BODY MASS INDEX: 43.65 KG/M2 | HEIGHT: 65 IN | SYSTOLIC BLOOD PRESSURE: 144 MMHG | DIASTOLIC BLOOD PRESSURE: 90 MMHG

## 2018-08-22 DIAGNOSIS — M25.50 ARTHRALGIA, UNSPECIFIED JOINT: ICD-10-CM

## 2018-08-22 DIAGNOSIS — E06.3 HASHIMOTO'S DISEASE: ICD-10-CM

## 2018-08-22 DIAGNOSIS — D72.829 LEUKOCYTOSIS, UNSPECIFIED TYPE: ICD-10-CM

## 2018-08-22 DIAGNOSIS — E78.2 MIXED HYPERLIPIDEMIA: Primary | ICD-10-CM

## 2018-08-22 PROCEDURE — 99214 OFFICE O/P EST MOD 30 MIN: CPT | Performed by: FAMILY MEDICINE

## 2018-08-22 RX ORDER — ATORVASTATIN CALCIUM 20 MG/1
20 TABLET, FILM COATED ORAL DAILY
Qty: 90 TABLET | Refills: 1 | Status: SHIPPED | OUTPATIENT
Start: 2018-08-22 | End: 2019-02-11 | Stop reason: SDUPTHER

## 2018-08-22 NOTE — PROGRESS NOTES
Subjective   Rosa M Ferguson is a 63 y.o. female.   Cc:Establish care  History of Present Illness The patient comes in for check of their chronic medical issues which include arthralgias,Hypothyroidism and hypercholesterolemia. She still has some arthralgias in her knees and feet.She will get some swelling in her feet also .      The following portions of the patient's history were reviewed and updated as appropriate: allergies, current medications, past family history, past medical history, past social history, past surgical history and problem list.    Review of Systems   Constitutional: Negative for fatigue and fever.   Respiratory: Negative for cough, chest tightness and stridor.    Cardiovascular: Negative for chest pain, palpitations and leg swelling.   Musculoskeletal: Positive for arthralgias and back pain. Negative for myalgias.       Objective   Physical Exam   Constitutional: She appears well-developed and well-nourished.   HENT:   Head: Normocephalic and atraumatic.   Right Ear: External ear normal.   Left Ear: External ear normal.   Nose: Nose normal.   Mouth/Throat: Oropharynx is clear and moist.   Eyes: Pupils are equal, round, and reactive to light.   Cardiovascular: Normal rate, regular rhythm and normal heart sounds.  Exam reveals no gallop and no friction rub.    No murmur heard.  Pulmonary/Chest: Effort normal and breath sounds normal. No respiratory distress. She has no wheezes. She has no rales.   Abdominal: Soft. Bowel sounds are normal. She exhibits no distension. There is no tenderness. There is no guarding.   Musculoskeletal:   Tender in her knees . She has swelling in her left foot.   Skin: Skin is warm and dry.   Vitals reviewed.        Assessment/Plan   Rosa M was seen today for establish care.    Diagnoses and all orders for this visit:    Mixed hyperlipidemia    Hashimoto's disease    Arthralgia, unspecified joint    Leukocytosis, unspecified type    Other orders  -      atorvastatin (LIPITOR) 20 MG tablet; Take 1 tablet by mouth Daily.      Refilled her Lipitor.  Will reorder her labs to evaluate a Lymphocytosis on her CBC.  Return to the clinic in 3 months.  Will contact with results as needed.

## 2018-11-15 ENCOUNTER — TELEPHONE (OUTPATIENT)
Dept: FAMILY MEDICINE CLINIC | Facility: CLINIC | Age: 63
End: 2018-11-15

## 2018-11-15 DIAGNOSIS — D72.820 LYMPHOCYTOSIS: Primary | ICD-10-CM

## 2018-11-15 DIAGNOSIS — E06.3 HASHIMOTO'S DISEASE: ICD-10-CM

## 2018-11-15 DIAGNOSIS — E78.2 MIXED HYPERLIPIDEMIA: ICD-10-CM

## 2018-11-15 NOTE — TELEPHONE ENCOUNTER
She called and said she has an appt with Dr Garsia Nov 26 for 3 mo calvin and he wants her to have some lab work done before appt- needs orders sent to lab. Please call her back at [phone] so she will know when done.

## 2018-11-19 ENCOUNTER — LAB (OUTPATIENT)
Dept: LAB | Facility: HOSPITAL | Age: 63
End: 2018-11-19

## 2018-11-19 DIAGNOSIS — E78.2 MIXED HYPERLIPIDEMIA: ICD-10-CM

## 2018-11-19 DIAGNOSIS — D72.820 LYMPHOCYTOSIS: ICD-10-CM

## 2018-11-19 DIAGNOSIS — E06.3 HASHIMOTO'S DISEASE: ICD-10-CM

## 2018-11-19 LAB
ALBUMIN SERPL-MCNC: 4.4 G/DL (ref 3.4–4.8)
ALBUMIN/GLOB SERPL: 1.2 G/DL (ref 1.1–1.8)
ALP SERPL-CCNC: 127 U/L (ref 38–126)
ALT SERPL W P-5'-P-CCNC: 19 U/L (ref 9–52)
ANION GAP SERPL CALCULATED.3IONS-SCNC: 11 MMOL/L (ref 5–15)
AST SERPL-CCNC: 33 U/L (ref 14–36)
BILIRUB SERPL-MCNC: 0.6 MG/DL (ref 0.2–1.3)
BUN BLD-MCNC: 12 MG/DL (ref 7–21)
BUN/CREAT SERPL: 17.6 (ref 7–25)
CALCIUM SPEC-SCNC: 9.4 MG/DL (ref 8.4–10.2)
CHLORIDE SERPL-SCNC: 100 MMOL/L (ref 95–110)
CO2 SERPL-SCNC: 27 MMOL/L (ref 22–31)
CREAT BLD-MCNC: 0.68 MG/DL (ref 0.5–1)
DEPRECATED RDW RBC AUTO: 43.6 FL (ref 36.4–46.3)
EOSINOPHIL # BLD MANUAL: 0.05 10*3/MM3 (ref 0–0.7)
EOSINOPHIL NFR BLD MANUAL: 1 % (ref 0–7)
ERYTHROCYTE [DISTWIDTH] IN BLOOD BY AUTOMATED COUNT: 13.2 % (ref 11.5–14.5)
GFR SERPL CREATININE-BSD FRML MDRD: 106 ML/MIN/1.73 (ref 45–104)
GLOBULIN UR ELPH-MCNC: 3.6 GM/DL (ref 2.3–3.5)
GLUCOSE BLD-MCNC: 89 MG/DL (ref 60–100)
HCT VFR BLD AUTO: 38.9 % (ref 35–45)
HGB BLD-MCNC: 13 G/DL (ref 12–15.5)
LYMPHOCYTES # BLD MANUAL: 2.96 10*3/MM3 (ref 0.6–4.2)
LYMPHOCYTES NFR BLD MANUAL: 59 % (ref 10–50)
LYMPHOCYTES NFR BLD MANUAL: 9 % (ref 0–12)
MCH RBC QN AUTO: 30.2 PG (ref 26.5–34)
MCHC RBC AUTO-ENTMCNC: 33.4 G/DL (ref 31.4–36)
MCV RBC AUTO: 90.5 FL (ref 80–98)
MONOCYTES # BLD AUTO: 0.45 10*3/MM3 (ref 0–0.9)
NEUTROPHILS # BLD AUTO: 1.56 10*3/MM3 (ref 2–8.6)
NEUTROPHILS NFR BLD MANUAL: 31 % (ref 37–80)
PLATELET # BLD AUTO: 264 10*3/MM3 (ref 150–450)
PMV BLD AUTO: 11.1 FL (ref 8–12)
POTASSIUM BLD-SCNC: 3.9 MMOL/L (ref 3.5–5.1)
PROT SERPL-MCNC: 8 G/DL (ref 6.3–8.6)
RBC # BLD AUTO: 4.3 10*6/MM3 (ref 3.77–5.16)
RBC MORPH BLD: NORMAL
SMALL PLATELETS BLD QL SMEAR: ADEQUATE
SODIUM BLD-SCNC: 138 MMOL/L (ref 137–145)
WBC MORPH BLD: NORMAL
WBC NRBC COR # BLD: 5.02 10*3/MM3 (ref 3.2–9.8)

## 2018-11-19 PROCEDURE — 85025 COMPLETE CBC W/AUTO DIFF WBC: CPT

## 2018-11-19 PROCEDURE — 36415 COLL VENOUS BLD VENIPUNCTURE: CPT

## 2018-11-19 PROCEDURE — 85007 BL SMEAR W/DIFF WBC COUNT: CPT

## 2018-11-19 PROCEDURE — 80053 COMPREHEN METABOLIC PANEL: CPT

## 2018-11-26 ENCOUNTER — OFFICE VISIT (OUTPATIENT)
Dept: FAMILY MEDICINE CLINIC | Facility: CLINIC | Age: 63
End: 2018-11-26

## 2018-11-26 VITALS
DIASTOLIC BLOOD PRESSURE: 72 MMHG | SYSTOLIC BLOOD PRESSURE: 136 MMHG | HEIGHT: 65 IN | OXYGEN SATURATION: 97 % | HEART RATE: 68 BPM | BODY MASS INDEX: 43.49 KG/M2 | WEIGHT: 261.06 LBS

## 2018-11-26 DIAGNOSIS — R10.13 EPIGASTRIC PAIN: Primary | ICD-10-CM

## 2018-12-07 ENCOUNTER — OFFICE VISIT (OUTPATIENT)
Dept: FAMILY MEDICINE CLINIC | Facility: CLINIC | Age: 63
End: 2018-12-07

## 2018-12-07 VITALS
BODY MASS INDEX: 43.19 KG/M2 | DIASTOLIC BLOOD PRESSURE: 72 MMHG | WEIGHT: 259.25 LBS | HEART RATE: 63 BPM | OXYGEN SATURATION: 98 % | SYSTOLIC BLOOD PRESSURE: 144 MMHG | HEIGHT: 65 IN

## 2018-12-07 DIAGNOSIS — R12 HEARTBURN: ICD-10-CM

## 2018-12-07 DIAGNOSIS — Z23 NEEDS FLU SHOT: ICD-10-CM

## 2018-12-07 DIAGNOSIS — E78.2 MIXED HYPERLIPIDEMIA: ICD-10-CM

## 2018-12-07 DIAGNOSIS — Z12.39 SCREENING BREAST EXAMINATION: Primary | ICD-10-CM

## 2018-12-07 DIAGNOSIS — R79.89 LOW VITAMIN D LEVEL: ICD-10-CM

## 2018-12-07 PROCEDURE — 90471 IMMUNIZATION ADMIN: CPT | Performed by: FAMILY MEDICINE

## 2018-12-07 PROCEDURE — 90674 CCIIV4 VAC NO PRSV 0.5 ML IM: CPT | Performed by: FAMILY MEDICINE

## 2018-12-07 PROCEDURE — 99214 OFFICE O/P EST MOD 30 MIN: CPT | Performed by: FAMILY MEDICINE

## 2018-12-07 NOTE — PROGRESS NOTES
Subjective   Rosa M Ferguson is a 63 y.o. female.   Cc:follow up  History of Present Illness The patient comes in for check of their chronic medical issues which include Hypercholesterolemia,Hypothyroidism and G.E.R.D. She is doing well. .      The following portions of the patient's history were reviewed and updated as appropriate: allergies, current medications, past family history, past medical history, past social history, past surgical history and problem list.    Review of Systems   Constitutional: Negative for fatigue and fever.   Respiratory: Negative for cough, chest tightness and stridor.    Cardiovascular: Negative for chest pain, palpitations and leg swelling.       Objective   Physical Exam   Constitutional: She appears well-developed and well-nourished.   HENT:   Head: Normocephalic and atraumatic.   Right Ear: External ear normal.   Left Ear: External ear normal.   Nose: Nose normal.   Mouth/Throat: Oropharynx is clear and moist.   Eyes: Pupils are equal, round, and reactive to light.   Neck: Normal range of motion.   Cardiovascular: Normal rate, regular rhythm and normal heart sounds. Exam reveals no gallop and no friction rub.   No murmur heard.  Pulmonary/Chest: Effort normal and breath sounds normal. No stridor. No respiratory distress. She has no wheezes. She has no rales.   Abdominal: Soft. Bowel sounds are normal. She exhibits no distension. There is no tenderness. There is no guarding.   Skin: Skin is warm and dry.   Vitals reviewed.        Assessment/Plan   Rosa M was seen today for follow-up.    Diagnoses and all orders for this visit:    Screening breast examination  -     Mammo Screening Bilateral With CAD; Future    Mixed hyperlipidemia  -     Lipid panel; Future  -     Comprehensive metabolic panel; Future  -     CBC w AUTO Differential; Future    Heartburn    Low vitamin D level  -     Vitamin D 25 hydroxy; Future    Needs flu shot  -     influenza vac split quad  (FLUZONE,FLUARIX,AFLURIA) injection 0.5 mL; Inject 0.5 mL into the appropriate muscle as directed by prescriber During Hospitalization for Immunization.          Return to the clinic in 3 month/s.  Will contact with results as needed.

## 2018-12-11 DIAGNOSIS — Z12.39 SCREENING BREAST EXAMINATION: Primary | ICD-10-CM

## 2019-01-22 ENCOUNTER — APPOINTMENT (OUTPATIENT)
Dept: ULTRASOUND IMAGING | Facility: HOSPITAL | Age: 64
End: 2019-01-22

## 2019-02-04 ENCOUNTER — HOSPITAL ENCOUNTER (OUTPATIENT)
Dept: ULTRASOUND IMAGING | Facility: HOSPITAL | Age: 64
Discharge: HOME | End: 2019-02-04
Admitting: NURSE PRACTITIONER

## 2019-02-04 DIAGNOSIS — E04.1 NONTOXIC THYROID NODULE: ICD-10-CM

## 2019-02-04 PROCEDURE — 76536 US EXAM OF HEAD AND NECK: CPT

## 2019-02-05 RX ORDER — LEVOTHYROXINE SODIUM 0.1 MG/1
TABLET ORAL
Qty: 30 TABLET | Refills: 0 | Status: SHIPPED | OUTPATIENT
Start: 2019-02-05 | End: 2019-02-11 | Stop reason: SDUPTHER

## 2019-02-08 ENCOUNTER — LAB (OUTPATIENT)
Dept: LAB | Facility: HOSPITAL | Age: 64
End: 2019-02-08

## 2019-02-08 DIAGNOSIS — E55.9 VITAMIN D DEFICIENCY: ICD-10-CM

## 2019-02-08 DIAGNOSIS — R79.89 LOW VITAMIN D LEVEL: ICD-10-CM

## 2019-02-08 DIAGNOSIS — E06.3 HASHIMOTO'S DISEASE: ICD-10-CM

## 2019-02-08 DIAGNOSIS — E78.2 MIXED HYPERLIPIDEMIA: ICD-10-CM

## 2019-02-08 DIAGNOSIS — E78.2 MIXED HYPERLIPIDEMIA: Primary | ICD-10-CM

## 2019-02-08 LAB
25(OH)D3 SERPL-MCNC: 40 NG/ML (ref 30–100)
ALBUMIN SERPL-MCNC: 4.7 G/DL (ref 3.4–4.8)
ALBUMIN/GLOB SERPL: 1.2 G/DL (ref 1.1–1.8)
ALP SERPL-CCNC: 130 U/L (ref 38–126)
ALT SERPL W P-5'-P-CCNC: 27 U/L (ref 9–52)
ANION GAP SERPL CALCULATED.3IONS-SCNC: 10 MMOL/L (ref 5–15)
ARTICHOKE IGE QN: 116 MG/DL (ref 1–129)
AST SERPL-CCNC: 30 U/L (ref 14–36)
BILIRUB SERPL-MCNC: 0.6 MG/DL (ref 0.2–1.3)
BUN BLD-MCNC: 15 MG/DL (ref 7–21)
BUN/CREAT SERPL: 19.5 (ref 7–25)
CALCIUM SPEC-SCNC: 10.1 MG/DL (ref 8.4–10.2)
CHLORIDE SERPL-SCNC: 102 MMOL/L (ref 95–110)
CHOLEST SERPL-MCNC: 245 MG/DL (ref 0–199)
CO2 SERPL-SCNC: 27 MMOL/L (ref 22–31)
CREAT BLD-MCNC: 0.77 MG/DL (ref 0.5–1)
DEPRECATED RDW RBC AUTO: 46.6 FL (ref 36.4–46.3)
EOSINOPHIL # BLD MANUAL: 0.29 10*3/MM3 (ref 0–0.7)
EOSINOPHIL NFR BLD MANUAL: 7 % (ref 0–7)
ERYTHROCYTE [DISTWIDTH] IN BLOOD BY AUTOMATED COUNT: 13.6 % (ref 11.5–14.5)
GFR SERPL CREATININE-BSD FRML MDRD: 92 ML/MIN/1.73 (ref 45–104)
GLOBULIN UR ELPH-MCNC: 3.8 GM/DL (ref 2.3–3.5)
GLUCOSE BLD-MCNC: 90 MG/DL (ref 60–100)
HCT VFR BLD AUTO: 41.4 % (ref 35–45)
HDLC SERPL-MCNC: 66 MG/DL (ref 60–200)
HGB BLD-MCNC: 13.6 G/DL (ref 12–15.5)
LDLC/HDLC SERPL: 2.47 {RATIO} (ref 0–3.22)
LYMPHOCYTES # BLD MANUAL: 2.18 10*3/MM3 (ref 0.6–4.2)
LYMPHOCYTES NFR BLD MANUAL: 52 % (ref 10–50)
LYMPHOCYTES NFR BLD MANUAL: 8 % (ref 0–12)
MCH RBC QN AUTO: 30.4 PG (ref 26.5–34)
MCHC RBC AUTO-ENTMCNC: 32.9 G/DL (ref 31.4–36)
MCV RBC AUTO: 92.6 FL (ref 80–98)
MONOCYTES # BLD AUTO: 0.34 10*3/MM3 (ref 0–0.9)
NEUTROPHILS # BLD AUTO: 1.34 10*3/MM3 (ref 2–8.6)
NEUTROPHILS NFR BLD MANUAL: 32 % (ref 37–80)
PLATELET # BLD AUTO: 253 10*3/MM3 (ref 150–450)
PMV BLD AUTO: 11.6 FL (ref 8–12)
POTASSIUM BLD-SCNC: 4.1 MMOL/L (ref 3.5–5.1)
PROT SERPL-MCNC: 8.5 G/DL (ref 6.3–8.6)
RBC # BLD AUTO: 4.47 10*6/MM3 (ref 3.77–5.16)
RBC MORPH BLD: NORMAL
SMALL PLATELETS BLD QL SMEAR: ADEQUATE
SODIUM BLD-SCNC: 139 MMOL/L (ref 137–145)
TRIGL SERPL-MCNC: 81 MG/DL (ref 20–199)
TSH SERPL DL<=0.05 MIU/L-ACNC: 1.45 MIU/ML (ref 0.46–4.68)
VARIANT LYMPHS NFR BLD MANUAL: 1 % (ref 0–5)
VIT B12 BLD-MCNC: 460 PG/ML (ref 239–931)
WBC MORPH BLD: NORMAL
WBC NRBC COR # BLD: 4.19 10*3/MM3 (ref 3.2–9.8)

## 2019-02-08 PROCEDURE — 82607 VITAMIN B-12: CPT | Performed by: NURSE PRACTITIONER

## 2019-02-08 PROCEDURE — 82306 VITAMIN D 25 HYDROXY: CPT

## 2019-02-08 PROCEDURE — 84443 ASSAY THYROID STIM HORMONE: CPT | Performed by: NURSE PRACTITIONER

## 2019-02-08 PROCEDURE — 80053 COMPREHEN METABOLIC PANEL: CPT

## 2019-02-08 PROCEDURE — 85025 COMPLETE CBC W/AUTO DIFF WBC: CPT

## 2019-02-08 PROCEDURE — 80061 LIPID PANEL: CPT

## 2019-02-08 PROCEDURE — 36415 COLL VENOUS BLD VENIPUNCTURE: CPT | Performed by: NURSE PRACTITIONER

## 2019-02-08 PROCEDURE — 85007 BL SMEAR W/DIFF WBC COUNT: CPT

## 2019-02-11 ENCOUNTER — OFFICE VISIT (OUTPATIENT)
Dept: ENDOCRINOLOGY | Facility: CLINIC | Age: 64
End: 2019-02-11

## 2019-02-11 VITALS
WEIGHT: 266 LBS | HEART RATE: 69 BPM | SYSTOLIC BLOOD PRESSURE: 158 MMHG | HEIGHT: 65 IN | BODY MASS INDEX: 44.32 KG/M2 | DIASTOLIC BLOOD PRESSURE: 80 MMHG

## 2019-02-11 DIAGNOSIS — E06.3 HASHIMOTO'S DISEASE: ICD-10-CM

## 2019-02-11 DIAGNOSIS — E04.1 NONTOXIC THYROID NODULE: Primary | ICD-10-CM

## 2019-02-11 DIAGNOSIS — E78.2 MIXED HYPERLIPIDEMIA: ICD-10-CM

## 2019-02-11 DIAGNOSIS — E55.9 VITAMIN D DEFICIENCY: ICD-10-CM

## 2019-02-11 PROCEDURE — 99214 OFFICE O/P EST MOD 30 MIN: CPT | Performed by: NURSE PRACTITIONER

## 2019-02-11 RX ORDER — ATORVASTATIN CALCIUM 20 MG/1
20 TABLET, FILM COATED ORAL DAILY
Qty: 90 TABLET | Refills: 1 | Status: SHIPPED | OUTPATIENT
Start: 2019-02-11 | End: 2019-08-12 | Stop reason: SDUPTHER

## 2019-02-11 RX ORDER — LEVOTHYROXINE SODIUM 0.1 MG/1
100 TABLET ORAL DAILY
Qty: 90 TABLET | Refills: 3 | Status: SHIPPED | OUTPATIENT
Start: 2019-02-11 | End: 2019-09-13 | Stop reason: SDUPTHER

## 2019-02-11 NOTE — PROGRESS NOTES
Subjective    Rosa M Ferguson is a 64 y.o. female. she is here today for follow-up.    History of Present Illness       History of Present Illness           63 year old female comes for follow up of hypothyroidism diagnosed in 1987.     She was on  brandname synthroid 150 mcgs daily that she takes every morning on an empty stomach.         since last appt has lost 3 lbs     In addition she has noticed visible goiter but US is stable     She denies eye pain,  red eye, diplopia, of eyelid edema.     since last appt changed to armour thyroid and states she does feels some better        In regards to bone health, she doesn't drink milk . She occasionally eats yogurt.         She has vitamin D Deficiency but hasn't been taking it consistently      She has dyslipidemia on pravachol --changed to atorovastatin--she does not remember to take everynight                    Evaluation history:  TSH   Date Value Ref Range Status   02/08/2019 1.450 0.460 - 4.680 mIU/mL Final     Free T4   Date Value Ref Range Status   01/11/2018 1.55 0.78 - 2.19 ng/dL Final       Current medications:  Current Outpatient Medications   Medication Sig Dispense Refill   • atorvastatin (LIPITOR) 20 MG tablet Take 1 tablet by mouth Daily. 90 tablet 1   • levothyroxine (SYNTHROID, LEVOTHROID) 100 MCG tablet TAKE 1 TABLET BY MOUTH EVERY DAY 30 tablet 0   • pantoprazole (PROTONIX) 40 MG EC tablet Take 1 tablet by mouth Daily. 30 tablet 11   • vitamin D (ERGOCALCIFEROL) 32499 units capsule capsule TAKE 1 CAPSULE BY MOUTH EVERY 7 DAYS 12 capsule 0     No current facility-administered medications for this visit.        The following portions of the patient's history were reviewed and updated as appropriate:   Past Medical History:   Diagnosis Date   • Acquired hypothyroidism    • Astigmatism    • Contact dermatitis due to plants    • Dyslipidemia    • Elevated blood pressure reading without diagnosis of hypertension    • Generalized anxiety disorder    •  Goiter    • Hashimoto's thyroiditis    • History of varicose veins    • Hypothyroidism    • Multinodular goiter    • Myopia    • Obesity    • Pain in eye     etiol unknown      • Plantar fasciitis    • Rash    • Thyroid nodule    • Upper respiratory infection    • Urinary tract infectious disease    • Vitamin D deficiency      Past Surgical History:   Procedure Laterality Date   • BLADDER SUSPENSION     • COLONOSCOPY N/A 3/6/2017    Procedure: COLONOSCOPY;  Surgeon: Edis Schaffer MD;  Location: [facility] ENDOSCOPY;  Service:    • CYSTOSCOPY  1999    Cystoscopy and left retrograde study. Stone passed with uretherocele.   • ENDOSCOPY N/A 10/9/2017    Procedure: ESOPHAGOGASTRODUODENOSCOPY possible dilation ;  Surgeon: Edis Schaffer MD;  Location: [facility] ENDOSCOPY;  Service:    • INJECTION OF MEDICATION  2016    Kenalog (1)     • TUBAL ABDOMINAL LIGATION  1992    Bilateral partial salpingectomy. Desires sterilization.     Family History   Problem Relation Age of Onset   • Breast cancer Other    • Cancer Other    • Coronary artery disease Other    • Diabetes Other    • Cancer Mother    • Heart disease Father    • Stroke Paternal Uncle    • Breast cancer Maternal Aunt      OB History      Para Term  AB Living    3 3 3          SAB TAB Ectopic Molar Multiple Live Births                       No Known Allergies  Social History     Socioeconomic History   • Marital status: Single     Spouse name: Not on file   • Number of children: Not on file   • Years of education: Not on file   • Highest education level: Not on file   Tobacco Use   • Smoking status: Never Smoker   • Smokeless tobacco: Never Used   Substance and Sexual Activity   • Alcohol use: Yes     Comment: socially   • Drug use: No   • Sexual activity: Defer       Review of Systems  Review of Systems   Constitutional: Negative for activity change, appetite change, diaphoresis and fatigue.   HENT: Negative for facial swelling,  "sneezing, sore throat, tinnitus, trouble swallowing and voice change.    Eyes: Negative for photophobia, pain, discharge, redness, itching and visual disturbance.   Respiratory: Negative for apnea, cough, choking, chest tightness and shortness of breath.    Cardiovascular: Negative for chest pain, palpitations and leg swelling.   Gastrointestinal: Negative for abdominal distention, abdominal pain, constipation, diarrhea, nausea and vomiting.   Endocrine: Negative for cold intolerance, heat intolerance, polydipsia, polyphagia and polyuria.   Genitourinary: Negative for difficulty urinating, dysuria, frequency, hematuria and urgency.   Musculoskeletal: Negative for arthralgias, back pain, gait problem, joint swelling, myalgias, neck pain and neck stiffness.   Skin: Negative for color change, pallor, rash and wound.   Neurological: Negative for dizziness, tremors, weakness, light-headedness, numbness and headaches.   Hematological: Negative for adenopathy. Does not bruise/bleed easily.   Psychiatric/Behavioral: Negative for behavioral problems, confusion and sleep disturbance.        Objective    /80 (BP Location: Left arm, Patient Position: Sitting, Cuff Size: Adult)   Pulse 69   Ht 165.1 cm (65\")   Wt 121 kg (266 lb)   LMP 08/22/2008 (Within Months)   BMI 44.26 kg/m²   Physical Exam   Constitutional: She is oriented to person, place, and time. She appears well-developed and well-nourished. No distress.   HENT:   Head: Normocephalic and atraumatic.   Right Ear: External ear normal.   Left Ear: External ear normal.   Nose: Nose normal.   Eyes: Conjunctivae and EOM are normal. Pupils are equal, round, and reactive to light.   Neck: Normal range of motion. Neck supple. No tracheal deviation present. Thyromegaly present.   30 gm gland   Cardiovascular: Normal rate, regular rhythm and normal heart sounds.   No murmur heard.  Pulmonary/Chest: Effort normal and breath sounds normal. No respiratory distress. She " has no wheezes.   Abdominal: Soft. Bowel sounds are normal. There is no tenderness. There is no rebound and no guarding.   Musculoskeletal: Normal range of motion. She exhibits no edema, tenderness or deformity.   Neurological: She is alert and oriented to person, place, and time. No cranial nerve deficit.   Skin: Skin is warm and dry. No rash noted.   Psychiatric: She has a normal mood and affect. Her behavior is normal. Judgment and thought content normal.       Lab Review  Lab Results   Component Value Date    TSH 1.450 02/08/2019     Lab Results   Component Value Date    FREET4 1.55 01/11/2018        Assessment/Plan      1. Nontoxic thyroid nodule    2. Hashimoto's disease    3. Vitamin D deficiency    . This diagnosis was discussed and reviewed with the patient including the advantages of drug therapy.     1. Orders placed during this encounter include:  No orders of the defined types were placed in this encounter.      Medications prescribed:  Outpatient Encounter Medications as of 2/11/2019   Medication Sig Dispense Refill   • levothyroxine (SYNTHROID, LEVOTHROID) 100 MCG tablet TAKE 1 TABLET BY MOUTH EVERY DAY 30 tablet 0   • pantoprazole (PROTONIX) 40 MG EC tablet Take 1 tablet by mouth Daily. 30 tablet 11   • vitamin D (ERGOCALCIFEROL) 03699 units capsule capsule TAKE 1 CAPSULE BY MOUTH EVERY 7 DAYS 12 capsule 0   • [DISCONTINUED] atorvastatin (LIPITOR) 20 MG tablet Take 1 tablet by mouth Daily. 90 tablet 1     No facility-administered encounter medications on file as of 2/11/2019.      Plan Details  Hypothyroidism                Levothyroxine 100 mcg one daily                    Lab Results   Component Value Date    TSH 1.450 02/08/2019        --------------------------------------------------------------        Nontoxic multinodular goiter      2012  barium swallow and PFT to evaluate for extrinsic compression were normal     Her Thyroid US from Sept 2011 was personally reviewed ( goiter with  subcentimeter nodules ) - repeat for Oct 2013 enlarged gland , pseudonodules, left inf cyst of 1.3 cms , no need for FNA--will need repeat U/S -- -     oct. 2015     thyroid u/s      stable , questionable area in the left inferior lobe               June 2016     Reviewed by Dr. Munoz the left 1.8 cm cystic thyroid nodule did measure 1.5 cm --appears low risk for malignancy repeat in 6 months - scheduled for Jan. 13, 2017--biopsy consistent with underactive thyroid                     Notes Recorded by Harry Person MD on 2/1/2017 at 11:38 PM  Please call patient with results. Let her know that the biopsy was consistent with a diagnosis of underactive thyroid, no malignancy. Her thyroid levels are normal so I suggest we don't change her dose.   Note for Me : FNA states insufficient but her US features are typical of Hashimoto's and the cytology shows lymphocytic infiltration as expected in Hashimoto's so I will not consider it insufficient. There is no need for repeat FNA of the lesion that I targeted since it would only show lymphocytic infiltration. Images clearly document adequate needle placement so mis sampling didn't occur. Pathology should distinguish with expected findings in hashimoto;s vs nondiagnostic.                        Repeat ultrasound Jan. 2018     No changes - stable exam         THYROID ULTRASOUND     CLINICAL INFORMATION:  Nontoxic multinodular goiter.     TECHNIQUE:  Sagittal and axial images of the thyroid are  obtained.     COMPARISON:  January 24, 2018.     FINDINGS:  Thyroid size:  Right lobe 5.5 x 2.3 x 2.5 cm, enlarged. The right  lobe is heterogenous but no evidence of any focal nodules.     Left lobe 6.1 x 2.8 x 2.7 cm, enlarged. Two discrete nodules  lower pole left lobe one is 1.4 x 1.1 x 1.4 cm. The second is 1.1  x 1.0 x 1.0 cm. Both nodules are well-circumscribed solid and  hypoechoic.  No significant changes since prior examination allowing for  differences in  technique.     IMPRESSION:  CONCLUSION: Bilateral thyroid lobe enlargement. Left lobe thyroid  nodules unchanged since prior exam. Recommend follow-up  examination one years time.     Electronically signed by:  Isai Nguyen MD  2/4/2019 2:52 PM CST  Workstation: [id number]   Imaging       Repeat one year         -----------------------------------------------------     Vid D def -            change to OTC 2000 units daily     July 2017     Vitamin d - 36     Continue vitamin d           Component      Latest Ref Rng & Units 2/8/2019   25 Hydroxy, Vitamin D      30.0 - 100.0 ng/ml 40.0          Lab Results   Component Value Date    [id number] 460 02/08/2019       ------------------------------------------------------------------------------------     Dyslipidemia              Component      Latest Ref Rng & Units 2/8/2019   Total Cholesterol      0 - 199 mg/dL 245 (H)   Triglycerides      20 - 199 mg/dL 81   HDL Cholesterol      60 - 200 mg/dL 66   LDL Cholesterol       1 - 129 mg/dL 116   LDL/HDL Ratio      0.00 - 3.22 2.47              She is not taking the Lipitor worried about possible side effects agrees to take the 20 mg      -------------------      4. No Follow-up on file.

## 2019-03-07 ENCOUNTER — OFFICE VISIT (OUTPATIENT)
Dept: FAMILY MEDICINE CLINIC | Facility: CLINIC | Age: 64
End: 2019-03-07

## 2019-03-07 VITALS
DIASTOLIC BLOOD PRESSURE: 78 MMHG | HEART RATE: 58 BPM | OXYGEN SATURATION: 100 % | WEIGHT: 266 LBS | BODY MASS INDEX: 44.32 KG/M2 | SYSTOLIC BLOOD PRESSURE: 138 MMHG | HEIGHT: 65 IN

## 2019-03-07 DIAGNOSIS — E78.2 MIXED HYPERLIPIDEMIA: Primary | ICD-10-CM

## 2019-03-07 DIAGNOSIS — E03.9 HYPOTHYROIDISM, UNSPECIFIED TYPE: ICD-10-CM

## 2019-03-07 DIAGNOSIS — E55.9 VITAMIN D DEFICIENCY: ICD-10-CM

## 2019-03-07 PROCEDURE — 99214 OFFICE O/P EST MOD 30 MIN: CPT | Performed by: FAMILY MEDICINE

## 2019-03-07 NOTE — PROGRESS NOTES
Subjective   Rosa M Ferguson is a 64 y.o. female.    cc: follow up  History of Present Illness The patient comes in for check of their chronic medical issues which include Hyperlipidemia,Hypothyroidism and GERD. She is at her base line..      The following portions of the patient's history were reviewed and updated as appropriate: allergies, current medications, past family history, past medical history, past social history, past surgical history and problem list.    Review of Systems   Constitutional: Negative for fatigue and fever.   Respiratory: Negative for cough, chest tightness and stridor.    Cardiovascular: Negative for chest pain, palpitations and leg swelling.   Musculoskeletal: Positive for back pain. Negative for arthralgias.       Objective   Physical Exam   Constitutional: She appears well-developed and well-nourished.   HENT:   Head: Normocephalic and atraumatic.   Right Ear: External ear normal.   Left Ear: External ear normal.   Nose: Nose normal.   Mouth/Throat: Oropharynx is clear and moist.   Eyes: Pupils are equal, round, and reactive to light.   Neck: Normal range of motion.   Cardiovascular: Normal rate, regular rhythm and normal heart sounds. Exam reveals no gallop and no friction rub.   No murmur heard.  Pulmonary/Chest: Effort normal and breath sounds normal. No stridor. No respiratory distress. She has no wheezes. She has no rales.   Abdominal: Soft. Bowel sounds are normal. She exhibits no distension. There is no tenderness. There is no guarding.   Neurological: She is alert.   Skin: Skin is warm and dry.   Vitals reviewed.        Assessment/Plan   Rosa M was seen today for follow-up.    Diagnoses and all orders for this visit:    Mixed hyperlipidemia    Vitamin D deficiency    Hypothyroidism, unspecified type    Return to the clinic in 3 month/s.  Will contact with results as needed.

## 2019-04-04 RX ORDER — ERGOCALCIFEROL 1.25 MG/1
CAPSULE ORAL
Qty: 12 CAPSULE | Refills: 0 | Status: SHIPPED | OUTPATIENT
Start: 2019-04-04 | End: 2019-07-04 | Stop reason: SDUPTHER

## 2019-05-21 ENCOUNTER — OFFICE VISIT (OUTPATIENT)
Dept: GASTROENTEROLOGY | Facility: CLINIC | Age: 64
End: 2019-05-21

## 2019-05-21 VITALS
SYSTOLIC BLOOD PRESSURE: 138 MMHG | WEIGHT: 266.8 LBS | DIASTOLIC BLOOD PRESSURE: 44 MMHG | BODY MASS INDEX: 44.45 KG/M2 | HEART RATE: 62 BPM | HEIGHT: 65 IN

## 2019-05-21 DIAGNOSIS — K21.00 GASTROESOPHAGEAL REFLUX DISEASE WITH ESOPHAGITIS: Primary | ICD-10-CM

## 2019-05-21 DIAGNOSIS — K59.04 CHRONIC IDIOPATHIC CONSTIPATION: ICD-10-CM

## 2019-05-21 PROCEDURE — 99214 OFFICE O/P EST MOD 30 MIN: CPT | Performed by: NURSE PRACTITIONER

## 2019-05-21 RX ORDER — PANTOPRAZOLE SODIUM 40 MG/1
40 TABLET, DELAYED RELEASE ORAL DAILY
Qty: 30 TABLET | Refills: 11 | Status: SHIPPED | OUTPATIENT
Start: 2019-05-21 | End: 2019-12-06 | Stop reason: SDUPTHER

## 2019-05-21 NOTE — PATIENT INSTRUCTIONS
Constipation, Adult  Constipation is when a person has fewer bowel movements in a week than normal, has difficulty having a bowel movement, or has stools that are dry, hard, or larger than normal. Constipation may be caused by an underlying condition. It may become worse with age if a person takes certain medicines and does not take in enough fluids.  Follow these instructions at home:  Eating and drinking    · Eat foods that have a lot of fiber, such as fresh fruits and vegetables, whole grains, and beans.  · Limit foods that are high in fat, low in fiber, or overly processed, such as french fries, hamburgers, cookies, candies, and soda.  · Drink enough fluid to keep your urine clear or pale yellow.  General instructions  · Exercise regularly or as told by your health care provider.  · Go to the restroom when you have the urge to go. Do not hold it in.  · Take over-the-counter and prescription medicines only as told by your health care provider. These include any fiber supplements.  · Practice pelvic floor retraining exercises, such as deep breathing while relaxing the lower abdomen and pelvic floor relaxation during bowel movements.  · Watch your condition for any changes.  · Keep all follow-up visits as told by your health care provider. This is important.  Contact a health care provider if:  · You have pain that gets worse.  · You have a fever.  · You do not have a bowel movement after 4 days.  · You vomit.  · You are not hungry.  · You lose weight.  · You are bleeding from the anus.  · You have thin, pencil-like stools.  Get help right away if:  · You have a fever and your symptoms suddenly get worse.  · You leak stool or have blood in your stool.  · Your abdomen is bloated.  · You have severe pain in your abdomen.  · You feel dizzy or you faint.  This information is not intended to replace advice given to you by your health care provider. Make sure you discuss any questions you have with your health care  provider.  Document Released: 09/15/2005 Document Revised: 07/07/2017 Document Reviewed: 06/07/2017  [employer] Patient Education © 2019 [employer]

## 2019-05-21 NOTE — PROGRESS NOTES
Chief Complaint   Patient presents with   • Constipation       Subjective    Rosa M Ferguson is a 64 y.o. female. she is here today for follow-up.    Constipation   This is a chronic problem. The current episode started more than 1 year ago. The problem has been waxing and waning since onset. Her stool frequency is 4 to 5 times per week (about 2-3 days between bowel movements ). The patient is on a high fiber diet. She exercises regularly. There has been adequate water intake. Associated symptoms include bloating. Pertinent negatives include no abdominal pain, anorexia, back pain, diarrhea, difficulty urinating, fever, nausea, rectal pain or vomiting. She has tried fiber and stool softeners for the symptoms.   Heartburn   She complains of tooth decay (bad taste in mouth if misses PPI ). She reports no abdominal pain, no belching, no chest pain, no choking, no coughing, no dysphagia, no early satiety, no globus sensation, no heartburn, no nausea or no sore throat. This is a chronic problem. The current episode started more than 1 year ago. The problem occurs rarely. The problem has been resolved. Pertinent negatives include no fatigue. Risk factors include obesity and lack of exercise. The treatment provided significant relief. Past procedures include an EGD.   Patient reports he has been doing very well over the last year has tried to drink increased amount of water daily and increase her dietary fiber to about 20 g/day.  Denies any symptoms of heartburn as long as she takes her medication.  Reports she has intermittent nausea if she misses medication along with a bad taste in her mouth.  Plan; she will try to decrease medication to every other day and discontinue if possible if symptoms become severe she will resume medication follow-up in this office in 1 year return to GI office sooner if needed         The following portions of the patient's history were reviewed and updated as appropriate:   Past Medical  History:   Diagnosis Date   • Acquired hypothyroidism    • Astigmatism    • Contact dermatitis due to plants    • Dyslipidemia    • Elevated blood pressure reading without diagnosis of hypertension    • Generalized anxiety disorder    • Goiter    • Hashimoto's thyroiditis    • History of varicose veins    • Hypothyroidism    • Multinodular goiter    • Myopia    • Obesity    • Pain in eye     etiol unknown      • Plantar fasciitis    • Rash    • Thyroid nodule    • Upper respiratory infection    • Urinary tract infectious disease    • Vitamin D deficiency      Past Surgical History:   Procedure Laterality Date   • BLADDER SUSPENSION     • COLONOSCOPY N/A 3/6/2017    Procedure: COLONOSCOPY;  Surgeon: Edis Schaffer MD;  Location: [facility] ENDOSCOPY;  Service:    • CYSTOSCOPY  1999    Cystoscopy and left retrograde study. Stone passed with uretherocele.   • ENDOSCOPY N/A 10/9/2017    Procedure: ESOPHAGOGASTRODUODENOSCOPY possible dilation ;  Surgeon: Edis Schaffer MD;  Location: [facility] ENDOSCOPY;  Service:    • INJECTION OF MEDICATION  2016    Kenalog (1)     • TUBAL ABDOMINAL LIGATION  1992    Bilateral partial salpingectomy. Desires sterilization.     Family History   Problem Relation Age of Onset   • Breast cancer Other    • Cancer Other    • Coronary artery disease Other    • Diabetes Other    • Cancer Mother    • Heart disease Father    • Stroke Paternal Uncle    • Breast cancer Maternal Aunt      OB History      Para Term  AB Living    3 3 3          SAB TAB Ectopic Molar Multiple Live Births                       Prior to Admission medications    Medication Sig Start Date End Date Taking? Authorizing Provider   atorvastatin (LIPITOR) 20 MG tablet Take 1 tablet by mouth Daily. 19  Yes Rock Garsia MD   levothyroxine (SYNTHROID, LEVOTHROID) 100 MCG tablet Take 1 tablet by mouth Daily. 19  Yes Dustin Eckert APRN   pantoprazole (PROTONIX) 40 MG EC  "tablet Take 1 tablet by mouth Daily. 5/21/18  Yes Jennifer Crook APRN   vitamin D (ERGOCALCIFEROL) 07489 units capsule capsule TAKE 1 CAPSULE BY MOUTH EVERY 7 DAYS 4/4/19  Yes Dustin Eckert APRN     No Known Allergies  Social History     Socioeconomic History   • Marital status: Single     Spouse name: Not on file   • Number of children: Not on file   • Years of education: Not on file   • Highest education level: Not on file   Tobacco Use   • Smoking status: Never Smoker   • Smokeless tobacco: Never Used   Substance and Sexual Activity   • Alcohol use: Yes     Comment: socially   • Drug use: No   • Sexual activity: Defer       Review of Systems  Review of Systems   Constitutional: Negative for activity change, appetite change, chills, diaphoresis, fatigue, fever and unexpected weight change.   HENT: Negative for sore throat and trouble swallowing.    Respiratory: Negative for cough, choking and shortness of breath.    Cardiovascular: Negative for chest pain.   Gastrointestinal: Positive for bloating and constipation. Negative for abdominal distention, abdominal pain, anal bleeding, anorexia, blood in stool, diarrhea, dysphagia, heartburn, nausea, rectal pain and vomiting.   Genitourinary: Negative for difficulty urinating.   Musculoskeletal: Negative for arthralgias and back pain.   Skin: Negative for pallor.   Neurological: Negative for light-headedness.        /44 (BP Location: Left arm)   Pulse 62   Ht 165.1 cm (65\")   Wt 121 kg (266 lb 12.8 oz)   LMP 08/22/2008 (Within Months)   BMI 44.40 kg/m²     Objective    Physical Exam   Constitutional: She is oriented to person, place, and time. She appears well-developed and well-nourished. She is cooperative. No distress.   HENT:   Head: Normocephalic and atraumatic.   Neck: Normal range of motion. Neck supple. No thyromegaly present.   Cardiovascular: Normal rate, regular rhythm and normal heart sounds.   Pulmonary/Chest: Effort normal and breath " sounds normal. She has no wheezes. She has no rhonchi. She has no rales.   Abdominal: Soft. Normal appearance and bowel sounds are normal. She exhibits no shifting dullness, no distension, no fluid wave and no ascites. There is no hepatosplenomegaly. There is no tenderness. There is no rigidity and no guarding. No hernia.   Lymphadenopathy:     She has no cervical adenopathy.   Neurological: She is alert and oriented to person, place, and time.   Skin: Skin is warm, dry and intact. No rash noted. No pallor.   Psychiatric: She has a normal mood and affect. Her speech is normal.     Orders Only on 02/08/2019   Component Date Value Ref Range Status   • TSH 02/08/2019 1.450  0.460 - 4.680 mIU/mL Final   • Vitamin B-12 02/08/2019 460  239 - 931 pg/mL Final     Assessment/Plan      1. Gastroesophageal reflux disease with esophagitis    2. Chronic idiopathic constipation    .       Orders placed during this encounter include:  No orders of the defined types were placed in this encounter.      * Surgery not found *    Review and/or summary of lab tests, radiology, procedures, medications. Review and summary of old records and obtaining of history. The risks and benefits of my recommendations, as well as other treatment options were discussed with the patient today. Questions were answered.    New Medications Ordered This Visit   Medications   • pantoprazole (PROTONIX) 40 MG EC tablet     Sig: Take 1 tablet by mouth Daily.     Dispense:  30 tablet     Refill:  11       Follow-up: Return in about 1 year (around 5/21/2020).          This document has been electronically signed by RACHEL Lowry on May 21, 2019 10:30 AM             Results for orders placed or performed in visit on 02/08/19   TSH   Result Value Ref Range    TSH 1.450 0.460 - 4.680 mIU/mL   Vitamin B12   Result Value Ref Range    Vitamin B-12 460 239 - 931 pg/mL   Results for orders placed or performed in visit on 02/08/19   CBC Auto Differential   Result  Value Ref Range    WBC 4.19 3.20 - 9.80 10*3/mm3    RBC 4.47 3.77 - 5.16 10*6/mm3    Hemoglobin 13.6 12.0 - 15.5 g/dL    Hematocrit 41.4 35.0 - 45.0 %    MCV 92.6 80.0 - 98.0 fL    MCH 30.4 26.5 - 34.0 pg    MCHC 32.9 31.4 - 36.0 g/dL    RDW 13.6 11.5 - 14.5 %    RDW-SD 46.6 (H) 36.4 - 46.3 fl    MPV 11.6 8.0 - 12.0 fL    Platelets 253 150 - 450 10*3/mm3   Vitamin D 25 hydroxy   Result Value Ref Range    25 Hydroxy, Vitamin D 40.0 30.0 - 100.0 ng/ml   Manual Differential   Result Value Ref Range    Neutrophil % 32.0 (L) 37.0 - 80.0 %    Lymphocyte % 52.0 (H) 10.0 - 50.0 %    Monocyte % 8.0 0.0 - 12.0 %    Eosinophil % 7.0 0.0 - 7.0 %    Atypical Lymphocyte % 1.0 0.0 - 5.0 %    Neutrophils Absolute 1.34 (L) 2.00 - 8.60 10*3/mm3    Lymphocytes Absolute 2.18 0.60 - 4.20 10*3/mm3    Monocytes Absolute 0.34 0.00 - 0.90 10*3/mm3    Eosinophils Absolute 0.29 0.00 - 0.70 10*3/mm3    RBC Morphology Normal Normal    WBC Morphology Normal Normal    Platelet Estimate Adequate Normal   Lipid panel   Result Value Ref Range    Total Cholesterol 245 (H) 0 - 199 mg/dL    Triglycerides 81 20 - 199 mg/dL    HDL Cholesterol 66 60 - 200 mg/dL    LDL Cholesterol  116 1 - 129 mg/dL    LDL/HDL Ratio 2.47 0.00 - 3.22   Comprehensive metabolic panel   Result Value Ref Range    Glucose 90 60 - 100 mg/dL    BUN 15 7 - 21 mg/dL    Creatinine 0.77 0.50 - 1.00 mg/dL    Sodium 139 137 - 145 mmol/L    Potassium 4.1 3.5 - 5.1 mmol/L    Chloride 102 95 - 110 mmol/L    CO2 27.0 22.0 - 31.0 mmol/L    Calcium 10.1 8.4 - 10.2 mg/dL    Total Protein 8.5 6.3 - 8.6 g/dL    Albumin 4.70 3.40 - 4.80 g/dL    ALT (SGPT) 27 9 - 52 U/L    AST (SGOT) 30 14 - 36 U/L    Alkaline Phosphatase 130 (H) 38 - 126 U/L    Total Bilirubin 0.6 0.2 - 1.3 mg/dL    eGFR   Amer 92 45 - 104 mL/min/1.73    Globulin 3.8 (H) 2.3 - 3.5 gm/dL    A/G Ratio 1.2 1.1 - 1.8 g/dL    BUN/Creatinine Ratio 19.5 7.0 - 25.0    Anion Gap 10.0 5.0 - 15.0 mmol/L   Results for orders placed or  performed in visit on 11/19/18   CBC Auto Differential   Result Value Ref Range    WBC 5.02 3.20 - 9.80 10*3/mm3    RBC 4.30 3.77 - 5.16 10*6/mm3    Hemoglobin 13.0 12.0 - 15.5 g/dL    Hematocrit 38.9 35.0 - 45.0 %    MCV 90.5 80.0 - 98.0 fL    MCH 30.2 26.5 - 34.0 pg    MCHC 33.4 31.4 - 36.0 g/dL    RDW 13.2 11.5 - 14.5 %    RDW-SD 43.6 36.4 - 46.3 fl    MPV 11.1 8.0 - 12.0 fL    Platelets 264 150 - 450 10*3/mm3   Manual Differential   Result Value Ref Range    Neutrophil % 31.0 (L) 37.0 - 80.0 %    Lymphocyte % 59.0 (H) 10.0 - 50.0 %    Monocyte % 9.0 0.0 - 12.0 %    Eosinophil % 1.0 0.0 - 7.0 %    Neutrophils Absolute 1.56 (L) 2.00 - 8.60 10*3/mm3    Lymphocytes Absolute 2.96 0.60 - 4.20 10*3/mm3    Monocytes Absolute 0.45 0.00 - 0.90 10*3/mm3    Eosinophils Absolute 0.05 0.00 - 0.70 10*3/mm3    RBC Morphology Normal Normal    WBC Morphology Normal Normal    Platelet Estimate Adequate Normal   Comprehensive Metabolic Panel   Result Value Ref Range    Glucose 89 60 - 100 mg/dL    BUN 12 7 - 21 mg/dL    Creatinine 0.68 0.50 - 1.00 mg/dL    Sodium 138 137 - 145 mmol/L    Potassium 3.9 3.5 - 5.1 mmol/L    Chloride 100 95 - 110 mmol/L    CO2 27.0 22.0 - 31.0 mmol/L    Calcium 9.4 8.4 - 10.2 mg/dL    Total Protein 8.0 6.3 - 8.6 g/dL    Albumin 4.40 3.40 - 4.80 g/dL    ALT (SGPT) 19 9 - 52 U/L    AST (SGOT) 33 14 - 36 U/L    Alkaline Phosphatase 127 (H) 38 - 126 U/L    Total Bilirubin 0.6 0.2 - 1.3 mg/dL    eGFR   Amer 106 (H) 45 - 104 mL/min/1.73    Globulin 3.6 (H) 2.3 - 3.5 gm/dL    A/G Ratio 1.2 1.1 - 1.8 g/dL    BUN/Creatinine Ratio 17.6 7.0 - 25.0    Anion Gap 11.0 5.0 - 15.0 mmol/L   Results for orders placed or performed in visit on 01/15/18   CBC Auto Differential   Result Value Ref Range    WBC 4.32 3.20 - 9.80 10*3/mm3    RBC 4.21 3.77 - 5.16 10*6/mm3    Hemoglobin 12.8 12.0 - 15.5 g/dL    Hematocrit 38.4 35.0 - 45.0 %    MCV 91.2 80.0 - 98.0 fL    MCH 30.4 26.5 - 34.0 pg    MCHC 33.3 31.4 - 36.0 g/dL     RDW 13.8 11.5 - 14.5 %    RDW-SD 45.5 36.4 - 46.3 fl    MPV 11.5 8.0 - 12.0 fL    Platelets 246 150 - 450 10*3/mm3     *Note: Due to a large number of results and/or encounters for the requested time period, some results have not been displayed. A complete set of results can be found in Results Review.

## 2019-06-06 ENCOUNTER — OFFICE VISIT (OUTPATIENT)
Dept: FAMILY MEDICINE CLINIC | Facility: CLINIC | Age: 64
End: 2019-06-06

## 2019-06-06 VITALS
HEIGHT: 65 IN | HEART RATE: 56 BPM | SYSTOLIC BLOOD PRESSURE: 122 MMHG | DIASTOLIC BLOOD PRESSURE: 70 MMHG | OXYGEN SATURATION: 98 % | WEIGHT: 266.31 LBS | BODY MASS INDEX: 44.37 KG/M2

## 2019-06-06 DIAGNOSIS — K59.04 CHRONIC IDIOPATHIC CONSTIPATION: ICD-10-CM

## 2019-06-06 DIAGNOSIS — E06.3 HASHIMOTO'S DISEASE: ICD-10-CM

## 2019-06-06 DIAGNOSIS — E78.2 MIXED HYPERLIPIDEMIA: Primary | ICD-10-CM

## 2019-06-06 PROCEDURE — 99214 OFFICE O/P EST MOD 30 MIN: CPT | Performed by: FAMILY MEDICINE

## 2019-06-06 RX ORDER — HYDROCODONE BITARTRATE AND ACETAMINOPHEN 5; 325 MG/1; MG/1
TABLET ORAL
COMMUNITY
Start: 2019-06-05 | End: 2020-02-02

## 2019-07-05 RX ORDER — ERGOCALCIFEROL 1.25 MG/1
CAPSULE ORAL
Qty: 12 CAPSULE | Refills: 0 | Status: SHIPPED | OUTPATIENT
Start: 2019-07-05 | End: 2019-09-13 | Stop reason: SDUPTHER

## 2019-08-09 ENCOUNTER — TELEPHONE (OUTPATIENT)
Dept: FAMILY MEDICINE CLINIC | Facility: CLINIC | Age: 64
End: 2019-08-09

## 2019-08-09 DIAGNOSIS — E55.9 VITAMIN D DEFICIENCY: ICD-10-CM

## 2019-08-09 DIAGNOSIS — E04.1 NONTOXIC THYROID NODULE: ICD-10-CM

## 2019-08-09 DIAGNOSIS — E06.3 HASHIMOTO'S DISEASE: Primary | ICD-10-CM

## 2019-08-09 NOTE — TELEPHONE ENCOUNTER
JACINTO HEBERT HAD TO MOVE HER APPT TO SEPT AND IS NEEDING TO GET LABS DONE BEFORE THAT...PLEASE PUT IN ORDERS

## 2019-08-12 RX ORDER — ATORVASTATIN CALCIUM 20 MG/1
20 TABLET, FILM COATED ORAL DAILY
Qty: 90 TABLET | Refills: 0 | Status: SHIPPED | OUTPATIENT
Start: 2019-08-12 | End: 2019-12-06 | Stop reason: SDUPTHER

## 2019-08-30 ENCOUNTER — LAB (OUTPATIENT)
Dept: LAB | Facility: HOSPITAL | Age: 64
End: 2019-08-30

## 2019-08-30 DIAGNOSIS — E06.3 HASHIMOTO'S DISEASE: ICD-10-CM

## 2019-08-30 DIAGNOSIS — E78.2 MIXED HYPERLIPIDEMIA: ICD-10-CM

## 2019-08-30 LAB
25(OH)D3 SERPL-MCNC: 33.8 NG/ML (ref 30–100)
ALBUMIN SERPL-MCNC: 4.3 G/DL (ref 3.5–5.2)
ALBUMIN/GLOB SERPL: 1.2 G/DL
ALP SERPL-CCNC: 101 U/L (ref 39–117)
ALT SERPL W P-5'-P-CCNC: 13 U/L (ref 1–33)
ANION GAP SERPL CALCULATED.3IONS-SCNC: 11.4 MMOL/L (ref 5–15)
AST SERPL-CCNC: 19 U/L (ref 1–32)
BASOPHILS # BLD AUTO: 0.03 10*3/MM3 (ref 0–0.2)
BASOPHILS NFR BLD AUTO: 0.7 % (ref 0–1.5)
BILIRUB SERPL-MCNC: 0.5 MG/DL (ref 0.2–1.2)
BUN BLD-MCNC: 11 MG/DL (ref 8–23)
BUN/CREAT SERPL: 17.5 (ref 7–25)
CALCIUM SPEC-SCNC: 9.6 MG/DL (ref 8.6–10.5)
CHLORIDE SERPL-SCNC: 103 MMOL/L (ref 98–107)
CHOLEST SERPL-MCNC: 189 MG/DL (ref 0–200)
CO2 SERPL-SCNC: 23.6 MMOL/L (ref 22–29)
CREAT BLD-MCNC: 0.63 MG/DL (ref 0.57–1)
DEPRECATED RDW RBC AUTO: 44.4 FL (ref 37–54)
EOSINOPHIL # BLD AUTO: 0.15 10*3/MM3 (ref 0–0.4)
EOSINOPHIL NFR BLD AUTO: 3.3 % (ref 0.3–6.2)
ERYTHROCYTE [DISTWIDTH] IN BLOOD BY AUTOMATED COUNT: 13.6 % (ref 12.3–15.4)
GFR SERPL CREATININE-BSD FRML MDRD: 116 ML/MIN/1.73
GLOBULIN UR ELPH-MCNC: 3.6 GM/DL
GLUCOSE BLD-MCNC: 84 MG/DL (ref 65–99)
HCT VFR BLD AUTO: 38.5 % (ref 34–46.6)
HDLC SERPL-MCNC: 64 MG/DL (ref 40–60)
HGB BLD-MCNC: 12.8 G/DL (ref 12–15.9)
IMM GRANULOCYTES # BLD AUTO: 0.01 10*3/MM3 (ref 0–0.05)
IMM GRANULOCYTES NFR BLD AUTO: 0.2 % (ref 0–0.5)
LDLC SERPL CALC-MCNC: 115 MG/DL (ref 0–100)
LDLC/HDLC SERPL: 1.8 {RATIO}
LYMPHOCYTES # BLD AUTO: 2.35 10*3/MM3 (ref 0.7–3.1)
LYMPHOCYTES NFR BLD AUTO: 51.1 % (ref 19.6–45.3)
MCH RBC QN AUTO: 29.8 PG (ref 26.6–33)
MCHC RBC AUTO-ENTMCNC: 33.2 G/DL (ref 31.5–35.7)
MCV RBC AUTO: 89.7 FL (ref 79–97)
MONOCYTES # BLD AUTO: 0.43 10*3/MM3 (ref 0.1–0.9)
MONOCYTES NFR BLD AUTO: 9.3 % (ref 5–12)
NEUTROPHILS # BLD AUTO: 1.63 10*3/MM3 (ref 1.7–7)
NEUTROPHILS NFR BLD AUTO: 35.4 % (ref 42.7–76)
NRBC BLD AUTO-RTO: 0 /100 WBC (ref 0–0.2)
PLATELET # BLD AUTO: 191 10*3/MM3 (ref 140–450)
PMV BLD AUTO: 13.4 FL (ref 6–12)
POTASSIUM BLD-SCNC: 3.7 MMOL/L (ref 3.5–5.2)
PROT SERPL-MCNC: 7.9 G/DL (ref 6–8.5)
RBC # BLD AUTO: 4.29 10*6/MM3 (ref 3.77–5.28)
SODIUM BLD-SCNC: 138 MMOL/L (ref 136–145)
T3 SERPL-MCNC: 127 NG/DL (ref 80–200)
T4 FREE SERPL-MCNC: 1.79 NG/DL (ref 0.93–1.7)
TRIGL SERPL-MCNC: 49 MG/DL (ref 0–150)
TSH SERPL DL<=0.05 MIU/L-ACNC: 0.65 UIU/ML (ref 0.27–4.2)
VLDLC SERPL-MCNC: 9.8 MG/DL (ref 5–40)
WBC NRBC COR # BLD: 4.6 10*3/MM3 (ref 3.4–10.8)

## 2019-08-30 PROCEDURE — 85025 COMPLETE CBC W/AUTO DIFF WBC: CPT | Performed by: NURSE PRACTITIONER

## 2019-08-30 PROCEDURE — 84480 ASSAY TRIIODOTHYRONINE (T3): CPT | Performed by: NURSE PRACTITIONER

## 2019-08-30 PROCEDURE — 84439 ASSAY OF FREE THYROXINE: CPT

## 2019-08-30 PROCEDURE — 84443 ASSAY THYROID STIM HORMONE: CPT | Performed by: NURSE PRACTITIONER

## 2019-08-30 PROCEDURE — 80053 COMPREHEN METABOLIC PANEL: CPT | Performed by: NURSE PRACTITIONER

## 2019-08-30 PROCEDURE — 36415 COLL VENOUS BLD VENIPUNCTURE: CPT | Performed by: NURSE PRACTITIONER

## 2019-08-30 PROCEDURE — 80061 LIPID PANEL: CPT

## 2019-08-30 PROCEDURE — 82306 VITAMIN D 25 HYDROXY: CPT | Performed by: NURSE PRACTITIONER

## 2019-09-06 ENCOUNTER — OFFICE VISIT (OUTPATIENT)
Dept: FAMILY MEDICINE CLINIC | Facility: CLINIC | Age: 64
End: 2019-09-06

## 2019-09-06 ENCOUNTER — APPOINTMENT (OUTPATIENT)
Dept: LAB | Facility: HOSPITAL | Age: 64
End: 2019-09-06

## 2019-09-06 VITALS
WEIGHT: 260 LBS | DIASTOLIC BLOOD PRESSURE: 76 MMHG | HEART RATE: 58 BPM | SYSTOLIC BLOOD PRESSURE: 128 MMHG | BODY MASS INDEX: 43.32 KG/M2 | OXYGEN SATURATION: 98 % | HEIGHT: 65 IN

## 2019-09-06 DIAGNOSIS — Z01.419 WOMEN'S ANNUAL ROUTINE GYNECOLOGICAL EXAMINATION: Primary | ICD-10-CM

## 2019-09-06 DIAGNOSIS — G89.29 CHRONIC RIGHT SHOULDER PAIN: ICD-10-CM

## 2019-09-06 DIAGNOSIS — M25.511 CHRONIC RIGHT SHOULDER PAIN: ICD-10-CM

## 2019-09-06 PROCEDURE — 88142 CYTOPATH C/V THIN LAYER: CPT | Performed by: FAMILY MEDICINE

## 2019-09-06 PROCEDURE — 99396 PREV VISIT EST AGE 40-64: CPT | Performed by: FAMILY MEDICINE

## 2019-09-06 PROCEDURE — 88141 CYTOPATH C/V INTERPRET: CPT | Performed by: PATHOLOGY

## 2019-09-06 RX ORDER — NAPROXEN 500 MG/1
500 TABLET ORAL 2 TIMES DAILY WITH MEALS
Qty: 60 TABLET | Refills: 1 | Status: SHIPPED | OUTPATIENT
Start: 2019-09-06 | End: 2020-02-02 | Stop reason: SDDI

## 2019-09-06 NOTE — PROGRESS NOTES
Subjective   Rosa M Ferguson is a 64 y.o. female.    cc: Women's health exam  History of Present Illness The patient comes in for women's health exam     The following portions of the patient's history were reviewed and updated as appropriate: allergies, current medications, past family history, past medical history, past social history, past surgical history and problem list.    Review of Systems   Constitutional: Negative for activity change, appetite change, chills, diaphoresis, fatigue, fever, unexpected weight gain and unexpected weight loss.   HENT: Negative for congestion, dental problem, drooling, ear discharge, ear pain, facial swelling, hearing loss, mouth sores, nosebleeds, postnasal drip, rhinorrhea, sinus pressure, sneezing, sore throat, swollen glands, tinnitus, trouble swallowing and voice change.    Eyes: Negative for blurred vision, double vision, photophobia, pain, discharge, redness, itching and visual disturbance.   Respiratory: Negative for apnea, cough, choking, chest tightness, shortness of breath, wheezing and stridor.    Cardiovascular: Negative for chest pain, palpitations and leg swelling.   Gastrointestinal: Negative for abdominal distention, abdominal pain, anal bleeding, blood in stool, constipation, diarrhea, nausea, rectal pain, vomiting, GERD and indigestion.   Endocrine: Negative for cold intolerance, heat intolerance, polydipsia, polyphagia and polyuria.   Genitourinary: Negative for breast discharge, breast lump, breast pain, decreased libido, decreased urine volume, difficulty urinating, dysuria, flank pain, frequency, genital sores, hematuria, menstrual problem, pelvic pain, pelvic pressure, urgency, urinary incontinence, vaginal bleeding, vaginal discharge and vaginal pain.   Musculoskeletal: Positive for arthralgias and myalgias. Negative for back pain, gait problem, joint swelling, neck pain, neck stiffness and bursitis.   Skin: Positive for skin lesions. Negative for color  change, dry skin, pallor, rash and bruise.   Allergic/Immunologic: Negative for environmental allergies, food allergies and immunocompromised state.   Neurological: Negative for dizziness, tremors, seizures, syncope, facial asymmetry, speech difficulty, weakness, light-headedness, numbness, headache, memory problem and confusion.   Hematological: Negative for adenopathy. Does not bruise/bleed easily.   Psychiatric/Behavioral: Negative for agitation, behavioral problems, decreased concentration, dysphoric mood, hallucinations, self-injury, sleep disturbance, suicidal ideas, negative for hyperactivity, depressed mood and stress. The patient is not nervous/anxious.        Objective   Physical Exam   Constitutional: She is oriented to person, place, and time. She appears well-developed and well-nourished.   HENT:   Head: Normocephalic and atraumatic.   Right Ear: External ear normal.   Left Ear: External ear normal.   Nose: Nose normal.   Mouth/Throat: Oropharynx is clear and moist.   Eyes: Conjunctivae are normal. Pupils are equal, round, and reactive to light.   Neck: Normal range of motion.   Cardiovascular: Normal rate, regular rhythm and normal heart sounds. Exam reveals no gallop and no friction rub.   No murmur heard.  Pulmonary/Chest: Effort normal and breath sounds normal. No stridor. No respiratory distress. She has no wheezes. She has no rales.   Abdominal: Soft. Bowel sounds are normal. She exhibits no distension and no mass. There is no tenderness. There is no guarding.   Genitourinary: Vagina normal and uterus normal.   Genitourinary Comments: External genitalia is normal. Pap Smear is plated. Bimanual exam is normal. Uterus is non tender. No adnexal masses.   Musculoskeletal:   Back is non tender. The right shoulder is tender.Good ROM bilaterally for shoulders.   Neurological: She is alert and oriented to person, place, and time.   Skin: Skin is warm and dry.   There is a T shaped lesion on the skin. It  is made of  Five discreet lesion on the cross  Bar and three going down   Vitals reviewed.        Assessment/Plan   Rosa M was seen today for follow-up.    Diagnoses and all orders for this visit:    Women's annual routine gynecological examination  -     Liquid-based Pap Smear, Screening    Chronic right shoulder pain  -     XR Shoulder 2+ View Right (In Office)  -     Sedimentation rate, automated; Future    Other orders  -     naproxen (NAPROSYN) 500 MG tablet; Take 1 tablet by mouth 2 (Two) Times a Day With Meals.        Return to the clinic in 3 month/s.  Will contact with results as needed.  Reviewed her recent labs.

## 2019-09-13 ENCOUNTER — OFFICE VISIT (OUTPATIENT)
Dept: ENDOCRINOLOGY | Facility: CLINIC | Age: 64
End: 2019-09-13

## 2019-09-13 VITALS
OXYGEN SATURATION: 98 % | HEART RATE: 59 BPM | WEIGHT: 262.2 LBS | DIASTOLIC BLOOD PRESSURE: 82 MMHG | SYSTOLIC BLOOD PRESSURE: 132 MMHG | HEIGHT: 65 IN | BODY MASS INDEX: 43.68 KG/M2

## 2019-09-13 DIAGNOSIS — E04.1 NONTOXIC THYROID NODULE: Primary | ICD-10-CM

## 2019-09-13 DIAGNOSIS — E06.3 HASHIMOTO'S DISEASE: ICD-10-CM

## 2019-09-13 DIAGNOSIS — E55.9 VITAMIN D DEFICIENCY: ICD-10-CM

## 2019-09-13 PROCEDURE — 99214 OFFICE O/P EST MOD 30 MIN: CPT | Performed by: NURSE PRACTITIONER

## 2019-09-13 RX ORDER — LEVOTHYROXINE SODIUM 0.1 MG/1
100 TABLET ORAL DAILY
Qty: 90 TABLET | Refills: 3 | Status: SHIPPED | OUTPATIENT
Start: 2019-09-13 | End: 2020-02-06 | Stop reason: SDUPTHER

## 2019-09-13 RX ORDER — ERGOCALCIFEROL 1.25 MG/1
50000 CAPSULE ORAL
Qty: 12 CAPSULE | Refills: 11 | Status: SHIPPED | OUTPATIENT
Start: 2019-09-13 | End: 2020-03-13 | Stop reason: SDUPTHER

## 2019-09-13 NOTE — PROGRESS NOTES
Subjective    Rosa M Ferguson is a 64 y.o. female. she is here today for follow-up.    History of Present Illness       History of Present Illness           64 year old female comes for follow up of hypothyroidism diagnosed in 1987.     She was on  brandname synthroid 100 mcgs daily that she takes every morning on an empty stomach.     Lab Results   Component Value Date    TSH 0.650 08/30/2019             since last appt has lost 3 lbs     In addition she has noticed visible goiter but US is stable     She denies eye pain,  red eye, diplopia, of eyelid edema.     since last appt changed to armour thyroid and states she does feels some better        In regards to bone health, she doesn't drink milk . She occasionally eats yogurt.         She has vitamin D Deficiency but hasn't been taking it consistently      She has dyslipidemia on pravachol --changed to atorovastatin--she does not remember to take everynight                         The following portions of the patient's history were reviewed and updated as appropriate:   Past Medical History:   Diagnosis Date   • Acquired hypothyroidism    • Astigmatism    • Contact dermatitis due to plants    • Dyslipidemia    • Elevated blood pressure reading without diagnosis of hypertension    • Generalized anxiety disorder    • Goiter    • Hashimoto's thyroiditis    • History of varicose veins    • Hypothyroidism    • Multinodular goiter    • Myopia    • Obesity    • Pain in eye     etiol unknown      • Plantar fasciitis    • Rash    • Thyroid nodule    • Upper respiratory infection    • Urinary tract infectious disease    • Vitamin D deficiency      Past Surgical History:   Procedure Laterality Date   • BLADDER SUSPENSION  2011   • COLONOSCOPY N/A 3/6/2017    Procedure: COLONOSCOPY;  Surgeon: Edis Schaffer MD;  Location: [facility] ENDOSCOPY;  Service:    • CYSTOSCOPY  09/23/1999    Cystoscopy and left retrograde study. Stone passed with uretherocele.   • ENDOSCOPY N/A  10/9/2017    Procedure: ESOPHAGOGASTRODUODENOSCOPY possible dilation ;  Surgeon: Edis Schaffer MD;  Location: [facility] ENDOSCOPY;  Service:    • INJECTION OF MEDICATION  2016    Kenalog (1)     • TUBAL ABDOMINAL LIGATION  1992    Bilateral partial salpingectomy. Desires sterilization.     Family History   Problem Relation Age of Onset   • Breast cancer Other    • Cancer Other    • Coronary artery disease Other    • Diabetes Other    • Cancer Mother    • Heart disease Father    • Stroke Paternal Uncle    • Breast cancer Maternal Aunt      OB History      Para Term  AB Living    3 3 3          SAB TAB Ectopic Molar Multiple Live Births                       Current Outpatient Medications   Medication Sig Dispense Refill   • atorvastatin (LIPITOR) 20 MG tablet TAKE 1 TABLET BY MOUTH DAILY 90 tablet 0   • HYDROcodone-acetaminophen (NORCO) 5-325 MG per tablet For 1 week     • levothyroxine (SYNTHROID, LEVOTHROID) 100 MCG tablet Take 1 tablet by mouth Daily. 90 tablet 3   • naproxen (NAPROSYN) 500 MG tablet Take 1 tablet by mouth 2 (Two) Times a Day With Meals. 60 tablet 1   • pantoprazole (PROTONIX) 40 MG EC tablet Take 1 tablet by mouth Daily. 30 tablet 11   • vitamin D (ERGOCALCIFEROL) 70635 units capsule capsule Take 1 capsule by mouth Every 7 (Seven) Days. 12 capsule 11     No current facility-administered medications for this visit.      No Known Allergies  Social History     Socioeconomic History   • Marital status: Single     Spouse name: Not on file   • Number of children: Not on file   • Years of education: Not on file   • Highest education level: Not on file   Tobacco Use   • Smoking status: Never Smoker   • Smokeless tobacco: Never Used   Substance and Sexual Activity   • Alcohol use: Yes     Comment: socially   • Drug use: No   • Sexual activity: Defer       Review of Systems  Review of Systems   Constitutional: Negative for activity change, appetite change, diaphoresis and fatigue.  "  HENT: Negative for facial swelling, sneezing, sore throat, tinnitus, trouble swallowing and voice change.    Eyes: Negative for photophobia, pain, discharge, redness, itching and visual disturbance.   Respiratory: Negative for apnea, cough, choking, chest tightness and shortness of breath.    Cardiovascular: Negative for chest pain, palpitations and leg swelling.   Gastrointestinal: Negative for abdominal distention, abdominal pain, constipation, diarrhea, nausea and vomiting.   Endocrine: Negative for cold intolerance, heat intolerance, polydipsia, polyphagia and polyuria.   Genitourinary: Negative for difficulty urinating, dysuria, frequency, hematuria and urgency.   Musculoskeletal: Negative for arthralgias, back pain, gait problem, joint swelling, myalgias, neck pain and neck stiffness.   Skin: Negative for color change, pallor, rash and wound.   Neurological: Negative for dizziness, tremors, weakness, light-headedness, numbness and headaches.   Hematological: Negative for adenopathy. Does not bruise/bleed easily.   Psychiatric/Behavioral: Negative for behavioral problems, confusion and sleep disturbance.        Objective    /82   Pulse 59   Ht 165.1 cm (65\")   Wt 119 kg (262 lb 3.2 oz)   LMP 08/22/2008 (Within Months)   SpO2 98%   BMI 43.63 kg/m²   Physical Exam   Constitutional: She is oriented to person, place, and time. She appears well-developed and well-nourished. No distress.   HENT:   Head: Normocephalic and atraumatic.   Right Ear: External ear normal.   Left Ear: External ear normal.   Nose: Nose normal.   Eyes: Conjunctivae and EOM are normal. Pupils are equal, round, and reactive to light.   Neck: Normal range of motion. Neck supple. No tracheal deviation present. No thyromegaly present.   Cardiovascular: Normal rate, regular rhythm and normal heart sounds.   No murmur heard.  Pulmonary/Chest: Effort normal and breath sounds normal. No respiratory distress. She has no wheezes. "   Abdominal: Soft. Bowel sounds are normal. There is no tenderness. There is no rebound and no guarding.   Musculoskeletal: Normal range of motion. She exhibits no edema, tenderness or deformity.   Neurological: She is alert and oriented to person, place, and time. No cranial nerve deficit.   Skin: Skin is warm and dry. No rash noted.   Psychiatric: She has a normal mood and affect. Her behavior is normal. Judgment and thought content normal.       Lab Review  Glucose (mg/dL)   Date Value   08/30/2019 84   02/08/2019 90   11/19/2018 89     Sodium (mmol/L)   Date Value   08/30/2019 138   02/08/2019 139   11/19/2018 138     Potassium (mmol/L)   Date Value   08/30/2019 3.7   02/08/2019 4.1   11/19/2018 3.9     Chloride (mmol/L)   Date Value   08/30/2019 103   02/08/2019 102   11/19/2018 100     CO2 (mmol/L)   Date Value   08/30/2019 23.6   02/08/2019 27.0   11/19/2018 27.0     BUN (mg/dL)   Date Value   08/30/2019 11   02/08/2019 15   11/19/2018 12     Creatinine (mg/dL)   Date Value   08/30/2019 0.63   02/08/2019 0.77   11/19/2018 0.68     Triglycerides (mg/dL)   Date Value   08/30/2019 49   02/08/2019 81   07/11/2018 79     LDL Cholesterol  (mg/dL)   Date Value   08/30/2019 115 (H)   02/08/2019 116   07/11/2018 144 (H)   07/03/2017 153 (H)       Assessment/Plan      1. Nontoxic thyroid nodule    2. Vitamin D deficiency    3. Hashimoto's disease    .    Medications prescribed:  Outpatient Encounter Medications as of 9/13/2019   Medication Sig Dispense Refill   • atorvastatin (LIPITOR) 20 MG tablet TAKE 1 TABLET BY MOUTH DAILY 90 tablet 0   • HYDROcodone-acetaminophen (NORCO) 5-325 MG per tablet For 1 week     • levothyroxine (SYNTHROID, LEVOTHROID) 100 MCG tablet Take 1 tablet by mouth Daily. 90 tablet 3   • naproxen (NAPROSYN) 500 MG tablet Take 1 tablet by mouth 2 (Two) Times a Day With Meals. 60 tablet 1   • pantoprazole (PROTONIX) 40 MG EC tablet Take 1 tablet by mouth Daily. 30 tablet 11   • vitamin D  (ERGOCALCIFEROL) 20334 units capsule capsule Take 1 capsule by mouth Every 7 (Seven) Days. 12 capsule 11   • [DISCONTINUED] levothyroxine (SYNTHROID, LEVOTHROID) 100 MCG tablet Take 1 tablet by mouth Daily. 90 tablet 3   • [DISCONTINUED] vitamin D (ERGOCALCIFEROL) 42416 units capsule capsule TAKE 1 CAPSULE BY MOUTH EVERY 7 DAYS 12 capsule 0     No facility-administered encounter medications on file as of 9/13/2019.        Orders placed during this encounter include:  Orders Placed This Encounter   Procedures   • US Thyroid     Standing Status:   Future     Standing Expiration Date:   9/13/2020     Order Specific Question:   Reason for Exam:     Answer:   multinodular goiter   • Comprehensive Metabolic Panel   • TSH   • Vitamin D 25 Hydroxy   • CBC & Differential     Order Specific Question:   Manual Differential     Answer:   No     Plan Details        Hypothyroidism due to Hashimoto's                  Levothyroxine 100 mcg one daily               Component      Latest Ref Rng & Units 8/30/2019   Free T4      0.93 - 1.70 ng/dL 1.79 (H)     Lab Results   Component Value Date    TSH 0.650 08/30/2019         She took her thyroid medication before the lab     The levothyroxine can cause an elevated free T4    No change in dosage      --------------------------------------------------------------        Nontoxic multinodular goiter      2012  barium swallow and PFT to evaluate for extrinsic compression were normal     Her Thyroid US from Sept 2011 was personally reviewed ( goiter with subcentimeter nodules ) - repeat for Oct 2013 enlarged gland , pseudonodules, left inf cyst of 1.3 cms , no need for FNA--will need repeat U/S -- -     oct. 2015     thyroid u/s      stable , questionable area in the left inferior lobe               June 2016     Reviewed by Dr. Munoz the left 1.8 cm cystic thyroid nodule did measure 1.5 cm --appears low risk for malignancy repeat in 6 months - scheduled for Jan. 13, 2017--biopsy consistent  with underactive thyroid                     Notes Recorded by Harry Person MD on 2/1/2017 at 11:38 PM  Please call patient with results. Let her know that the biopsy was consistent with a diagnosis of underactive thyroid, no malignancy. Her thyroid levels are normal so I suggest we don't change her dose.   Note for Me : FNA states insufficient but her US features are typical of Hashimoto's and the cytology shows lymphocytic infiltration as expected in Hashimoto's so I will not consider it insufficient. There is no need for repeat FNA of the lesion that I targeted since it would only show lymphocytic infiltration. Images clearly document adequate needle placement so mis sampling didn't occur. Pathology should distinguish with expected findings in hashimoto;s vs nondiagnostic.                        Repeat ultrasound Jan. 2018     No changes - stable exam            THYROID ULTRASOUND     CLINICAL INFORMATION:  Nontoxic multinodular goiter.     TECHNIQUE:  Sagittal and axial images of the thyroid are  obtained.     COMPARISON:  January 24, 2018.     FINDINGS:  Thyroid size:  Right lobe 5.5 x 2.3 x 2.5 cm, enlarged. The right  lobe is heterogenous but no evidence of any focal nodules.     Left lobe 6.1 x 2.8 x 2.7 cm, enlarged. Two discrete nodules  lower pole left lobe one is 1.4 x 1.1 x 1.4 cm. The second is 1.1  x 1.0 x 1.0 cm. Both nodules are well-circumscribed solid and  hypoechoic.  No significant changes since prior examination allowing for  differences in technique.     IMPRESSION:  CONCLUSION: Bilateral thyroid lobe enlargement. Left lobe thyroid  nodules unchanged since prior exam. Recommend follow-up  examination one years time.     Electronically signed by:  Isai Nguyen MD  2/4/2019 2:52 PM CST  Workstation: [id number]   Imaging         Repeat one year --- schedule for Feb. 2020         -----------------------------------------------------     [place] SHORT def -            change to OTC 2000 units  daily          Component      Latest Ref Rng & Units 8/30/2019   25 Hydroxy, Vitamin D      30.0 - 100.0 ng/ml 33.8                 Lab Results   Component Value Date     [id number] 460 02/08/2019         ------------------------------------------------------------------------------------     Dyslipidemia                lipitor 20 mg qhs         Component      Latest Ref Rng & Units 8/30/2019   Total Cholesterol      0 - 200 mg/dL 189   Triglycerides      0 - 150 mg/dL 49   HDL Cholesterol      40 - 60 mg/dL 64 (H)   LDL Cholesterol       0 - 100 mg/dL 115 (H)   VLDL Cholesterol      5 - 40 mg/dL 9.8   LDL/HDL Ratio       1.80            -------------------         4. Follow-up: Return in about 6 months (around 3/13/2020) for Recheck.

## 2019-09-17 LAB
GEN CATEG CVX/VAG CYTO-IMP: NORMAL
LAB AP CASE REPORT: NORMAL
LAB AP GYN ADDITIONAL INFORMATION: NORMAL
LAB AP GYN OTHER FINDINGS: NORMAL
PATH INTERP SPEC-IMP: NORMAL
STAT OF ADQ CVX/VAG CYTO-IMP: NORMAL

## 2019-11-08 NOTE — PROGRESS NOTES
Problem: Patient Care Overview  Goal: Plan of Care Review  Outcome: Ongoing (interventions implemented as appropriate)   11/08/19 0139   Coping/Psychosocial   Plan of Care Reviewed With patient   Plan of Care Review   Progress improving   OTHER   Outcome Summary VSS, HR remains in AF with controlled rate. Pt with no c/o this shift. BLE 4+ remains but pt continues to diurese very well. WCTM.           Subjective   Rosa M Ferguson is a 64 y.o. female.    cc: follow up  History of Present Illness The patient comes in for check of their chronic medical issues which include Hypothyroidism,Hyperlipidemia and Constipation.Sh4e is at her base line. .      The following portions of the patient's history were reviewed and updated as appropriate: allergies, current medications, past family history, past medical history, past social history, past surgical history and problem list.    Review of Systems   Constitutional: Negative for fatigue and fever.   Respiratory: Negative for cough, chest tightness and stridor.    Cardiovascular: Negative for chest pain, palpitations and leg swelling.       Objective   Physical Exam   Constitutional: She is oriented to person, place, and time. She appears well-developed and well-nourished.   HENT:   Head: Normocephalic and atraumatic.   Right Ear: External ear normal.   Left Ear: External ear normal.   Nose: Nose normal.   Mouth/Throat: Oropharynx is clear and moist.   Eyes: Conjunctivae are normal.   Neck: Normal range of motion.   Cardiovascular: Normal rate, regular rhythm and normal heart sounds. Exam reveals no gallop and no friction rub.   No murmur heard.  Pulmonary/Chest: Effort normal and breath sounds normal. No stridor. No respiratory distress. She has no wheezes. She has no rales.   Abdominal: Soft. Bowel sounds are normal. She exhibits no distension and no mass. There is no tenderness. There is no guarding.   Musculoskeletal: Normal range of motion.   Neurological: She is alert and oriented to person, place, and time.   Skin: Skin is warm and dry.   Vitals reviewed.        Assessment/Plan   Rosa M was seen today for follow-up.    Diagnoses and all orders for this visit:    Mixed hyperlipidemia  -     Lipid panel; Future    Chronic idiopathic constipation    Hashimoto's disease  -     Comprehensive metabolic panel; Future  -     CBC w AUTO Differential; Future  -     TSH;  Future  -     T4, free; Future        Return to the clinic in 3 month/s.  Will contact with results as needed.

## 2019-12-06 ENCOUNTER — OFFICE VISIT (OUTPATIENT)
Dept: FAMILY MEDICINE CLINIC | Facility: CLINIC | Age: 64
End: 2019-12-06

## 2019-12-06 ENCOUNTER — LAB (OUTPATIENT)
Dept: LAB | Facility: HOSPITAL | Age: 64
End: 2019-12-06

## 2019-12-06 VITALS
OXYGEN SATURATION: 99 % | DIASTOLIC BLOOD PRESSURE: 80 MMHG | HEART RATE: 61 BPM | WEIGHT: 265.1 LBS | HEIGHT: 65 IN | SYSTOLIC BLOOD PRESSURE: 150 MMHG | BODY MASS INDEX: 44.17 KG/M2

## 2019-12-06 DIAGNOSIS — G89.29 CHRONIC RIGHT SHOULDER PAIN: ICD-10-CM

## 2019-12-06 DIAGNOSIS — E78.00 HYPERCHOLESTEROLEMIA: ICD-10-CM

## 2019-12-06 DIAGNOSIS — Z23 NEEDS FLU SHOT: ICD-10-CM

## 2019-12-06 DIAGNOSIS — M25.511 CHRONIC RIGHT SHOULDER PAIN: ICD-10-CM

## 2019-12-06 DIAGNOSIS — K21.9 GASTROESOPHAGEAL REFLUX DISEASE, ESOPHAGITIS PRESENCE NOT SPECIFIED: Primary | ICD-10-CM

## 2019-12-06 LAB
ALBUMIN SERPL-MCNC: 4.3 G/DL (ref 3.5–5.2)
ALBUMIN/GLOB SERPL: 1.1 G/DL
ALP SERPL-CCNC: 106 U/L (ref 39–117)
ALT SERPL W P-5'-P-CCNC: 16 U/L (ref 1–33)
ANION GAP SERPL CALCULATED.3IONS-SCNC: 13.1 MMOL/L (ref 5–15)
AST SERPL-CCNC: 18 U/L (ref 1–32)
BASOPHILS # BLD AUTO: 0.03 10*3/MM3 (ref 0–0.2)
BASOPHILS NFR BLD AUTO: 0.7 % (ref 0–1.5)
BILIRUB SERPL-MCNC: 0.4 MG/DL (ref 0.2–1.2)
BUN BLD-MCNC: 15 MG/DL (ref 8–23)
BUN/CREAT SERPL: 20.8 (ref 7–25)
CALCIUM SPEC-SCNC: 9.5 MG/DL (ref 8.6–10.5)
CHLORIDE SERPL-SCNC: 102 MMOL/L (ref 98–107)
CHOLEST SERPL-MCNC: 272 MG/DL (ref 0–200)
CO2 SERPL-SCNC: 23.9 MMOL/L (ref 22–29)
CREAT BLD-MCNC: 0.72 MG/DL (ref 0.57–1)
DEPRECATED RDW RBC AUTO: 41.1 FL (ref 37–54)
EOSINOPHIL # BLD AUTO: 0.12 10*3/MM3 (ref 0–0.4)
EOSINOPHIL NFR BLD AUTO: 2.7 % (ref 0.3–6.2)
ERYTHROCYTE [DISTWIDTH] IN BLOOD BY AUTOMATED COUNT: 12.8 % (ref 12.3–15.4)
ERYTHROCYTE [SEDIMENTATION RATE] IN BLOOD: 51 MM/HR (ref 0–30)
GFR SERPL CREATININE-BSD FRML MDRD: 99 ML/MIN/1.73
GLOBULIN UR ELPH-MCNC: 4 GM/DL
GLUCOSE BLD-MCNC: 91 MG/DL (ref 65–99)
HCT VFR BLD AUTO: 37.6 % (ref 34–46.6)
HDLC SERPL-MCNC: 74 MG/DL (ref 40–60)
HGB BLD-MCNC: 12.7 G/DL (ref 12–15.9)
IMM GRANULOCYTES # BLD AUTO: 0.01 10*3/MM3 (ref 0–0.05)
IMM GRANULOCYTES NFR BLD AUTO: 0.2 % (ref 0–0.5)
LDLC SERPL CALC-MCNC: 185 MG/DL (ref 0–100)
LDLC/HDLC SERPL: 2.5 {RATIO}
LYMPHOCYTES # BLD AUTO: 2.35 10*3/MM3 (ref 0.7–3.1)
LYMPHOCYTES NFR BLD AUTO: 53.4 % (ref 19.6–45.3)
MCH RBC QN AUTO: 29.8 PG (ref 26.6–33)
MCHC RBC AUTO-ENTMCNC: 33.8 G/DL (ref 31.5–35.7)
MCV RBC AUTO: 88.3 FL (ref 79–97)
MONOCYTES # BLD AUTO: 0.41 10*3/MM3 (ref 0.1–0.9)
MONOCYTES NFR BLD AUTO: 9.3 % (ref 5–12)
NEUTROPHILS # BLD AUTO: 1.48 10*3/MM3 (ref 1.7–7)
NEUTROPHILS NFR BLD AUTO: 33.7 % (ref 42.7–76)
NRBC BLD AUTO-RTO: 0 /100 WBC (ref 0–0.2)
PLATELET # BLD AUTO: 254 10*3/MM3 (ref 140–450)
PMV BLD AUTO: 12 FL (ref 6–12)
POTASSIUM BLD-SCNC: 3.9 MMOL/L (ref 3.5–5.2)
PROT SERPL-MCNC: 8.3 G/DL (ref 6–8.5)
RBC # BLD AUTO: 4.26 10*6/MM3 (ref 3.77–5.28)
SODIUM BLD-SCNC: 139 MMOL/L (ref 136–145)
TRIGL SERPL-MCNC: 66 MG/DL (ref 0–150)
VLDLC SERPL-MCNC: 13.2 MG/DL (ref 5–40)
WBC NRBC COR # BLD: 4.4 10*3/MM3 (ref 3.4–10.8)

## 2019-12-06 PROCEDURE — 85652 RBC SED RATE AUTOMATED: CPT

## 2019-12-06 PROCEDURE — 99214 OFFICE O/P EST MOD 30 MIN: CPT | Performed by: FAMILY MEDICINE

## 2019-12-06 PROCEDURE — 90674 CCIIV4 VAC NO PRSV 0.5 ML IM: CPT | Performed by: FAMILY MEDICINE

## 2019-12-06 PROCEDURE — 36415 COLL VENOUS BLD VENIPUNCTURE: CPT

## 2019-12-06 PROCEDURE — 85025 COMPLETE CBC W/AUTO DIFF WBC: CPT

## 2019-12-06 PROCEDURE — 80053 COMPREHEN METABOLIC PANEL: CPT

## 2019-12-06 PROCEDURE — 90471 IMMUNIZATION ADMIN: CPT | Performed by: FAMILY MEDICINE

## 2019-12-06 PROCEDURE — 80061 LIPID PANEL: CPT

## 2019-12-06 RX ORDER — PANTOPRAZOLE SODIUM 40 MG/1
40 TABLET, DELAYED RELEASE ORAL DAILY
Qty: 90 TABLET | Refills: 3 | Status: SHIPPED | OUTPATIENT
Start: 2019-12-06 | End: 2021-02-23 | Stop reason: SDUPTHER

## 2019-12-06 RX ORDER — ATORVASTATIN CALCIUM 20 MG/1
20 TABLET, FILM COATED ORAL DAILY
Qty: 90 TABLET | Refills: 0 | Status: SHIPPED | OUTPATIENT
Start: 2019-12-06 | End: 2020-06-08 | Stop reason: SDUPTHER

## 2019-12-06 NOTE — PROGRESS NOTES
"Subjective   Rosa M Ferguson is a 64 y.o. female.   Cc: follow up of chronic medical issues  HPI The patient comes in for check of their chronic medical issues which include Hypercholesterolemia,GERD and Hypothyroidism. She is stable. She is due for a flu vaccine. .    The following portions of the patient's history were reviewed and updated as appropriate: allergies, current medications, past family history, past medical history, past social history, past surgical history and problem list.    Review of Systems   Constitutional: Negative for diaphoresis and fatigue.   Respiratory: Negative for cough, chest tightness and stridor.    Cardiovascular: Negative for chest pain, palpitations and leg swelling.       Objective   Physical Exam   Constitutional: She appears well-developed and well-nourished.   HENT:   Head: Normocephalic and atraumatic.   Right Ear: External ear normal.   Left Ear: External ear normal.   Nose: Nose normal.   Mouth/Throat: Oropharynx is clear and moist.   Eyes: Conjunctivae are normal.   Neck: No thyromegaly present.   Cardiovascular: Normal rate, regular rhythm and normal heart sounds. Exam reveals no gallop and no friction rub.   No murmur heard.  Pulmonary/Chest: Effort normal and breath sounds normal. No stridor. She has no wheezes.   Vitals reviewed.        Visit Vitals  /80 (BP Location: Left arm, Patient Position: Sitting)   Pulse 61   Ht 165.1 cm (65\")   Wt 120 kg (265 lb 1.6 oz)   LMP 08/22/2008 (Within Months)   SpO2 99%   BMI 44.11 kg/m²     Body mass index is 44.11 kg/m².      Assessment/Plan   Rosa M was seen today for follow-up and results.    Diagnoses and all orders for this visit:    Gastroesophageal reflux disease, esophagitis presence not specified    Hypercholesterolemia  -     Comprehensive metabolic panel; Future  -     Lipid panel; Future  -     CBC w AUTO Differential; Future    Needs flu shot    Other orders  -     pantoprazole (PROTONIX) 40 MG EC tablet; Take " 1 tablet by mouth Daily.  -     atorvastatin (LIPITOR) 20 MG tablet; Take 1 tablet by mouth Daily.  -     Fluzone  ([id number])    Return to the clinic in 6 month/s.  Will contact with results as needed.

## 2020-02-06 ENCOUNTER — OFFICE VISIT (OUTPATIENT)
Dept: FAMILY MEDICINE CLINIC | Facility: CLINIC | Age: 65
End: 2020-02-06

## 2020-02-06 ENCOUNTER — TELEPHONE (OUTPATIENT)
Dept: FAMILY MEDICINE CLINIC | Facility: CLINIC | Age: 65
End: 2020-02-06

## 2020-02-06 VITALS
SYSTOLIC BLOOD PRESSURE: 128 MMHG | WEIGHT: 269 LBS | BODY MASS INDEX: 44.82 KG/M2 | HEIGHT: 65 IN | OXYGEN SATURATION: 99 % | DIASTOLIC BLOOD PRESSURE: 70 MMHG | HEART RATE: 71 BPM

## 2020-02-06 DIAGNOSIS — H92.02 LEFT EAR PAIN: Primary | ICD-10-CM

## 2020-02-06 DIAGNOSIS — H57.12 PAIN OF LEFT EYE: ICD-10-CM

## 2020-02-06 PROCEDURE — 99213 OFFICE O/P EST LOW 20 MIN: CPT | Performed by: FAMILY MEDICINE

## 2020-02-06 RX ORDER — LEVOTHYROXINE SODIUM 0.1 MG/1
100 TABLET ORAL DAILY
Qty: 90 TABLET | Refills: 0 | Status: SHIPPED | OUTPATIENT
Start: 2020-02-06 | End: 2020-03-13 | Stop reason: SDUPTHER

## 2020-02-06 NOTE — PROGRESS NOTES
"Subjective   Rosa M Ferguson is a 65 y.o. female.   Cc: follow up of eye and ear pain  HPI The patient comes in for follow up of left eue and ear pain. She was started on antibiotics. The pain began improving shortly after her visit in the Urgent Care.    The following portions of the patient's history were reviewed and updated as appropriate: allergies, current medications, past family history, past medical history, past social history, past surgical history and problem list.    Review of Systems   Constitutional: Negative for fatigue and fever.   HENT: Negative for ear discharge and ear pain.    Eyes: Negative for pain, discharge, redness and itching.       Objective   Physical Exam   Constitutional: She appears well-developed and well-nourished.   HENT:   Head: Normocephalic and atraumatic.   Right Ear: External ear normal.   Left Ear: External ear normal.   Nose: Nose normal.   Mouth/Throat: Oropharynx is clear and moist.   Eyes: Conjunctivae are normal.   Cardiovascular: Normal rate, regular rhythm and normal heart sounds. Exam reveals no gallop and no friction rub.   No murmur heard.  Pulmonary/Chest: Effort normal and breath sounds normal. No stridor. No respiratory distress.   Abdominal: Soft. Bowel sounds are normal. She exhibits no distension. There is no tenderness. There is no guarding.   Skin: Skin is warm and dry.   Vitals reviewed.        Visit Vitals  /70   Pulse 71   Ht 165.1 cm (65\")   Wt 122 kg (269 lb)   LMP 08/22/2008 (Within Months)   SpO2 99%   Breastfeeding No   BMI 44.76 kg/m²     Body mass index is 44.76 kg/m².      Assessment/Plan   Rosa M was seen today for follow-up and care center.    Diagnoses and all orders for this visit:    Left ear pain    Pain of left eye    Continue with the Amoxicillin.Follow up as needed.  "

## 2020-02-07 RX ORDER — LEVOTHYROXINE SODIUM 0.1 MG/1
100 TABLET ORAL DAILY
Qty: 90 TABLET | Refills: 0 | OUTPATIENT
Start: 2020-02-07

## 2020-02-13 ENCOUNTER — HOSPITAL ENCOUNTER (OUTPATIENT)
Dept: ULTRASOUND IMAGING | Facility: HOSPITAL | Age: 65
Discharge: HOME | End: 2020-02-13
Admitting: NURSE PRACTITIONER

## 2020-02-13 DIAGNOSIS — E04.1 NONTOXIC THYROID NODULE: ICD-10-CM

## 2020-02-13 PROCEDURE — 76536 US EXAM OF HEAD AND NECK: CPT

## 2020-03-06 ENCOUNTER — APPOINTMENT (OUTPATIENT)
Dept: LAB | Facility: HOSPITAL | Age: 65
End: 2020-03-06

## 2020-03-06 LAB
25(OH)D3 SERPL-MCNC: 25.8 NG/ML (ref 30–100)
ALBUMIN SERPL-MCNC: 4.1 G/DL (ref 3.5–5.2)
ALBUMIN/GLOB SERPL: 1.1 G/DL
ALP SERPL-CCNC: 114 U/L (ref 39–117)
ALT SERPL W P-5'-P-CCNC: 17 U/L (ref 1–33)
ANION GAP SERPL CALCULATED.3IONS-SCNC: 13.8 MMOL/L (ref 5–15)
AST SERPL-CCNC: 21 U/L (ref 1–32)
BASOPHILS # BLD MANUAL: 0.15 10*3/MM3 (ref 0–0.2)
BASOPHILS NFR BLD AUTO: 3.1 % (ref 0–1.5)
BILIRUB SERPL-MCNC: 0.4 MG/DL (ref 0.2–1.2)
BUN BLD-MCNC: 13 MG/DL (ref 8–23)
BUN/CREAT SERPL: 15.7 (ref 7–25)
CALCIUM SPEC-SCNC: 9.8 MG/DL (ref 8.6–10.5)
CHLORIDE SERPL-SCNC: 102 MMOL/L (ref 98–107)
CO2 SERPL-SCNC: 23.2 MMOL/L (ref 22–29)
CREAT BLD-MCNC: 0.83 MG/DL (ref 0.57–1)
DEPRECATED RDW RBC AUTO: 42.4 FL (ref 37–54)
EOSINOPHIL # BLD MANUAL: 0.15 10*3/MM3 (ref 0–0.4)
EOSINOPHIL NFR BLD MANUAL: 3.1 % (ref 0.3–6.2)
ERYTHROCYTE [DISTWIDTH] IN BLOOD BY AUTOMATED COUNT: 12.9 % (ref 12.3–15.4)
GFR SERPL CREATININE-BSD FRML MDRD: 84 ML/MIN/1.73
GLOBULIN UR ELPH-MCNC: 3.7 GM/DL
GLUCOSE BLD-MCNC: 82 MG/DL (ref 65–99)
HCT VFR BLD AUTO: 39.9 % (ref 34–46.6)
HGB BLD-MCNC: 13.4 G/DL (ref 12–15.9)
LYMPHOCYTES # BLD MANUAL: 2.51 10*3/MM3 (ref 0.7–3.1)
LYMPHOCYTES NFR BLD MANUAL: 10.2 % (ref 5–12)
LYMPHOCYTES NFR BLD MANUAL: 51 % (ref 19.6–45.3)
MCH RBC QN AUTO: 30.3 PG (ref 26.6–33)
MCHC RBC AUTO-ENTMCNC: 33.6 G/DL (ref 31.5–35.7)
MCV RBC AUTO: 90.3 FL (ref 79–97)
MONOCYTES # BLD AUTO: 0.5 10*3/MM3 (ref 0.1–0.9)
NEUTROPHILS # BLD AUTO: 1.41 10*3/MM3 (ref 1.7–7)
NEUTROPHILS NFR BLD MANUAL: 28.6 % (ref 42.7–76)
PLAT MORPH BLD: NORMAL
PLATELET # BLD AUTO: 239 10*3/MM3 (ref 140–450)
PMV BLD AUTO: 11.8 FL (ref 6–12)
POTASSIUM BLD-SCNC: 3.9 MMOL/L (ref 3.5–5.2)
PROT SERPL-MCNC: 7.8 G/DL (ref 6–8.5)
RBC # BLD AUTO: 4.42 10*6/MM3 (ref 3.77–5.28)
RBC MORPH BLD: NORMAL
SMUDGE CELLS BLD QL SMEAR: ABNORMAL
SODIUM BLD-SCNC: 139 MMOL/L (ref 136–145)
TSH SERPL DL<=0.05 MIU/L-ACNC: 3.67 UIU/ML (ref 0.27–4.2)
VARIANT LYMPHS NFR BLD MANUAL: 4.1 % (ref 0–5)
WBC NRBC COR # BLD: 4.93 10*3/MM3 (ref 3.4–10.8)

## 2020-03-06 PROCEDURE — 84443 ASSAY THYROID STIM HORMONE: CPT | Performed by: NURSE PRACTITIONER

## 2020-03-06 PROCEDURE — 80053 COMPREHEN METABOLIC PANEL: CPT | Performed by: NURSE PRACTITIONER

## 2020-03-06 PROCEDURE — 85025 COMPLETE CBC W/AUTO DIFF WBC: CPT | Performed by: NURSE PRACTITIONER

## 2020-03-06 PROCEDURE — 82306 VITAMIN D 25 HYDROXY: CPT | Performed by: NURSE PRACTITIONER

## 2020-03-06 PROCEDURE — 36415 COLL VENOUS BLD VENIPUNCTURE: CPT | Performed by: NURSE PRACTITIONER

## 2020-03-06 PROCEDURE — 85007 BL SMEAR W/DIFF WBC COUNT: CPT | Performed by: NURSE PRACTITIONER

## 2020-03-13 ENCOUNTER — OFFICE VISIT (OUTPATIENT)
Dept: ENDOCRINOLOGY | Facility: CLINIC | Age: 65
End: 2020-03-13

## 2020-03-13 VITALS
OXYGEN SATURATION: 100 % | SYSTOLIC BLOOD PRESSURE: 140 MMHG | HEART RATE: 65 BPM | DIASTOLIC BLOOD PRESSURE: 76 MMHG | BODY MASS INDEX: 44.82 KG/M2 | HEIGHT: 65 IN | WEIGHT: 269 LBS

## 2020-03-13 DIAGNOSIS — E55.9 VITAMIN D DEFICIENCY: ICD-10-CM

## 2020-03-13 DIAGNOSIS — E04.1 NONTOXIC THYROID NODULE: ICD-10-CM

## 2020-03-13 DIAGNOSIS — E06.3 HASHIMOTO'S DISEASE: Primary | ICD-10-CM

## 2020-03-13 PROCEDURE — 99214 OFFICE O/P EST MOD 30 MIN: CPT | Performed by: NURSE PRACTITIONER

## 2020-03-13 RX ORDER — ERGOCALCIFEROL 1.25 MG/1
50000 CAPSULE ORAL
Qty: 12 CAPSULE | Refills: 3 | Status: SHIPPED | OUTPATIENT
Start: 2020-03-13 | End: 2021-03-19 | Stop reason: SDUPTHER

## 2020-03-13 RX ORDER — LEVOTHYROXINE SODIUM 0.1 MG/1
100 TABLET ORAL DAILY
Qty: 90 TABLET | Refills: 3 | Status: SHIPPED | OUTPATIENT
Start: 2020-03-13 | End: 2021-03-19 | Stop reason: SDUPTHER

## 2020-03-13 NOTE — PROGRESS NOTES
Subjective    Rosa M Ferguson is a 65 y.o. female. she is here today for follow-up.    History of Present Illness       History of Present Illness           64 year old female comes for follow up of hypothyroidism diagnosed in 1987.     She was on  brandname synthroid 100 mcgs daily that she takes every morning on an empty stomach.       Lab Results   Component Value Date    TSH 3.670 03/06/2020                   since last appt has lost 3 lbs     In addition she has noticed visible goiter but US is stable     She denies eye pain,  red eye, diplopia, of eyelid edema.             In regards to bone health, she doesn't drink milk . She occasionally eats yogurt.         She has vitamin D Deficiency but hasn't been taking it consistently      She has dyslipidemia on pravachol --changed to atorovastatin--she does not remember to take everynight                       The following portions of the patient's history were reviewed and updated as appropriate:   Past Medical History:   Diagnosis Date   • Acquired hypothyroidism    • Astigmatism    • Contact dermatitis due to plants    • Dyslipidemia    • Elevated blood pressure reading without diagnosis of hypertension    • Generalized anxiety disorder    • Goiter    • Hashimoto's thyroiditis    • History of varicose veins    • Hypothyroidism    • Multinodular goiter    • Myopia    • Obesity    • Pain in eye     etiol unknown      • Plantar fasciitis    • Rash    • Thyroid nodule    • Upper respiratory infection    • Urinary tract infectious disease    • Vitamin D deficiency      Past Surgical History:   Procedure Laterality Date   • BLADDER SUSPENSION  2011   • COLONOSCOPY N/A 3/6/2017    Procedure: COLONOSCOPY;  Surgeon: Edis Schaffer MD;  Location: [facility] ENDOSCOPY;  Service:    • CYSTOSCOPY  09/23/1999    Cystoscopy and left retrograde study. Stone passed with uretherocele.   • ENDOSCOPY N/A 10/9/2017    Procedure: ESOPHAGOGASTRODUODENOSCOPY possible dilation ;   Surgeon: Edis Schaffer MD;  Location: [facility] ENDOSCOPY;  Service:    • INJECTION OF MEDICATION  2016    Kenalog (1)     • TUBAL ABDOMINAL LIGATION  1992    Bilateral partial salpingectomy. Desires sterilization.     Family History   Problem Relation Age of Onset   • Breast cancer Other    • Cancer Other    • Coronary artery disease Other    • Diabetes Other    • Cancer Mother    • Heart disease Father    • Stroke Paternal Uncle    • Breast cancer Maternal Aunt      OB History        3    Para   3    Term   3            AB        Living           SAB        TAB        Ectopic        Molar        Multiple        Live Births                  Current Outpatient Medications   Medication Sig Dispense Refill   • atorvastatin (LIPITOR) 20 MG tablet Take 1 tablet by mouth Daily. 90 tablet 0   • levothyroxine (SYNTHROID, LEVOTHROID) 100 MCG tablet Take 1 tablet by mouth Daily. 90 tablet 0   • pantoprazole (PROTONIX) 40 MG EC tablet Take 1 tablet by mouth Daily. 90 tablet 3   • vitamin D (ERGOCALCIFEROL) 01144 units capsule capsule Take 1 capsule by mouth Every 7 (Seven) Days. 12 capsule 11   • amoxicillin (AMOXIL) 500 MG capsule Take 1 capsule by mouth 3 (Three) Times a Day. 30 capsule 0     No current facility-administered medications for this visit.      No Known Allergies  Social History     Socioeconomic History   • Marital status: Single     Spouse name: Not on file   • Number of children: Not on file   • Years of education: Not on file   • Highest education level: Not on file   Tobacco Use   • Smoking status: Never Smoker   • Smokeless tobacco: Never Used   Substance and Sexual Activity   • Alcohol use: Yes     Comment: socially   • Drug use: No   • Sexual activity: Defer       Review of Systems  Review of Systems   Constitutional: Negative for activity change, appetite change, diaphoresis and fatigue.   HENT: Negative for facial swelling, sneezing, sore throat, tinnitus, trouble swallowing  "and voice change.    Eyes: Negative for photophobia, pain, discharge, redness, itching and visual disturbance.   Respiratory: Negative for apnea, cough, choking, chest tightness and shortness of breath.    Cardiovascular: Negative for chest pain, palpitations and leg swelling.   Gastrointestinal: Negative for abdominal distention, abdominal pain, constipation, diarrhea, nausea and vomiting.   Endocrine: Negative for cold intolerance, heat intolerance, polydipsia, polyphagia and polyuria.   Genitourinary: Negative for difficulty urinating, dysuria, frequency, hematuria and urgency.   Musculoskeletal: Negative for arthralgias, back pain, gait problem, joint swelling, myalgias, neck pain and neck stiffness.   Skin: Negative for color change, pallor, rash and wound.   Neurological: Negative for dizziness, tremors, weakness, light-headedness, numbness and headaches.   Hematological: Negative for adenopathy. Does not bruise/bleed easily.   Psychiatric/Behavioral: Negative for behavioral problems, confusion and sleep disturbance.        Objective    /76   Pulse 65   Ht 165.1 cm (65\")   Wt 122 kg (269 lb)   LMP 08/22/2008 (Within Months)   SpO2 100%   BMI 44.76 kg/m²   Physical Exam   Constitutional: She is oriented to person, place, and time. She appears well-developed and well-nourished. No distress.   HENT:   Head: Normocephalic and atraumatic.   Right Ear: External ear normal.   Left Ear: External ear normal.   Nose: Nose normal.   Eyes: Pupils are equal, round, and reactive to light. Conjunctivae and EOM are normal.   Neck: Normal range of motion. Neck supple. No tracheal deviation present. No thyromegaly present.   Cardiovascular: Normal rate, regular rhythm and normal heart sounds.   No murmur heard.  Pulmonary/Chest: Effort normal and breath sounds normal. No respiratory distress. She has no wheezes.   Abdominal: Soft. Bowel sounds are normal. There is no tenderness. There is no rebound and no guarding. "   Musculoskeletal: Normal range of motion. She exhibits no edema, tenderness or deformity.   Neurological: She is alert and oriented to person, place, and time. No cranial nerve deficit.   Skin: Skin is warm and dry. No rash noted.   Psychiatric: She has a normal mood and affect. Her behavior is normal. Judgment and thought content normal.       Lab Review  Glucose (mg/dL)   Date Value   03/06/2020 82   12/06/2019 91   08/30/2019 84     Sodium (mmol/L)   Date Value   03/06/2020 139   12/06/2019 139   08/30/2019 138     Potassium (mmol/L)   Date Value   03/06/2020 3.9   12/06/2019 3.9   08/30/2019 3.7     Chloride (mmol/L)   Date Value   03/06/2020 102   12/06/2019 102   08/30/2019 103     CO2 (mmol/L)   Date Value   03/06/2020 23.2   12/06/2019 23.9   08/30/2019 23.6     BUN (mg/dL)   Date Value   03/06/2020 13   12/06/2019 15   08/30/2019 11     Creatinine (mg/dL)   Date Value   03/06/2020 0.83   12/06/2019 0.72   08/30/2019 0.63     Triglycerides (mg/dL)   Date Value   12/06/2019 66   08/30/2019 49   02/08/2019 81     LDL Cholesterol  (mg/dL)   Date Value   12/06/2019 185 (H)   08/30/2019 115 (H)   02/08/2019 116   07/11/2018 144 (H)   07/03/2017 153 (H)       Assessment/Plan      1. Hashimoto's disease    2. Nontoxic thyroid nodule    .    Medications prescribed:  Outpatient Encounter Medications as of 3/13/2020   Medication Sig Dispense Refill   • atorvastatin (LIPITOR) 20 MG tablet Take 1 tablet by mouth Daily. 90 tablet 0   • levothyroxine (SYNTHROID, LEVOTHROID) 100 MCG tablet Take 1 tablet by mouth Daily. 90 tablet 0   • pantoprazole (PROTONIX) 40 MG EC tablet Take 1 tablet by mouth Daily. 90 tablet 3   • vitamin D (ERGOCALCIFEROL) 26249 units capsule capsule Take 1 capsule by mouth Every 7 (Seven) Days. 12 capsule 11   • amoxicillin (AMOXIL) 500 MG capsule Take 1 capsule by mouth 3 (Three) Times a Day. 30 capsule 0     No facility-administered encounter medications on file as of 3/13/2020.        Orders  placed during this encounter include:  No orders of the defined types were placed in this encounter.  Hypothyroidism due to Hashimoto's                  Levothyroxine 100 mcg one daily            Lab Results   Component Value Date    TSH 3.670 03/06/2020                     She took her thyroid medication before the lab      The levothyroxine can cause an elevated free T4     No change in dosage       --------------------------------------------------------------        Nontoxic multinodular goiter      2012  barium swallow and PFT to evaluate for extrinsic compression were normal                   June 2016     Reviewed by Dr. Munoz the left 1.8 cm cystic thyroid nodule did measure 1.5 cm --appears low risk for malignancy repeat in 6 months - scheduled for Jan. 13, 2017--biopsy consistent with underactive thyroid                     Notes Recorded by Harry Person MD on 2/1/2017 at 11:38 PM  Please call patient with results. Let her know that the biopsy was consistent with a diagnosis of underactive thyroid, no malignancy. Her thyroid levels are normal so I suggest we don't change her dose.   Note for Me : FNA states insufficient but her US features are typical of Hashimoto's and the cytology shows lymphocytic infiltration as expected in Hashimoto's so I will not consider it insufficient. There is no need for repeat FNA of the lesion that I targeted since it would only show lymphocytic infiltration. Images clearly document adequate needle placement so mis sampling didn't occur. Pathology should distinguish with expected findings in hashimoto;s vs nondiagnostic.                        Repeat ultrasound Jan. 2018     No changes - stable exam           Imaging         Feb. 2020       THYROID ULTRASOUND     CLINICAL INFORMATION:  Multinodular goiter. Nontoxic thyroid  nodule.     TECHNIQUE:  Sagittal and axial images.        COMPARISON:  Ultrasound February 4, 2019.        FINDINGS:  The right lobe is  markedly enlarged 5.5 x 2.5 x 2.7 cm.  Heterogeneity and numerous poorly defined nodules. Increased  vascularity right lobe.      The left lobe is enlarged 6.2 x 2.2 x 3.3 cm. Numerous nodules,  most of which have a similar appearance to prior examination.  Development of some cystic changes in a nodule in the upper pole  on today's examination. Otherwise no significant changes. Diffuse  increased vascularity.     IMPRESSION:  CONCLUSION: Bilateral thyroid enlargement. Multinodular goiter.  Interval development of cystic changes in a nodule in the upper  pole left lobe. Otherwise there are no significant changes.     Electronically signed by:  Isai Nguyen MD  2/13/2020 3:24 PM CST  Workstation: [id number]              -----------------------------------------------------     [place] SHORT def -            change to OTC 2000 units daily          Component      Latest Ref Rng & Units 3/6/2020   25 Hydroxy, Vitamin D      30.0 - 100.0 ng/ml 25.8 (L)        ------------------------------------------------------------------------------------     Dyslipidemia                 lipitor 20 mg qhs            Component      Latest Ref Rng & Units 8/30/2019   Total Cholesterol      0 - 200 mg/dL 189   Triglycerides      0 - 150 mg/dL 49   HDL Cholesterol      40 - 60 mg/dL 64 (H)   LDL Cholesterol       0 - 100 mg/dL 115 (H)   VLDL Cholesterol      5 - 40 mg/dL 9.8   LDL/HDL Ratio       1.80              4. Follow-up: No follow-ups on file.

## 2020-06-08 ENCOUNTER — OFFICE VISIT (OUTPATIENT)
Dept: FAMILY MEDICINE CLINIC | Facility: CLINIC | Age: 65
End: 2020-06-08

## 2020-06-08 ENCOUNTER — LAB (OUTPATIENT)
Dept: LAB | Facility: HOSPITAL | Age: 65
End: 2020-06-08

## 2020-06-08 VITALS
SYSTOLIC BLOOD PRESSURE: 134 MMHG | DIASTOLIC BLOOD PRESSURE: 76 MMHG | OXYGEN SATURATION: 98 % | HEIGHT: 65 IN | BODY MASS INDEX: 45.98 KG/M2 | WEIGHT: 276 LBS | HEART RATE: 72 BPM

## 2020-06-08 DIAGNOSIS — E78.2 MIXED HYPERLIPIDEMIA: Primary | ICD-10-CM

## 2020-06-08 DIAGNOSIS — M25.572 ARTHRALGIA OF BOTH ANKLES: ICD-10-CM

## 2020-06-08 DIAGNOSIS — E03.9 HYPOTHYROIDISM, UNSPECIFIED TYPE: ICD-10-CM

## 2020-06-08 DIAGNOSIS — K21.9 GASTROESOPHAGEAL REFLUX DISEASE, ESOPHAGITIS PRESENCE NOT SPECIFIED: ICD-10-CM

## 2020-06-08 DIAGNOSIS — E78.2 MIXED HYPERLIPIDEMIA: ICD-10-CM

## 2020-06-08 DIAGNOSIS — M25.571 ARTHRALGIA OF BOTH ANKLES: ICD-10-CM

## 2020-06-08 LAB
ALBUMIN SERPL-MCNC: 4.2 G/DL (ref 3.5–5.2)
ALBUMIN/GLOB SERPL: 1.2 G/DL
ALP SERPL-CCNC: 96 U/L (ref 39–117)
ALT SERPL W P-5'-P-CCNC: 19 U/L (ref 1–33)
ANION GAP SERPL CALCULATED.3IONS-SCNC: 9.5 MMOL/L (ref 5–15)
AST SERPL-CCNC: 21 U/L (ref 1–32)
BASOPHILS # BLD AUTO: 0.02 10*3/MM3 (ref 0–0.2)
BASOPHILS NFR BLD AUTO: 0.4 % (ref 0–1.5)
BILIRUB SERPL-MCNC: 0.3 MG/DL (ref 0.2–1.2)
BUN BLD-MCNC: 17 MG/DL (ref 8–23)
BUN/CREAT SERPL: 21.5 (ref 7–25)
CALCIUM SPEC-SCNC: 9.6 MG/DL (ref 8.6–10.5)
CHLORIDE SERPL-SCNC: 106 MMOL/L (ref 98–107)
CHOLEST SERPL-MCNC: 184 MG/DL (ref 0–200)
CO2 SERPL-SCNC: 23.5 MMOL/L (ref 22–29)
CREAT BLD-MCNC: 0.79 MG/DL (ref 0.57–1)
DEPRECATED RDW RBC AUTO: 42 FL (ref 37–54)
EOSINOPHIL # BLD AUTO: 0.14 10*3/MM3 (ref 0–0.4)
EOSINOPHIL NFR BLD AUTO: 2.9 % (ref 0.3–6.2)
ERYTHROCYTE [DISTWIDTH] IN BLOOD BY AUTOMATED COUNT: 12.9 % (ref 12.3–15.4)
GFR SERPL CREATININE-BSD FRML MDRD: 89 ML/MIN/1.73
GLOBULIN UR ELPH-MCNC: 3.5 GM/DL
GLUCOSE BLD-MCNC: 93 MG/DL (ref 65–99)
HCT VFR BLD AUTO: 36.4 % (ref 34–46.6)
HDLC SERPL-MCNC: 63 MG/DL (ref 40–60)
HGB BLD-MCNC: 12.3 G/DL (ref 12–15.9)
IMM GRANULOCYTES # BLD AUTO: 0.01 10*3/MM3 (ref 0–0.05)
IMM GRANULOCYTES NFR BLD AUTO: 0.2 % (ref 0–0.5)
LDLC SERPL CALC-MCNC: 111 MG/DL (ref 0–100)
LDLC/HDLC SERPL: 1.76 {RATIO}
LYMPHOCYTES # BLD AUTO: 2.37 10*3/MM3 (ref 0.7–3.1)
LYMPHOCYTES NFR BLD AUTO: 49.6 % (ref 19.6–45.3)
MCH RBC QN AUTO: 30 PG (ref 26.6–33)
MCHC RBC AUTO-ENTMCNC: 33.8 G/DL (ref 31.5–35.7)
MCV RBC AUTO: 88.8 FL (ref 79–97)
MONOCYTES # BLD AUTO: 0.43 10*3/MM3 (ref 0.1–0.9)
MONOCYTES NFR BLD AUTO: 9 % (ref 5–12)
NEUTROPHILS # BLD AUTO: 1.81 10*3/MM3 (ref 1.7–7)
NEUTROPHILS NFR BLD AUTO: 37.9 % (ref 42.7–76)
NRBC BLD AUTO-RTO: 0.2 /100 WBC (ref 0–0.2)
PLATELET # BLD AUTO: 232 10*3/MM3 (ref 140–450)
PMV BLD AUTO: 11.9 FL (ref 6–12)
POTASSIUM BLD-SCNC: 3.9 MMOL/L (ref 3.5–5.2)
PROT SERPL-MCNC: 7.7 G/DL (ref 6–8.5)
RBC # BLD AUTO: 4.1 10*6/MM3 (ref 3.77–5.28)
SODIUM BLD-SCNC: 139 MMOL/L (ref 136–145)
T4 FREE SERPL-MCNC: 1.49 NG/DL (ref 0.93–1.7)
TRIGL SERPL-MCNC: 50 MG/DL (ref 0–150)
TSH SERPL DL<=0.05 MIU/L-ACNC: 0.98 UIU/ML (ref 0.27–4.2)
VLDLC SERPL-MCNC: 10 MG/DL (ref 5–40)
WBC NRBC COR # BLD: 4.78 10*3/MM3 (ref 3.4–10.8)

## 2020-06-08 PROCEDURE — 99214 OFFICE O/P EST MOD 30 MIN: CPT | Performed by: FAMILY MEDICINE

## 2020-06-08 PROCEDURE — 85025 COMPLETE CBC W/AUTO DIFF WBC: CPT

## 2020-06-08 PROCEDURE — 80061 LIPID PANEL: CPT

## 2020-06-08 PROCEDURE — 84443 ASSAY THYROID STIM HORMONE: CPT

## 2020-06-08 PROCEDURE — 84439 ASSAY OF FREE THYROXINE: CPT

## 2020-06-08 PROCEDURE — 36415 COLL VENOUS BLD VENIPUNCTURE: CPT

## 2020-06-08 PROCEDURE — 80053 COMPREHEN METABOLIC PANEL: CPT

## 2020-06-08 RX ORDER — ATORVASTATIN CALCIUM 20 MG/1
20 TABLET, FILM COATED ORAL DAILY
Qty: 90 TABLET | Refills: 1 | Status: SHIPPED | OUTPATIENT
Start: 2020-06-08 | End: 2020-11-30

## 2020-06-08 RX ORDER — PREDNISONE 10 MG/1
TABLET ORAL
Qty: 30 TABLET | Refills: 0 | Status: SHIPPED | OUTPATIENT
Start: 2020-06-08 | End: 2020-09-14

## 2020-06-08 NOTE — PROGRESS NOTES
Subjective   Rosa M Ferguson is a 65 y.o. female.   Cc: follow up of chronic medical issues    Hyperlipidemia   This is a chronic problem. The current episode started more than 1 year ago. The problem is resistant. Recent lipid tests were reviewed and are variable. Exacerbating diseases include hypothyroidism. Pertinent negatives include no chest pain or myalgias. Current antihyperlipidemic treatment includes statins.   Hypothyroidism   This is a chronic problem. The current episode started more than 1 year ago. The problem occurs daily. The problem has been gradually improving. Associated symptoms include arthralgias, joint swelling and nausea. Pertinent negatives include no abdominal pain, anorexia, change in bowel habit, chest pain, chills, congestion, coughing, diaphoresis, fatigue, fever, headaches, myalgias, neck pain, numbness, rash, sore throat, swollen glands, urinary symptoms, vertigo, visual change, vomiting or weakness.   Heartburn   She complains of heartburn, a hoarse voice and nausea. She reports no abdominal pain, no belching, no chest pain, no choking, no coughing, no dysphagia, no early satiety, no sore throat, no stridor or no water brash. Pertinent negatives include no fatigue.   She is at her base line.Her ankles have been swollen and swollen to the touch. It has been going on for a month.  The following portions of the patient's history were reviewed and updated as appropriate: allergies, current medications, past family history, past medical history, past social history, past surgical history and problem list.    Review of Systems   Constitutional: Negative for chills, diaphoresis, fatigue and fever.   HENT: Positive for hoarse voice. Negative for congestion and sore throat.    Respiratory: Negative for cough and choking.    Cardiovascular: Negative for chest pain.   Gastrointestinal: Positive for heartburn and nausea. Negative for abdominal pain, anorexia, change in bowel habit, dysphagia  "and vomiting.   Musculoskeletal: Positive for arthralgias and joint swelling. Negative for myalgias and neck pain.   Skin: Negative for rash.   Neurological: Negative for vertigo, weakness, numbness and headaches.       Objective   Physical Exam   Constitutional: She is oriented to person, place, and time. She appears well-developed and well-nourished.   HENT:   Head: Normocephalic and atraumatic.   Right Ear: External ear normal.   Left Ear: External ear normal.   Nose: Nose normal.   Mouth/Throat: Oropharynx is clear and moist.   Eyes: Pupils are equal, round, and reactive to light. EOM are normal.   Neck: Normal range of motion.   Cardiovascular: Normal rate, regular rhythm and normal heart sounds. Exam reveals no gallop and no friction rub.   No murmur heard.  Pulmonary/Chest: Effort normal and breath sounds normal. No stridor. No respiratory distress. She has no wheezes. She has no rales.   Abdominal: Soft. Bowel sounds are normal. She exhibits no distension and no mass. There is no tenderness. There is no guarding.   Musculoskeletal:   Back  Is non tender on palpation.Ankles are tender and puffy on palpation.   Neurological: She is alert and oriented to person, place, and time.   Skin: Skin is warm and dry.   Vitals reviewed.        Visit Vitals  /76   Pulse 72   Ht 165.1 cm (65\")   Wt 125 kg (276 lb)   LMP 08/22/2008 (Within Months)   SpO2 98%   Breastfeeding No   BMI 45.93 kg/m²     Body mass index is 45.93 kg/m².      Assessment/Plan   Rosa M was seen today for follow-up.    Diagnoses and all orders for this visit:    Mixed hyperlipidemia  -     Comprehensive metabolic panel; Future  -     Lipid panel; Future  -     CBC w AUTO Differential; Future    Gastroesophageal reflux disease, esophagitis presence not specified    Hypothyroidism, unspecified type  -     T4, free; Future  -     TSH; Future    Arthralgia of both ankles    Other orders  -     atorvastatin (LIPITOR) 20 MG tablet; Take 1 tablet by " mouth Daily.  -     predniSONE (DELTASONE) 10 MG tablet; 4 daily times three days, 3 daily times three days, 2 daily times three days,1 daily times three days    Return to the clinic in 3 month/s.  Will contact with results as needed.

## 2020-09-10 ENCOUNTER — LAB (OUTPATIENT)
Dept: LAB | Facility: HOSPITAL | Age: 65
End: 2020-09-10

## 2020-09-10 LAB
25(OH)D3 SERPL-MCNC: 43.8 NG/ML (ref 30–100)
ALBUMIN SERPL-MCNC: 4.4 G/DL (ref 3.5–5.2)
ALBUMIN/GLOB SERPL: 1.3 G/DL
ALP SERPL-CCNC: 104 U/L (ref 39–117)
ALT SERPL W P-5'-P-CCNC: 15 U/L (ref 1–33)
ANION GAP SERPL CALCULATED.3IONS-SCNC: 11.4 MMOL/L (ref 5–15)
AST SERPL-CCNC: 19 U/L (ref 1–32)
BASOPHILS # BLD AUTO: 0.03 10*3/MM3 (ref 0–0.2)
BASOPHILS NFR BLD AUTO: 0.5 % (ref 0–1.5)
BILIRUB SERPL-MCNC: 0.5 MG/DL (ref 0–1.2)
BUN SERPL-MCNC: 12 MG/DL (ref 8–23)
BUN/CREAT SERPL: 18.5 (ref 7–25)
CALCIUM SPEC-SCNC: 9.3 MG/DL (ref 8.6–10.5)
CHLORIDE SERPL-SCNC: 104 MMOL/L (ref 98–107)
CHOLEST SERPL-MCNC: 209 MG/DL (ref 0–200)
CO2 SERPL-SCNC: 22.6 MMOL/L (ref 22–29)
CREAT SERPL-MCNC: 0.65 MG/DL (ref 0.57–1)
DEPRECATED RDW RBC AUTO: 41.7 FL (ref 37–54)
EOSINOPHIL # BLD AUTO: 0.15 10*3/MM3 (ref 0–0.4)
EOSINOPHIL NFR BLD AUTO: 2.7 % (ref 0.3–6.2)
ERYTHROCYTE [DISTWIDTH] IN BLOOD BY AUTOMATED COUNT: 12.7 % (ref 12.3–15.4)
GFR SERPL CREATININE-BSD FRML MDRD: 111 ML/MIN/1.73
GLOBULIN UR ELPH-MCNC: 3.4 GM/DL
GLUCOSE SERPL-MCNC: 75 MG/DL (ref 65–99)
HCT VFR BLD AUTO: 38 % (ref 34–46.6)
HDLC SERPL-MCNC: 63 MG/DL (ref 40–60)
HGB BLD-MCNC: 12.7 G/DL (ref 12–15.9)
IMM GRANULOCYTES # BLD AUTO: 0.01 10*3/MM3 (ref 0–0.05)
IMM GRANULOCYTES NFR BLD AUTO: 0.2 % (ref 0–0.5)
LDLC SERPL CALC-MCNC: 135 MG/DL (ref 0–100)
LDLC/HDLC SERPL: 2.14 {RATIO}
LYMPHOCYTES # BLD AUTO: 2.69 10*3/MM3 (ref 0.7–3.1)
LYMPHOCYTES NFR BLD AUTO: 49.2 % (ref 19.6–45.3)
MCH RBC QN AUTO: 30.2 PG (ref 26.6–33)
MCHC RBC AUTO-ENTMCNC: 33.4 G/DL (ref 31.5–35.7)
MCV RBC AUTO: 90.5 FL (ref 79–97)
MONOCYTES # BLD AUTO: 0.49 10*3/MM3 (ref 0.1–0.9)
MONOCYTES NFR BLD AUTO: 9 % (ref 5–12)
NEUTROPHILS NFR BLD AUTO: 2.1 10*3/MM3 (ref 1.7–7)
NEUTROPHILS NFR BLD AUTO: 38.4 % (ref 42.7–76)
NRBC BLD AUTO-RTO: 0 /100 WBC (ref 0–0.2)
PLATELET # BLD AUTO: 243 10*3/MM3 (ref 140–450)
PMV BLD AUTO: 12 FL (ref 6–12)
POTASSIUM SERPL-SCNC: 4 MMOL/L (ref 3.5–5.2)
PROT SERPL-MCNC: 7.8 G/DL (ref 6–8.5)
RBC # BLD AUTO: 4.2 10*6/MM3 (ref 3.77–5.28)
SODIUM SERPL-SCNC: 138 MMOL/L (ref 136–145)
TRIGL SERPL-MCNC: 55 MG/DL (ref 0–150)
TSH SERPL DL<=0.05 MIU/L-ACNC: 1.89 UIU/ML (ref 0.27–4.2)
VIT B12 BLD-MCNC: 521 PG/ML (ref 211–946)
VLDLC SERPL-MCNC: 11 MG/DL (ref 5–40)
WBC # BLD AUTO: 5.47 10*3/MM3 (ref 3.4–10.8)

## 2020-09-10 PROCEDURE — 36415 COLL VENOUS BLD VENIPUNCTURE: CPT | Performed by: NURSE PRACTITIONER

## 2020-09-10 PROCEDURE — 85025 COMPLETE CBC W/AUTO DIFF WBC: CPT | Performed by: NURSE PRACTITIONER

## 2020-09-10 PROCEDURE — 82306 VITAMIN D 25 HYDROXY: CPT | Performed by: NURSE PRACTITIONER

## 2020-09-10 PROCEDURE — 82607 VITAMIN B-12: CPT | Performed by: NURSE PRACTITIONER

## 2020-09-10 PROCEDURE — 80061 LIPID PANEL: CPT | Performed by: NURSE PRACTITIONER

## 2020-09-10 PROCEDURE — 80053 COMPREHEN METABOLIC PANEL: CPT | Performed by: NURSE PRACTITIONER

## 2020-09-10 PROCEDURE — 84443 ASSAY THYROID STIM HORMONE: CPT | Performed by: NURSE PRACTITIONER

## 2020-09-14 ENCOUNTER — OFFICE VISIT (OUTPATIENT)
Dept: ENDOCRINOLOGY | Facility: CLINIC | Age: 65
End: 2020-09-14

## 2020-09-14 VITALS
OXYGEN SATURATION: 100 % | WEIGHT: 274.1 LBS | HEART RATE: 62 BPM | BODY MASS INDEX: 45.67 KG/M2 | SYSTOLIC BLOOD PRESSURE: 136 MMHG | DIASTOLIC BLOOD PRESSURE: 74 MMHG | HEIGHT: 65 IN

## 2020-09-14 DIAGNOSIS — E55.9 VITAMIN D DEFICIENCY: ICD-10-CM

## 2020-09-14 DIAGNOSIS — E78.2 MIXED HYPERLIPIDEMIA: Primary | ICD-10-CM

## 2020-09-14 DIAGNOSIS — E04.1 NONTOXIC THYROID NODULE: ICD-10-CM

## 2020-09-14 DIAGNOSIS — E06.3 HASHIMOTO'S DISEASE: ICD-10-CM

## 2020-09-14 PROCEDURE — 99214 OFFICE O/P EST MOD 30 MIN: CPT | Performed by: NURSE PRACTITIONER

## 2020-09-14 NOTE — PROGRESS NOTES
Subjective    Rosa M Ferguson is a 65 y.o. female. she is here today for follow-up.    History of Present Illness     IN OFFICE VISIT     65 year old female comes for follow up of hypothyroidism diagnosed in 1987.     She was on  brandname synthroid 100 mcgs daily that she takes every morning on an empty stomach.              Lab Results   Component Value Date    TSH 1.890 09/10/2020                   since last appt has lost 3 lbs     In addition she has noticed visible goiter but US is stable     She denies eye pain,  red eye, diplopia, of eyelid edema.              In regards to bone health, she doesn't drink milk . She occasionally eats yogurt.         She has vitamin D Deficiency but hasn't been taking it consistently      She has dyslipidemia atorovastatin--                 The following portions of the patient's history were reviewed and updated as appropriate:   Past Medical History:   Diagnosis Date   • Acquired hypothyroidism    • Astigmatism    • Contact dermatitis due to plants    • Dyslipidemia    • Elevated blood pressure reading without diagnosis of hypertension    • Generalized anxiety disorder    • Goiter    • Hashimoto's thyroiditis    • History of varicose veins    • Hypothyroidism    • Multinodular goiter    • Myopia    • Obesity    • Pain in eye     etiol unknown      • Plantar fasciitis    • Rash    • Thyroid nodule    • Upper respiratory infection    • Urinary tract infectious disease    • Vitamin D deficiency      Past Surgical History:   Procedure Laterality Date   • BLADDER SUSPENSION  2011   • COLONOSCOPY N/A 3/6/2017    Procedure: COLONOSCOPY;  Surgeon: Edis Schaffer MD;  Location: [facility] ENDOSCOPY;  Service:    • CYSTOSCOPY  09/23/1999    Cystoscopy and left retrograde study. Stone passed with uretherocele.   • ENDOSCOPY N/A 10/9/2017    Procedure: ESOPHAGOGASTRODUODENOSCOPY possible dilation ;  Surgeon: Edis Schaffer MD;  Location: [facility] ENDOSCOPY;  Service:    • INJECTION OF  MEDICATION  2016    Kenalog (1)     • TUBAL ABDOMINAL LIGATION  1992    Bilateral partial salpingectomy. Desires sterilization.     Family History   Problem Relation Age of Onset   • Breast cancer Other    • Cancer Other    • Coronary artery disease Other    • Diabetes Other    • Cancer Mother    • Heart disease Father    • Stroke Paternal Uncle    • Breast cancer Maternal Aunt      OB History        3    Para   3    Term   3            AB        Living           SAB        TAB        Ectopic        Molar        Multiple        Live Births                  Current Outpatient Medications   Medication Sig Dispense Refill   • atorvastatin (LIPITOR) 20 MG tablet Take 1 tablet by mouth Daily. 90 tablet 1   • levothyroxine (SYNTHROID, LEVOTHROID) 100 MCG tablet Take 1 tablet by mouth Daily. 90 tablet 3   • pantoprazole (PROTONIX) 40 MG EC tablet Take 1 tablet by mouth Daily. 90 tablet 3   • vitamin D (ERGOCALCIFEROL) 1.25 MG (60013 UT) capsule capsule Take 1 capsule by mouth Every 7 (Seven) Days. 12 capsule 3     No current facility-administered medications for this visit.      No Known Allergies  Social History     Socioeconomic History   • Marital status: Single     Spouse name: Not on file   • Number of children: Not on file   • Years of education: Not on file   • Highest education level: Not on file   Tobacco Use   • Smoking status: Never Smoker   • Smokeless tobacco: Never Used   Substance and Sexual Activity   • Alcohol use: Yes     Comment: socially   • Drug use: No   • Sexual activity: Defer       Review of Systems  Review of Systems   Constitutional: Negative for activity change, appetite change, diaphoresis and fatigue.   HENT: Negative for facial swelling, sneezing, sore throat, tinnitus, trouble swallowing and voice change.    Eyes: Negative for photophobia, pain, discharge, redness, itching and visual disturbance.   Respiratory: Negative for apnea, cough, choking, chest tightness and  "shortness of breath.    Cardiovascular: Negative for chest pain, palpitations and leg swelling.   Gastrointestinal: Negative for abdominal distention, abdominal pain, constipation, diarrhea, nausea and vomiting.   Endocrine: Negative for cold intolerance, heat intolerance, polydipsia, polyphagia and polyuria.   Genitourinary: Negative for difficulty urinating, dysuria, frequency, hematuria and urgency.   Musculoskeletal: Negative for arthralgias, back pain, gait problem, joint swelling, myalgias, neck pain and neck stiffness.   Skin: Negative for color change, pallor, rash and wound.   Neurological: Negative for dizziness, tremors, weakness, light-headedness, numbness and headaches.   Hematological: Negative for adenopathy. Does not bruise/bleed easily.   Psychiatric/Behavioral: Negative for behavioral problems, confusion and sleep disturbance.        Objective    /74   Pulse 62   Ht 165.1 cm (65\")   Wt 124 kg (274 lb 1.6 oz)   LMP 08/22/2008 (Within Months)   SpO2 100%   BMI 45.61 kg/m²   Physical Exam  Constitutional:       General: She is not in acute distress.     Appearance: She is well-developed.   HENT:      Head: Normocephalic and atraumatic.      Right Ear: External ear normal.      Left Ear: External ear normal.      Nose: Nose normal.   Eyes:      Conjunctiva/sclera: Conjunctivae normal.      Pupils: Pupils are equal, round, and reactive to light.   Neck:      Musculoskeletal: Normal range of motion and neck supple.      Thyroid: No thyromegaly.      Trachea: No tracheal deviation.   Cardiovascular:      Rate and Rhythm: Normal rate and regular rhythm.      Heart sounds: Normal heart sounds. No murmur.   Pulmonary:      Effort: Pulmonary effort is normal. No respiratory distress.      Breath sounds: Normal breath sounds. No wheezing.   Abdominal:      General: Bowel sounds are normal.      Palpations: Abdomen is soft.      Tenderness: There is no abdominal tenderness. There is no guarding or " rebound.   Musculoskeletal: Normal range of motion.         General: No tenderness or deformity.   Skin:     General: Skin is warm and dry.      Findings: No rash.   Neurological:      Mental Status: She is alert and oriented to person, place, and time.      Cranial Nerves: No cranial nerve deficit.   Psychiatric:         Behavior: Behavior normal.         Thought Content: Thought content normal.         Judgment: Judgment normal.         Lab Review  Glucose (mg/dL)   Date Value   09/10/2020 75   06/08/2020 93   03/06/2020 82     Sodium (mmol/L)   Date Value   09/10/2020 138   06/08/2020 139   03/06/2020 139     Potassium (mmol/L)   Date Value   09/10/2020 4.0   06/08/2020 3.9   03/06/2020 3.9     Chloride (mmol/L)   Date Value   09/10/2020 104   06/08/2020 106   03/06/2020 102     CO2 (mmol/L)   Date Value   09/10/2020 22.6   06/08/2020 23.5   03/06/2020 23.2     BUN (mg/dL)   Date Value   09/10/2020 12   06/08/2020 17   03/06/2020 13     Creatinine (mg/dL)   Date Value   09/10/2020 0.65   06/08/2020 0.79   03/06/2020 0.83     Triglycerides (mg/dL)   Date Value   09/10/2020 55   06/08/2020 50   12/06/2019 66     LDL Cholesterol  (mg/dL)   Date Value   09/10/2020 135 (H)   06/08/2020 111 (H)   12/06/2019 185 (H)       Assessment/Plan      1. Mixed hyperlipidemia    2. Vitamin D deficiency    3. Hashimoto's disease    4. Nontoxic thyroid nodule    .    Medications prescribed:  Outpatient Encounter Medications as of 9/14/2020   Medication Sig Dispense Refill   • atorvastatin (LIPITOR) 20 MG tablet Take 1 tablet by mouth Daily. 90 tablet 1   • levothyroxine (SYNTHROID, LEVOTHROID) 100 MCG tablet Take 1 tablet by mouth Daily. 90 tablet 3   • pantoprazole (PROTONIX) 40 MG EC tablet Take 1 tablet by mouth Daily. 90 tablet 3   • vitamin D (ERGOCALCIFEROL) 1.25 MG (36699 UT) capsule capsule Take 1 capsule by mouth Every 7 (Seven) Days. 12 capsule 3   • [DISCONTINUED] predniSONE (DELTASONE) 10 MG tablet 4 daily times three  days, 3 daily times three days, 2 daily times three days,1 daily times three days 30 tablet 0     No facility-administered encounter medications on file as of 9/14/2020.        Orders placed during this encounter include:  Orders Placed This Encounter   Procedures   • US Thyroid     Standing Status:   Future     Standing Expiration Date:   9/14/2021     Order Specific Question:   Reason for Exam:     Answer:   thyroid cyst   • Comprehensive Metabolic Panel     Standing Status:   Future     Standing Expiration Date:   9/14/2021   • TSH     Standing Status:   Future     Standing Expiration Date:   9/14/2021   • Vitamin B12     Standing Status:   Future     Standing Expiration Date:   9/14/2021   • Vitamin D 25 Hydroxy     Standing Status:   Future     Standing Expiration Date:   9/14/2021   • Lipid Panel     Standing Status:   Future     Standing Expiration Date:   9/14/2021   • CBC & Differential     Standing Status:   Future     Standing Expiration Date:   9/14/2021     Order Specific Question:   Manual Differential     Answer:   No     Hypothyroidism due to Hashimoto's                  Levothyroxine 100 mcg one daily                    Lab Results   Component Value Date    TSH 1.890 09/10/2020                --------------------------------------------------------------        Nontoxic multinodular goiter      2012  barium swallow and PFT to evaluate for extrinsic compression were normal                    June 2016     Reviewed by Dr. Munoz the left 1.8 cm cystic thyroid nodule did measure 1.5 cm --appears low risk for malignancy repeat in 6 months - scheduled for Jan. 13, 2017--biopsy consistent with underactive thyroid                     Notes Recorded by Harry Person MD on 2/1/2017 at 11:38 PM  Please call patient with results. Let her know that the biopsy was consistent with a diagnosis of underactive thyroid, no malignancy. Her thyroid levels are normal so I suggest we don't change her dose.    Note for Me : FNA states insufficient but her US features are typical of Hashimoto's and the cytology shows lymphocytic infiltration as expected in Hashimoto's so I will not consider it insufficient. There is no need for repeat FNA of the lesion that I targeted since it would only show lymphocytic infiltration. Images clearly document adequate needle placement so mis sampling didn't occur. Pathology should distinguish with expected findings in hashimoto;s vs nondiagnostic.                        Repeat ultrasound Jan. 2018     No changes - stable exam           Imaging         Feb. 2020         THYROID ULTRASOUND     CLINICAL INFORMATION:  Multinodular goiter. Nontoxic thyroid  nodule.     TECHNIQUE:  Sagittal and axial images.        COMPARISON:  Ultrasound February 4, 2019.        FINDINGS:  The right lobe is markedly enlarged 5.5 x 2.5 x 2.7 cm.  Heterogeneity and numerous poorly defined nodules. Increased  vascularity right lobe.      The left lobe is enlarged 6.2 x 2.2 x 3.3 cm. Numerous nodules,  most of which have a similar appearance to prior examination.  Development of some cystic changes in a nodule in the upper pole  on today's examination. Otherwise no significant changes. Diffuse  increased vascularity.     IMPRESSION:  CONCLUSION: Bilateral thyroid enlargement. Multinodular goiter.  Interval development of cystic changes in a nodule in the upper  pole left lobe. Otherwise there are no significant changes.     Electronically signed by:  Isai Nguyen MD  2/13/2020 3:24 PM CST  Workstation: [id number]            repeat in one year      -----------------------------------------------------     Vid D def -            change to OTC 2000 units daily             Component      Latest Ref Rng & Units 9/10/2020   25 Hydroxy, Vitamin D      30.0 - 100.0 ng/ml 43.8     ------------------------------------------------------------------------------------     Dyslipidemia                 lipitor 20 mg qhs      Total  Cholesterol   Date Value Ref Range Status   09/10/2020 209 (H) 0 - 200 mg/dL Final     Triglycerides   Date Value Ref Range Status   09/10/2020 55 0 - 150 mg/dL Final     HDL Cholesterol   Date Value Ref Range Status   09/10/2020 63 (H) 40 - 60 mg/dL Final     LDL Cholesterol    Date Value Ref Range Status   09/10/2020 135 (H) 0 - 100 mg/dL Final                    4. Follow-up: No follow-ups on file.

## 2020-11-23 ENCOUNTER — OFFICE VISIT (OUTPATIENT)
Dept: FAMILY MEDICINE CLINIC | Facility: CLINIC | Age: 65
End: 2020-11-23

## 2020-11-23 VITALS
DIASTOLIC BLOOD PRESSURE: 80 MMHG | WEIGHT: 274.6 LBS | BODY MASS INDEX: 45.75 KG/M2 | HEART RATE: 67 BPM | RESPIRATION RATE: 18 BRPM | OXYGEN SATURATION: 98 % | HEIGHT: 65 IN | SYSTOLIC BLOOD PRESSURE: 142 MMHG

## 2020-11-23 DIAGNOSIS — Z12.39 BREAST SCREENING: ICD-10-CM

## 2020-11-23 DIAGNOSIS — K21.9 GASTROESOPHAGEAL REFLUX DISEASE, UNSPECIFIED WHETHER ESOPHAGITIS PRESENT: ICD-10-CM

## 2020-11-23 DIAGNOSIS — E03.9 HYPOTHYROIDISM, UNSPECIFIED TYPE: Primary | ICD-10-CM

## 2020-11-23 PROCEDURE — 99214 OFFICE O/P EST MOD 30 MIN: CPT | Performed by: FAMILY MEDICINE

## 2020-11-23 NOTE — PROGRESS NOTES
Subjective   Rosa M Ferguson is a 65 y.o. female.   Cc: follow up of chronic medical issues    Hypothyroidism  This is a chronic problem. The current episode started more than 1 year ago. The problem occurs daily. The problem has been gradually improving. Associated symptoms include joint swelling and nausea. Pertinent negatives include no abdominal pain, anorexia, arthralgias, change in bowel habit, chest pain, chills, congestion, coughing, diaphoresis, fatigue, fever, headaches, myalgias, neck pain, numbness, rash, sore throat, swollen glands, urinary symptoms, vertigo, visual change, vomiting or weakness.   Heartburn  She complains of belching and nausea. She reports no abdominal pain, no chest pain, no choking, no coughing, no dysphagia, no early satiety, no heartburn, no hoarse voice, no sore throat, no water brash or no wheezing. Pertinent negatives include no fatigue.   She needs a Mammogram. She is at her base line.    The following portions of the patient's history were reviewed and updated as appropriate: allergies, current medications, past family history, past medical history, past social history, past surgical history and problem list.    Review of Systems   Constitutional: Negative for chills, diaphoresis, fatigue and fever.   HENT: Negative for congestion, hoarse voice and sore throat.    Respiratory: Negative for cough, choking and wheezing.    Cardiovascular: Negative for chest pain.   Gastrointestinal: Positive for nausea. Negative for abdominal pain, anorexia, change in bowel habit, dysphagia, heartburn and vomiting.   Musculoskeletal: Positive for joint swelling. Negative for arthralgias, myalgias and neck pain.   Skin: Negative for rash.   Neurological: Negative for vertigo, weakness, numbness and headaches.       Objective   Physical Exam  Vitals signs reviewed.   Constitutional:       Appearance: Normal appearance.   HENT:      Head: Normocephalic and atraumatic.      Right Ear: Tympanic  "membrane, ear canal and external ear normal.      Left Ear: Tympanic membrane, ear canal and external ear normal.      Nose: Nose normal.      Mouth/Throat:      Mouth: Mucous membranes are moist.      Pharynx: Oropharynx is clear.   Eyes:      Extraocular Movements: Extraocular movements intact.      Conjunctiva/sclera: Conjunctivae normal.      Pupils: Pupils are equal, round, and reactive to light.   Neck:      Musculoskeletal: Normal range of motion and neck supple.   Cardiovascular:      Rate and Rhythm: Normal rate and regular rhythm.      Heart sounds: Normal heart sounds. No murmur. No friction rub. No gallop.    Pulmonary:      Effort: Pulmonary effort is normal. No respiratory distress.      Breath sounds: Normal breath sounds. No stridor. No wheezing, rhonchi or rales.   Chest:      Chest wall: No tenderness.   Abdominal:      General: Abdomen is flat. Bowel sounds are normal. There is no distension.      Tenderness: There is no abdominal tenderness.   Skin:     General: Skin is warm and dry.   Neurological:      Mental Status: She is alert.           Visit Vitals  /80 (BP Location: Left arm, Patient Position: Sitting, Cuff Size: Adult)   Pulse 67   Resp 18   Ht 165.1 cm (65\")   Wt 125 kg (274 lb 9.6 oz)   LMP 08/22/2008 (Within Months)   SpO2 98%   BMI 45.70 kg/m²     Body mass index is 45.7 kg/m².      Assessment/Plan   Diagnoses and all orders for this visit:    1. Hypothyroidism, unspecified type (Primary)  -     Comprehensive metabolic panel; Future  -     Lipid panel; Future  -     CBC w AUTO Differential; Future  -     TSH; Future  -     T4, free; Future    2. Gastroesophageal reflux disease, unspecified whether esophagitis present    3. Breast screening  -     Mammo Screening Bilateral With CAD; Future    Return to the clinic in 3 month/s.  Will contact with results as needed.    "

## 2020-11-30 RX ORDER — ATORVASTATIN CALCIUM 20 MG/1
20 TABLET, FILM COATED ORAL DAILY
Qty: 90 TABLET | Refills: 1 | Status: SHIPPED | OUTPATIENT
Start: 2020-11-30 | End: 2021-05-25 | Stop reason: SDUPTHER

## 2020-12-11 ENCOUNTER — LAB (OUTPATIENT)
Dept: LAB | Facility: HOSPITAL | Age: 65
End: 2020-12-11

## 2020-12-11 DIAGNOSIS — E03.9 HYPOTHYROIDISM, UNSPECIFIED TYPE: ICD-10-CM

## 2020-12-11 LAB
ALBUMIN SERPL-MCNC: 4.3 G/DL (ref 3.5–5.2)
ALBUMIN/GLOB SERPL: 1.3 G/DL
ALP SERPL-CCNC: 110 U/L (ref 39–117)
ALT SERPL W P-5'-P-CCNC: 16 U/L (ref 1–33)
ANION GAP SERPL CALCULATED.3IONS-SCNC: 12.6 MMOL/L (ref 5–15)
AST SERPL-CCNC: 20 U/L (ref 1–32)
BASOPHILS # BLD AUTO: 0.02 10*3/MM3 (ref 0–0.2)
BASOPHILS NFR BLD AUTO: 0.4 % (ref 0–1.5)
BILIRUB SERPL-MCNC: 0.5 MG/DL (ref 0–1.2)
BUN SERPL-MCNC: 15 MG/DL (ref 8–23)
BUN/CREAT SERPL: 20 (ref 7–25)
CALCIUM SPEC-SCNC: 9.6 MG/DL (ref 8.6–10.5)
CHLORIDE SERPL-SCNC: 104 MMOL/L (ref 98–107)
CHOLEST SERPL-MCNC: 211 MG/DL (ref 0–200)
CO2 SERPL-SCNC: 23.4 MMOL/L (ref 22–29)
CREAT SERPL-MCNC: 0.75 MG/DL (ref 0.57–1)
DEPRECATED RDW RBC AUTO: 41.5 FL (ref 37–54)
EOSINOPHIL # BLD AUTO: 0.12 10*3/MM3 (ref 0–0.4)
EOSINOPHIL NFR BLD AUTO: 2.6 % (ref 0.3–6.2)
ERYTHROCYTE [DISTWIDTH] IN BLOOD BY AUTOMATED COUNT: 12.7 % (ref 12.3–15.4)
GFR SERPL CREATININE-BSD FRML MDRD: 94 ML/MIN/1.73
GLOBULIN UR ELPH-MCNC: 3.4 GM/DL
GLUCOSE SERPL-MCNC: 87 MG/DL (ref 65–99)
HCT VFR BLD AUTO: 37.6 % (ref 34–46.6)
HDLC SERPL-MCNC: 69 MG/DL (ref 40–60)
HGB BLD-MCNC: 12.4 G/DL (ref 12–15.9)
IMM GRANULOCYTES # BLD AUTO: 0.01 10*3/MM3 (ref 0–0.05)
IMM GRANULOCYTES NFR BLD AUTO: 0.2 % (ref 0–0.5)
LDLC SERPL CALC-MCNC: 130 MG/DL (ref 0–100)
LDLC/HDLC SERPL: 1.86 {RATIO}
LYMPHOCYTES # BLD AUTO: 2.29 10*3/MM3 (ref 0.7–3.1)
LYMPHOCYTES NFR BLD AUTO: 48.9 % (ref 19.6–45.3)
MCH RBC QN AUTO: 29.5 PG (ref 26.6–33)
MCHC RBC AUTO-ENTMCNC: 33 G/DL (ref 31.5–35.7)
MCV RBC AUTO: 89.5 FL (ref 79–97)
MONOCYTES # BLD AUTO: 0.44 10*3/MM3 (ref 0.1–0.9)
MONOCYTES NFR BLD AUTO: 9.4 % (ref 5–12)
NEUTROPHILS NFR BLD AUTO: 1.8 10*3/MM3 (ref 1.7–7)
NEUTROPHILS NFR BLD AUTO: 38.5 % (ref 42.7–76)
NRBC BLD AUTO-RTO: 0 /100 WBC (ref 0–0.2)
PLATELET # BLD AUTO: 264 10*3/MM3 (ref 140–450)
PMV BLD AUTO: 12.6 FL (ref 6–12)
POTASSIUM SERPL-SCNC: 3.9 MMOL/L (ref 3.5–5.2)
PROT SERPL-MCNC: 7.7 G/DL (ref 6–8.5)
RBC # BLD AUTO: 4.2 10*6/MM3 (ref 3.77–5.28)
SODIUM SERPL-SCNC: 140 MMOL/L (ref 136–145)
T4 FREE SERPL-MCNC: 1.63 NG/DL (ref 0.93–1.7)
TRIGL SERPL-MCNC: 69 MG/DL (ref 0–150)
TSH SERPL DL<=0.05 MIU/L-ACNC: 1.07 UIU/ML (ref 0.27–4.2)
VLDLC SERPL-MCNC: 12 MG/DL (ref 5–40)
WBC # BLD AUTO: 4.68 10*3/MM3 (ref 3.4–10.8)

## 2020-12-11 PROCEDURE — 36415 COLL VENOUS BLD VENIPUNCTURE: CPT

## 2020-12-11 PROCEDURE — 85025 COMPLETE CBC W/AUTO DIFF WBC: CPT

## 2020-12-11 PROCEDURE — 80053 COMPREHEN METABOLIC PANEL: CPT

## 2020-12-11 PROCEDURE — 84439 ASSAY OF FREE THYROXINE: CPT

## 2020-12-11 PROCEDURE — 84443 ASSAY THYROID STIM HORMONE: CPT

## 2020-12-11 PROCEDURE — 80061 LIPID PANEL: CPT

## 2021-02-12 ENCOUNTER — TELEPHONE (OUTPATIENT)
Dept: ENDOCRINOLOGY | Facility: CLINIC | Age: 66
End: 2021-02-12

## 2021-02-12 NOTE — TELEPHONE ENCOUNTER
Pt now has [facility] and medicare (NEW) and they told Pt that we need to send for authorization before she can do the US of her thyroid , may have to changed US and appt until we get  to get approval.     We have insurance in now but not scanned at this time. Please let Pt know if we need to change them   [phone]

## 2021-02-18 ENCOUNTER — HOSPITAL ENCOUNTER (OUTPATIENT)
Dept: ULTRASOUND IMAGING | Facility: HOSPITAL | Age: 66
Discharge: HOME | End: 2021-02-18
Admitting: NURSE PRACTITIONER

## 2021-02-18 DIAGNOSIS — E55.9 VITAMIN D DEFICIENCY: ICD-10-CM

## 2021-02-18 DIAGNOSIS — E78.2 MIXED HYPERLIPIDEMIA: ICD-10-CM

## 2021-02-18 DIAGNOSIS — E06.3 HASHIMOTO'S DISEASE: ICD-10-CM

## 2021-02-18 PROCEDURE — 76536 US EXAM OF HEAD AND NECK: CPT

## 2021-02-23 ENCOUNTER — OFFICE VISIT (OUTPATIENT)
Dept: FAMILY MEDICINE CLINIC | Facility: CLINIC | Age: 66
End: 2021-02-23

## 2021-02-23 VITALS
BODY MASS INDEX: 46.17 KG/M2 | OXYGEN SATURATION: 99 % | HEIGHT: 65 IN | DIASTOLIC BLOOD PRESSURE: 80 MMHG | WEIGHT: 277.13 LBS | HEART RATE: 69 BPM | SYSTOLIC BLOOD PRESSURE: 144 MMHG

## 2021-02-23 DIAGNOSIS — R12 HEARTBURN: ICD-10-CM

## 2021-02-23 DIAGNOSIS — E03.9 HYPOTHYROIDISM, UNSPECIFIED TYPE: ICD-10-CM

## 2021-02-23 DIAGNOSIS — G89.29 CHRONIC PAIN OF RIGHT ANKLE: ICD-10-CM

## 2021-02-23 DIAGNOSIS — E78.2 MIXED HYPERLIPIDEMIA: Primary | ICD-10-CM

## 2021-02-23 DIAGNOSIS — M25.571 CHRONIC PAIN OF RIGHT ANKLE: ICD-10-CM

## 2021-02-23 DIAGNOSIS — M79.671 RIGHT FOOT PAIN: ICD-10-CM

## 2021-02-23 PROCEDURE — 99214 OFFICE O/P EST MOD 30 MIN: CPT | Performed by: FAMILY MEDICINE

## 2021-02-23 RX ORDER — PANTOPRAZOLE SODIUM 40 MG/1
40 TABLET, DELAYED RELEASE ORAL DAILY
Qty: 90 TABLET | Refills: 3 | Status: SHIPPED | OUTPATIENT
Start: 2021-02-23 | End: 2022-03-17

## 2021-02-23 NOTE — PROGRESS NOTES
Subjective   Rosa M Ferguson is a 66 y.o. female.   Cc: follow up of chronic medical issues    Hypothyroidism  This is a chronic problem. The current episode started more than 1 year ago. The problem occurs daily. Associated symptoms include arthralgias and a visual change. Pertinent negatives include no abdominal pain, anorexia, change in bowel habit, chest pain, chills, congestion, coughing, diaphoresis, fatigue, fever, headaches, joint swelling, myalgias, nausea, neck pain, numbness, rash, sore throat, swollen glands, urinary symptoms, vertigo, vomiting or weakness.   Hyperlipidemia  This is a chronic problem. The current episode started more than 1 year ago. Recent lipid tests were reviewed and are variable. Exacerbating diseases include hypothyroidism. Pertinent negatives include no chest pain or myalgias. Current antihyperlipidemic treatment includes statins.   Heartburn  She complains of belching and heartburn. She reports no abdominal pain, no chest pain, no choking, no coughing, no dysphagia, no early satiety, no hoarse voice, no nausea, no sore throat, no stridor, no water brash or no wheezing. Pertinent negatives include no fatigue.   She has been having right foot pain. It is mainly along the right lateral malleolus and the dorsum of the foot.    The following portions of the patient's history were reviewed and updated as appropriate: allergies, current medications, past family history, past medical history, past social history, past surgical history and problem list.    Review of Systems   Constitutional: Negative for chills, diaphoresis, fatigue and fever.   HENT: Negative for congestion, hoarse voice and sore throat.    Respiratory: Negative for cough, choking and wheezing.    Cardiovascular: Negative for chest pain.   Gastrointestinal: Positive for heartburn. Negative for abdominal pain, anorexia, change in bowel habit, dysphagia, nausea and vomiting.   Musculoskeletal: Positive for arthralgias.  "Negative for joint swelling, myalgias and neck pain.   Skin: Negative for rash.   Neurological: Negative for vertigo, weakness, numbness and headaches.       Objective   Physical Exam  Vitals signs reviewed.   Constitutional:       Appearance: Normal appearance.   HENT:      Head: Normocephalic and atraumatic.      Right Ear: Tympanic membrane, ear canal and external ear normal.      Left Ear: Tympanic membrane and ear canal normal.      Nose: Nose normal.      Mouth/Throat:      Mouth: Mucous membranes are moist.      Pharynx: Oropharynx is clear.   Eyes:      Extraocular Movements: Extraocular movements intact.      Pupils: Pupils are equal, round, and reactive to light.   Neck:      Musculoskeletal: Normal range of motion and neck supple.   Cardiovascular:      Rate and Rhythm: Normal rate and regular rhythm.      Heart sounds: Normal heart sounds. No murmur. No friction rub. No gallop.    Pulmonary:      Effort: Pulmonary effort is normal. No respiratory distress.      Breath sounds: No stridor. No wheezing, rhonchi or rales.   Chest:      Chest wall: No tenderness.   Abdominal:      General: Abdomen is flat. Bowel sounds are normal. There is no distension.      Palpations: Abdomen is soft.      Tenderness: There is no abdominal tenderness. There is no guarding.   Musculoskeletal:      Comments: Tender on the dorsum of th4e right foot and the right lateral malleolus.   Skin:     General: Skin is warm and dry.   Neurological:      Mental Status: She is alert.           Visit Vitals  /80   Pulse 69   Ht 165.1 cm (65\")   Wt 126 kg (277 lb 2 oz)   LMP 08/22/2008 (Within Months)   SpO2 99%   Breastfeeding No   BMI 46.12 kg/m²     Body mass index is 46.12 kg/m².      Assessment/Plan   Diagnoses and all orders for this visit:    1. Mixed hyperlipidemia (Primary)  -     Comprehensive metabolic panel; Future  -     Lipid panel; Future  -     CBC w AUTO Differential; Future    2. Hypothyroidism, unspecified type  -  "    T4, free; Future  -     TSH; Future    3. Heartburn    4. Right foot pain  -     XR Foot 3+ View Right; Future    5. Chronic pain of right ankle  -     XR Ankle 3+ View Right; Future    Other orders  -     pantoprazole (PROTONIX) 40 MG EC tablet; Take 1 tablet by mouth Daily.  Dispense: 90 tablet; Refill: 3    Return to the clinic in 3 month/s.  Will contact with results as needed.

## 2021-03-12 ENCOUNTER — LAB (OUTPATIENT)
Dept: LAB | Facility: HOSPITAL | Age: 66
End: 2021-03-12

## 2021-03-12 ENCOUNTER — IMMUNIZATION (OUTPATIENT)
Dept: VACCINE CLINIC | Facility: HOSPITAL | Age: 66
End: 2021-03-12

## 2021-03-12 DIAGNOSIS — E55.9 VITAMIN D DEFICIENCY: ICD-10-CM

## 2021-03-12 DIAGNOSIS — E03.9 HYPOTHYROIDISM, UNSPECIFIED TYPE: ICD-10-CM

## 2021-03-12 DIAGNOSIS — E06.3 HASHIMOTO'S DISEASE: ICD-10-CM

## 2021-03-12 DIAGNOSIS — E78.2 MIXED HYPERLIPIDEMIA: ICD-10-CM

## 2021-03-12 LAB
25(OH)D3 SERPL-MCNC: 35.3 NG/ML
ALBUMIN SERPL-MCNC: 3.9 G/DL (ref 3.5–5.2)
ALBUMIN/GLOB SERPL: 1.1 G/DL
ALP SERPL-CCNC: 111 U/L (ref 39–117)
ALT SERPL W P-5'-P-CCNC: 17 U/L (ref 1–33)
ANION GAP SERPL CALCULATED.3IONS-SCNC: 11.2 MMOL/L (ref 5–15)
AST SERPL-CCNC: 20 U/L (ref 1–32)
BASOPHILS # BLD AUTO: 0.02 10*3/MM3 (ref 0–0.2)
BASOPHILS NFR BLD AUTO: 0.4 % (ref 0–1.5)
BILIRUB SERPL-MCNC: 0.5 MG/DL (ref 0–1.2)
BUN SERPL-MCNC: 13 MG/DL (ref 8–23)
BUN/CREAT SERPL: 18.3 (ref 7–25)
CALCIUM SPEC-SCNC: 9.3 MG/DL (ref 8.6–10.5)
CHLORIDE SERPL-SCNC: 105 MMOL/L (ref 98–107)
CHOLEST SERPL-MCNC: 205 MG/DL (ref 0–200)
CO2 SERPL-SCNC: 22.8 MMOL/L (ref 22–29)
CREAT SERPL-MCNC: 0.71 MG/DL (ref 0.57–1)
DEPRECATED RDW RBC AUTO: 41.3 FL (ref 37–54)
EOSINOPHIL # BLD AUTO: 0.14 10*3/MM3 (ref 0–0.4)
EOSINOPHIL NFR BLD AUTO: 3.1 % (ref 0.3–6.2)
ERYTHROCYTE [DISTWIDTH] IN BLOOD BY AUTOMATED COUNT: 12.8 % (ref 12.3–15.4)
GFR SERPL CREATININE-BSD FRML MDRD: 100 ML/MIN/1.73
GLOBULIN UR ELPH-MCNC: 3.4 GM/DL
GLUCOSE SERPL-MCNC: 83 MG/DL (ref 65–99)
HCT VFR BLD AUTO: 38.4 % (ref 34–46.6)
HDLC SERPL-MCNC: 68 MG/DL (ref 40–60)
HGB BLD-MCNC: 12.7 G/DL (ref 12–15.9)
IMM GRANULOCYTES # BLD AUTO: 0.01 10*3/MM3 (ref 0–0.05)
IMM GRANULOCYTES NFR BLD AUTO: 0.2 % (ref 0–0.5)
LDLC SERPL CALC-MCNC: 127 MG/DL (ref 0–100)
LDLC/HDLC SERPL: 1.85 {RATIO}
LYMPHOCYTES # BLD AUTO: 2.35 10*3/MM3 (ref 0.7–3.1)
LYMPHOCYTES NFR BLD AUTO: 51.3 % (ref 19.6–45.3)
MCH RBC QN AUTO: 29.9 PG (ref 26.6–33)
MCHC RBC AUTO-ENTMCNC: 33.1 G/DL (ref 31.5–35.7)
MCV RBC AUTO: 90.4 FL (ref 79–97)
MONOCYTES # BLD AUTO: 0.4 10*3/MM3 (ref 0.1–0.9)
MONOCYTES NFR BLD AUTO: 8.7 % (ref 5–12)
NEUTROPHILS NFR BLD AUTO: 1.66 10*3/MM3 (ref 1.7–7)
NEUTROPHILS NFR BLD AUTO: 36.3 % (ref 42.7–76)
NRBC BLD AUTO-RTO: 0 /100 WBC (ref 0–0.2)
PLATELET # BLD AUTO: 231 10*3/MM3 (ref 140–450)
PMV BLD AUTO: 12 FL (ref 6–12)
POTASSIUM SERPL-SCNC: 4.1 MMOL/L (ref 3.5–5.2)
PROT SERPL-MCNC: 7.3 G/DL (ref 6–8.5)
RBC # BLD AUTO: 4.25 10*6/MM3 (ref 3.77–5.28)
SODIUM SERPL-SCNC: 139 MMOL/L (ref 136–145)
T4 FREE SERPL-MCNC: 1.53 NG/DL (ref 0.93–1.7)
TRIGL SERPL-MCNC: 57 MG/DL (ref 0–150)
TSH SERPL DL<=0.05 MIU/L-ACNC: 1.28 UIU/ML (ref 0.27–4.2)
VIT B12 BLD-MCNC: 393 PG/ML (ref 211–946)
VLDLC SERPL-MCNC: 10 MG/DL (ref 5–40)
WBC # BLD AUTO: 4.58 10*3/MM3 (ref 3.4–10.8)

## 2021-03-12 PROCEDURE — 36415 COLL VENOUS BLD VENIPUNCTURE: CPT

## 2021-03-12 PROCEDURE — 0001A: CPT | Performed by: THORACIC SURGERY (CARDIOTHORACIC VASCULAR SURGERY)

## 2021-03-12 PROCEDURE — 84443 ASSAY THYROID STIM HORMONE: CPT

## 2021-03-12 PROCEDURE — 82306 VITAMIN D 25 HYDROXY: CPT

## 2021-03-12 PROCEDURE — 80053 COMPREHEN METABOLIC PANEL: CPT

## 2021-03-12 PROCEDURE — 85025 COMPLETE CBC W/AUTO DIFF WBC: CPT

## 2021-03-12 PROCEDURE — 91300 HC SARSCOV02 VAC 30MCG/0.3ML IM: CPT | Performed by: THORACIC SURGERY (CARDIOTHORACIC VASCULAR SURGERY)

## 2021-03-12 PROCEDURE — 80061 LIPID PANEL: CPT

## 2021-03-12 PROCEDURE — 82607 VITAMIN B-12: CPT

## 2021-03-12 PROCEDURE — 84439 ASSAY OF FREE THYROXINE: CPT

## 2021-03-16 ENCOUNTER — TELEPHONE (OUTPATIENT)
Dept: FAMILY MEDICINE CLINIC | Facility: CLINIC | Age: 66
End: 2021-03-16

## 2021-03-19 ENCOUNTER — OFFICE VISIT (OUTPATIENT)
Dept: ENDOCRINOLOGY | Facility: CLINIC | Age: 66
End: 2021-03-19

## 2021-03-19 VITALS
SYSTOLIC BLOOD PRESSURE: 146 MMHG | OXYGEN SATURATION: 99 % | BODY MASS INDEX: 41.23 KG/M2 | HEART RATE: 68 BPM | DIASTOLIC BLOOD PRESSURE: 88 MMHG | HEIGHT: 69 IN | WEIGHT: 278.4 LBS

## 2021-03-19 DIAGNOSIS — E06.3 HASHIMOTO'S DISEASE: ICD-10-CM

## 2021-03-19 DIAGNOSIS — E55.9 VITAMIN D DEFICIENCY: ICD-10-CM

## 2021-03-19 DIAGNOSIS — E78.2 MIXED HYPERLIPIDEMIA: ICD-10-CM

## 2021-03-19 DIAGNOSIS — E04.1 NONTOXIC THYROID NODULE: Primary | ICD-10-CM

## 2021-03-19 PROCEDURE — 99214 OFFICE O/P EST MOD 30 MIN: CPT | Performed by: NURSE PRACTITIONER

## 2021-03-19 RX ORDER — ERGOCALCIFEROL 1.25 MG/1
50000 CAPSULE ORAL
Qty: 12 CAPSULE | Refills: 3 | Status: SHIPPED | OUTPATIENT
Start: 2021-03-19 | End: 2021-05-28 | Stop reason: SDUPTHER

## 2021-03-19 RX ORDER — LEVOTHYROXINE SODIUM 0.1 MG/1
100 TABLET ORAL DAILY
Qty: 90 TABLET | Refills: 3 | Status: SHIPPED | OUTPATIENT
Start: 2021-03-19 | End: 2022-03-17 | Stop reason: SDUPTHER

## 2021-03-19 NOTE — PROGRESS NOTES
"Chief Complaint  Hashimoto's Thyroiditis    Subjective          Rosa M Ferguson presents to [facility] ENDOCRINOLOGY  History of Present Illness     IN OFFICE VISIT         66-year-old female presents for follow-up    Reason hypothyroidism    Duration diagnosed in 1987    Timing is constant    Quality controlled    Severity is moderate    Alleviating factors compliance with brand name Synthroid             Lab Results   Component Value Date    TSH 1.280 03/12/2021           Goiter    It is stable denies any type of dysphagia compression symptoms    Also has a history of vitamin D deficiency    Misses doses    And hyperlipidemia    Taking a statin     -               Objective   Vital Signs:   /88   Pulse 68   Ht 175.3 cm (69\")   Wt 126 kg (278 lb 6.4 oz)   SpO2 99%   BMI 41.11 kg/m²     Physical Exam  Constitutional:       Appearance: Normal appearance.   HENT:      Head: Normocephalic.      Nose: Nose normal.   Eyes:      Conjunctiva/sclera: Conjunctivae normal.      Pupils: Pupils are equal, round, and reactive to light.   Cardiovascular:      Rate and Rhythm: Regular rhythm.      Heart sounds: Normal heart sounds.   Pulmonary:      Breath sounds: Normal breath sounds.   Musculoskeletal:         General: Normal range of motion.      Cervical back: Normal range of motion.   Skin:     Coloration: Skin is not pale.   Neurological:      General: No focal deficit present.      Mental Status: She is alert.   Psychiatric:         Mood and Affect: Mood normal.         Thought Content: Thought content normal.         Judgment: Judgment normal.        Result Review :   The following data was reviewed by: RACHEL Zabala on 03/19/2021:  Common labs    Common Labsle 9/10/20 9/10/20 9/10/20 12/11/20 12/11/20 12/11/20 3/12/21 3/12/21 3/12/21    1253 1253 1253 0946 0946 0946 1030 1030 1030   Glucose  75   87   83    BUN  12   15   13    Creatinine  0.65   0.75   0.71    eGFR  Am  " 111   94   100    Sodium  138   140   139    Potassium  4.0   3.9   4.1    Chloride  104   104   105    Calcium  9.3   9.6   9.3    Albumin  4.40   4.30   3.90    Total Bilirubin  0.5   0.5   0.5    Alkaline Phosphatase  104   110   111    AST (SGOT)  19   20   20    ALT (SGPT)  15   16   17    WBC 5.47   4.68   4.58     Hemoglobin 12.7   12.4   12.7     Hematocrit 38.0   37.6   38.4     Platelets 243   264   231     Total Cholesterol   209 (A)   211 (A)   205 (A)   Triglycerides   55   69   57   HDL Cholesterol   63 (A)   69 (A)   68 (A)   LDL Cholesterol    135 (A)   130 (A)   127 (A)   (A) Abnormal value                      Assessment and Plan    Diagnoses and all orders for this visit:    1. Nontoxic thyroid nodule (Primary)  -     CBC & Differential; Future  -     Comprehensive Metabolic Panel; Future  -     Lipid Panel; Future  -     TSH; Future  -     Vitamin B12; Future  -     Vitamin D 25 Hydroxy; Future    2. Hashimoto's disease  -     CBC & Differential; Future  -     Comprehensive Metabolic Panel; Future  -     Lipid Panel; Future  -     TSH; Future  -     Vitamin B12; Future  -     Vitamin D 25 Hydroxy; Future    3. Mixed hyperlipidemia  -     CBC & Differential; Future  -     Comprehensive Metabolic Panel; Future  -     Lipid Panel; Future  -     TSH; Future  -     Vitamin B12; Future  -     Vitamin D 25 Hydroxy; Future    4. Vitamin D deficiency  -     CBC & Differential; Future  -     Comprehensive Metabolic Panel; Future  -     Lipid Panel; Future  -     TSH; Future  -     Vitamin B12; Future  -     Vitamin D 25 Hydroxy; Future    Other orders  -     levothyroxine (SYNTHROID, LEVOTHROID) 100 MCG tablet; Take 1 tablet by mouth Daily.  Dispense: 90 tablet; Refill: 3  -     vitamin D (ERGOCALCIFEROL) 1.25 MG (36768 UT) capsule capsule; Take 1 capsule by mouth Every 7 (Seven) Days.  Dispense: 12 capsule; Refill: 3                 Hypothyroidism due to Hashimoto's                  Taking Levothyroxine  100 mcg one daily               Lab Results   Component Value Date    TSH 1.280 03/12/2021              --------------------------------------------------------------        Nontoxic multinodular goiter      2012  barium swallow and PFT to evaluate for extrinsic compression were normal                    June 2016     Reviewed by Dr. Munoz the left 1.8 cm cystic thyroid nodule did measure 1.5 cm --appears low risk for malignancy repeat in 6 months - scheduled for Jan. 13, 2017--biopsy consistent with underactive thyroid                     Notes Recorded by Harry Person MD on 2/1/2017 at 11:38 PM  Please call patient with results. Let her know that the biopsy was consistent with a diagnosis of underactive thyroid, no malignancy. Her thyroid levels are normal so I suggest we don't change her dose.   Note for Me : FNA states insufficient but her US features are typical of Hashimoto's and the cytology shows lymphocytic infiltration as expected in Hashimoto's so I will not consider it insufficient. There is no need for repeat FNA of the lesion that I targeted since it would only show lymphocytic infiltration. Images clearly document adequate needle placement so mis sampling didn't occur. Pathology should distinguish with expected findings in hashimoto;s vs nondiagnostic.                        Repeat ultrasound Jan. 2018             Imaging         Feb. 2020         THYROID ULTRASOUND     CLINICAL INFORMATION:  Multinodular goiter. Nontoxic thyroid  nodule.     TECHNIQUE:  Sagittal and axial images.        COMPARISON:  Ultrasound February 4, 2019.        FINDINGS:  The right lobe is markedly enlarged 5.5 x 2.5 x 2.7 cm.  Heterogeneity and numerous poorly defined nodules. Increased  vascularity right lobe.      The left lobe is enlarged 6.2 x 2.2 x 3.3 cm. Numerous nodules,  most of which have a similar appearance to prior examination.  Development of some cystic changes in a nodule in the upper pole  on  today's examination. Otherwise no significant changes. Diffuse  increased vascularity.     IMPRESSION:  CONCLUSION: Bilateral thyroid enlargement. Multinodular goiter.  Interval development of cystic changes in a nodule in the upper  pole left lobe. Otherwise there are no significant changes.     Electronically signed by:  Isai Nguyen MD  2/13/2020 3:24 PM CST  Workstation: [id number]        Personally reviewed thyroid ultrasound and left inferior nodule is stable in size, previously 1.8 now 1.9      Repeat 2021      -----------------------------------------------------     Vid D def -            change to OTC 2000 units daily        Component      Latest Ref Rng & Units 3/12/2021   25 Hydroxy, Vitamin D      ng/ml 35.3        ------------------------------------------------------------------------------------     Dyslipidemia                 lipitor 20 mg qhs        Total Cholesterol   Date Value Ref Range Status   03/12/2021 205 (H) 0 - 200 mg/dL Final     Triglycerides   Date Value Ref Range Status   03/12/2021 57 0 - 150 mg/dL Final     HDL Cholesterol   Date Value Ref Range Status   03/12/2021 68 (H) 40 - 60 mg/dL Final     LDL Cholesterol    Date Value Ref Range Status   03/12/2021 127 (H) 0 - 100 mg/dL Final            Follow Up   Return in about 6 months (around 9/19/2021).  Patient was given instructions and counseling regarding her condition or for health maintenance advice. Please see specific information pulled into the AVS if appropriate.

## 2021-04-02 ENCOUNTER — IMMUNIZATION (OUTPATIENT)
Dept: VACCINE CLINIC | Facility: HOSPITAL | Age: 66
End: 2021-04-02

## 2021-04-02 PROCEDURE — 91300 HC SARSCOV02 VAC 30MCG/0.3ML IM: CPT | Performed by: NURSE PRACTITIONER

## 2021-04-02 PROCEDURE — 0002A: CPT | Performed by: NURSE PRACTITIONER

## 2021-05-25 ENCOUNTER — OFFICE VISIT (OUTPATIENT)
Dept: FAMILY MEDICINE CLINIC | Facility: CLINIC | Age: 66
End: 2021-05-25

## 2021-05-25 ENCOUNTER — LAB (OUTPATIENT)
Dept: LAB | Facility: HOSPITAL | Age: 66
End: 2021-05-25

## 2021-05-25 VITALS
SYSTOLIC BLOOD PRESSURE: 126 MMHG | OXYGEN SATURATION: 98 % | HEIGHT: 69 IN | WEIGHT: 279.5 LBS | BODY MASS INDEX: 41.4 KG/M2 | HEART RATE: 58 BPM | DIASTOLIC BLOOD PRESSURE: 72 MMHG

## 2021-05-25 DIAGNOSIS — E03.9 HYPOTHYROIDISM, UNSPECIFIED TYPE: ICD-10-CM

## 2021-05-25 DIAGNOSIS — E78.2 MIXED HYPERLIPIDEMIA: ICD-10-CM

## 2021-05-25 DIAGNOSIS — E78.2 MIXED HYPERLIPIDEMIA: Primary | ICD-10-CM

## 2021-05-25 LAB
ALBUMIN SERPL-MCNC: 4.2 G/DL (ref 3.5–5.2)
ALBUMIN/GLOB SERPL: 1.1 G/DL
ALP SERPL-CCNC: 111 U/L (ref 39–117)
ALT SERPL W P-5'-P-CCNC: 17 U/L (ref 1–33)
ANION GAP SERPL CALCULATED.3IONS-SCNC: 10.4 MMOL/L (ref 5–15)
AST SERPL-CCNC: 17 U/L (ref 1–32)
BASOPHILS # BLD MANUAL: 0.05 10*3/MM3 (ref 0–0.2)
BASOPHILS NFR BLD AUTO: 1 % (ref 0–1.5)
BILIRUB SERPL-MCNC: 0.6 MG/DL (ref 0–1.2)
BUN SERPL-MCNC: 14 MG/DL (ref 8–23)
BUN/CREAT SERPL: 18.9 (ref 7–25)
CALCIUM SPEC-SCNC: 9.8 MG/DL (ref 8.6–10.5)
CHLORIDE SERPL-SCNC: 104 MMOL/L (ref 98–107)
CHOLEST SERPL-MCNC: 204 MG/DL (ref 0–200)
CO2 SERPL-SCNC: 22.6 MMOL/L (ref 22–29)
CREAT SERPL-MCNC: 0.74 MG/DL (ref 0.57–1)
DEPRECATED RDW RBC AUTO: 41.8 FL (ref 37–54)
ERYTHROCYTE [DISTWIDTH] IN BLOOD BY AUTOMATED COUNT: 12.7 % (ref 12.3–15.4)
GFR SERPL CREATININE-BSD FRML MDRD: 95 ML/MIN/1.73
GLOBULIN UR ELPH-MCNC: 3.8 GM/DL
GLUCOSE SERPL-MCNC: 93 MG/DL (ref 65–99)
HCT VFR BLD AUTO: 40.1 % (ref 34–46.6)
HDLC SERPL-MCNC: 64 MG/DL (ref 40–60)
HGB BLD-MCNC: 13.4 G/DL (ref 12–15.9)
LDLC SERPL CALC-MCNC: 123 MG/DL (ref 0–100)
LDLC/HDLC SERPL: 1.89 {RATIO}
LYMPHOCYTES # BLD MANUAL: 2.18 10*3/MM3 (ref 0.7–3.1)
LYMPHOCYTES NFR BLD MANUAL: 14.3 % (ref 5–12)
LYMPHOCYTES NFR BLD MANUAL: 46.9 % (ref 19.6–45.3)
MCH RBC QN AUTO: 30.2 PG (ref 26.6–33)
MCHC RBC AUTO-ENTMCNC: 33.4 G/DL (ref 31.5–35.7)
MCV RBC AUTO: 90.5 FL (ref 79–97)
MONOCYTES # BLD AUTO: 0.66 10*3/MM3 (ref 0.1–0.9)
NEUTROPHILS # BLD AUTO: 1.75 10*3/MM3 (ref 1.7–7)
NEUTROPHILS NFR BLD MANUAL: 37.8 % (ref 42.7–76)
NRBC BLD AUTO-RTO: 0 /100 WBC (ref 0–0.2)
PLAT MORPH BLD: NORMAL
PLATELET # BLD AUTO: 232 10*3/MM3 (ref 140–450)
PMV BLD AUTO: 12.6 FL (ref 6–12)
POTASSIUM SERPL-SCNC: 4.1 MMOL/L (ref 3.5–5.2)
PROT SERPL-MCNC: 8 G/DL (ref 6–8.5)
RBC # BLD AUTO: 4.43 10*6/MM3 (ref 3.77–5.28)
RBC MORPH BLD: NORMAL
SODIUM SERPL-SCNC: 137 MMOL/L (ref 136–145)
T4 FREE SERPL-MCNC: 1.54 NG/DL (ref 0.93–1.7)
TRIGL SERPL-MCNC: 95 MG/DL (ref 0–150)
TSH SERPL DL<=0.05 MIU/L-ACNC: 1.61 UIU/ML (ref 0.27–4.2)
VLDLC SERPL-MCNC: 17 MG/DL (ref 5–40)
WBC # BLD AUTO: 4.64 10*3/MM3 (ref 3.4–10.8)
WBC MORPH BLD: NORMAL

## 2021-05-25 PROCEDURE — 84443 ASSAY THYROID STIM HORMONE: CPT

## 2021-05-25 PROCEDURE — 85025 COMPLETE CBC W/AUTO DIFF WBC: CPT

## 2021-05-25 PROCEDURE — 36415 COLL VENOUS BLD VENIPUNCTURE: CPT

## 2021-05-25 PROCEDURE — 80061 LIPID PANEL: CPT

## 2021-05-25 PROCEDURE — 85007 BL SMEAR W/DIFF WBC COUNT: CPT

## 2021-05-25 PROCEDURE — 99214 OFFICE O/P EST MOD 30 MIN: CPT | Performed by: FAMILY MEDICINE

## 2021-05-25 PROCEDURE — 84439 ASSAY OF FREE THYROXINE: CPT

## 2021-05-25 PROCEDURE — 80053 COMPREHEN METABOLIC PANEL: CPT

## 2021-05-25 RX ORDER — ATORVASTATIN CALCIUM 20 MG/1
20 TABLET, FILM COATED ORAL DAILY
Qty: 90 TABLET | Refills: 2 | Status: SHIPPED | OUTPATIENT
Start: 2021-05-25 | End: 2021-08-26 | Stop reason: SDUPTHER

## 2021-05-25 NOTE — PROGRESS NOTES
Subjective   Rosa M Ferguson is a 66 y.o. female.     Hypothyroidism  This is a chronic problem. The current episode started more than 1 year ago. The problem occurs daily. Associated symptoms include joint swelling. Pertinent negatives include no abdominal pain, anorexia, arthralgias, change in bowel habit, chest pain, chills, congestion, coughing, diaphoresis, fatigue, fever, headaches, myalgias, nausea, neck pain, numbness, rash, sore throat, swollen glands, urinary symptoms, vertigo, visual change, vomiting or weakness.   Hyperlipidemia  This is a chronic problem. The current episode started more than 1 year ago. Exacerbating diseases include hypothyroidism. She has no history of chronic renal disease, diabetes or liver disease. Pertinent negatives include no chest pain, focal sensory loss, focal weakness, leg pain, myalgias or shortness of breath. Current antihyperlipidemic treatment includes statins.       The following portions of the patient's history were reviewed and updated as appropriate: allergies, current medications, past family history, past medical history, past social history, past surgical history and problem list.\    Review of Systems   Constitutional: Negative for chills, diaphoresis, fatigue and fever.   HENT: Negative for congestion and sore throat.    Respiratory: Negative for cough and shortness of breath.    Cardiovascular: Negative for chest pain.   Gastrointestinal: Negative for abdominal pain, anorexia, change in bowel habit, nausea and vomiting.   Musculoskeletal: Positive for joint swelling. Negative for arthralgias, myalgias and neck pain.   Skin: Negative for rash.   Neurological: Negative for vertigo, focal weakness, weakness, numbness and headaches.       Objective   Physical Exam  Vitals reviewed.   Constitutional:       Appearance: Normal appearance.   HENT:      Head: Normocephalic and atraumatic.      Right Ear: Tympanic membrane, ear canal and external ear normal.      Left  "Ear: Tympanic membrane, ear canal and external ear normal.      Nose: Nose normal.      Mouth/Throat:      Mouth: Mucous membranes are moist.      Pharynx: Oropharynx is clear.   Cardiovascular:      Rate and Rhythm: Normal rate.      Heart sounds: Normal heart sounds. No murmur heard.   No friction rub. No gallop.    Pulmonary:      Effort: Pulmonary effort is normal. No respiratory distress.      Breath sounds: Normal breath sounds. No stridor. No wheezing, rhonchi or rales.   Chest:      Chest wall: No tenderness.   Abdominal:      General: Abdomen is flat. Bowel sounds are normal. There is no distension.      Palpations: Abdomen is soft.      Tenderness: There is no abdominal tenderness.   Musculoskeletal:      Cervical back: Normal range of motion and neck supple.   Skin:     General: Skin is warm and dry.   Neurological:      Mental Status: She is alert.           Visit Vitals  /72   Pulse 58   Ht 175.3 cm (69\")   Wt 127 kg (279 lb 8 oz)   LMP 08/22/2008 (Within Months)   SpO2 98%   Breastfeeding No   BMI 41.27 kg/m²     Body mass index is 41.27 kg/m².      Assessment/Plan   Diagnoses and all orders for this visit:    1. Mixed hyperlipidemia (Primary)  -     atorvastatin (LIPITOR) 20 MG tablet; Take 1 tablet by mouth Daily.  Dispense: 90 tablet; Refill: 2  -     Comprehensive metabolic panel; Future  -     CBC w AUTO Differential; Future  -     Lipid panel; Future    2. Hypothyroidism, unspecified type  -     Comprehensive metabolic panel; Future  -     CBC w AUTO Differential; Future  -     TSH; Future  -     T4, free; Future    Return to the clinic in 3 month/s.  Will contact with results as needed.    "

## 2021-05-28 RX ORDER — ERGOCALCIFEROL 1.25 MG/1
50000 CAPSULE ORAL
Qty: 12 CAPSULE | Refills: 3 | Status: SHIPPED | OUTPATIENT
Start: 2021-05-28 | End: 2022-03-17 | Stop reason: SDUPTHER

## 2021-08-26 ENCOUNTER — OFFICE VISIT (OUTPATIENT)
Dept: FAMILY MEDICINE CLINIC | Facility: CLINIC | Age: 66
End: 2021-08-26

## 2021-08-26 ENCOUNTER — LAB (OUTPATIENT)
Dept: LAB | Facility: HOSPITAL | Age: 66
End: 2021-08-26

## 2021-08-26 VITALS
DIASTOLIC BLOOD PRESSURE: 80 MMHG | HEIGHT: 69 IN | HEART RATE: 64 BPM | WEIGHT: 276 LBS | SYSTOLIC BLOOD PRESSURE: 144 MMHG | BODY MASS INDEX: 40.88 KG/M2 | OXYGEN SATURATION: 98 %

## 2021-08-26 DIAGNOSIS — E78.2 MIXED HYPERLIPIDEMIA: ICD-10-CM

## 2021-08-26 DIAGNOSIS — E03.9 HYPOTHYROIDISM, UNSPECIFIED TYPE: ICD-10-CM

## 2021-08-26 DIAGNOSIS — E04.1 NONTOXIC THYROID NODULE: ICD-10-CM

## 2021-08-26 DIAGNOSIS — E03.9 HYPOTHYROIDISM, UNSPECIFIED TYPE: Primary | ICD-10-CM

## 2021-08-26 DIAGNOSIS — K21.9 GASTROESOPHAGEAL REFLUX DISEASE, UNSPECIFIED WHETHER ESOPHAGITIS PRESENT: ICD-10-CM

## 2021-08-26 DIAGNOSIS — E55.9 VITAMIN D DEFICIENCY: ICD-10-CM

## 2021-08-26 DIAGNOSIS — E06.3 HASHIMOTO'S DISEASE: ICD-10-CM

## 2021-08-26 PROCEDURE — 85025 COMPLETE CBC W/AUTO DIFF WBC: CPT

## 2021-08-26 PROCEDURE — 84439 ASSAY OF FREE THYROXINE: CPT

## 2021-08-26 PROCEDURE — 36415 COLL VENOUS BLD VENIPUNCTURE: CPT

## 2021-08-26 PROCEDURE — 82306 VITAMIN D 25 HYDROXY: CPT

## 2021-08-26 PROCEDURE — 82607 VITAMIN B-12: CPT

## 2021-08-26 PROCEDURE — 80061 LIPID PANEL: CPT

## 2021-08-26 PROCEDURE — 84443 ASSAY THYROID STIM HORMONE: CPT

## 2021-08-26 PROCEDURE — 99214 OFFICE O/P EST MOD 30 MIN: CPT | Performed by: FAMILY MEDICINE

## 2021-08-26 PROCEDURE — 80053 COMPREHEN METABOLIC PANEL: CPT

## 2021-08-26 RX ORDER — ATORVASTATIN CALCIUM 20 MG/1
20 TABLET, FILM COATED ORAL DAILY
Qty: 90 TABLET | Refills: 2 | Status: SHIPPED | OUTPATIENT
Start: 2021-08-26 | End: 2022-03-17

## 2021-08-26 NOTE — PROGRESS NOTES
Subjective   Rosa M Ferguson is a 66 y.o. female.   Cc: follow up of chronic medical issues    Hypothyroidism  This is a chronic problem. The current episode started more than 1 year ago. The problem occurs daily. Pertinent negatives include no abdominal pain, anorexia, arthralgias, change in bowel habit, chest pain, chills, congestion, coughing, diaphoresis, fatigue, fever, headaches, joint swelling, myalgias, nausea, neck pain, numbness, rash, sore throat, swollen glands, urinary symptoms, vertigo, visual change, vomiting or weakness.   Hyperlipidemia  This is a chronic problem. The current episode started more than 1 year ago. Exacerbating diseases include hypothyroidism. Pertinent negatives include no chest pain or myalgias. Current antihyperlipidemic treatment includes statins.   Heartburn  She complains of water brash. She reports no abdominal pain, no chest pain, no coughing, no dysphagia, no early satiety, no heartburn, no nausea or no sore throat. Pertinent negatives include no fatigue.       The following portions of the patient's history were reviewed and updated as appropriate: allergies, current medications, past family history, past medical history, past social history, past surgical history and problem list.    Review of Systems   Constitutional: Negative for chills, diaphoresis, fatigue and fever.   HENT: Negative for congestion and sore throat.    Respiratory: Negative for cough.    Cardiovascular: Negative for chest pain.   Gastrointestinal: Negative for abdominal pain, anorexia, change in bowel habit, dysphagia, heartburn, nausea and vomiting.   Musculoskeletal: Negative for arthralgias, joint swelling, myalgias and neck pain.   Skin: Negative for rash.   Neurological: Negative for vertigo, weakness, numbness and headaches.       Objective   Physical Exam  Vitals reviewed.   Constitutional:       Appearance: Normal appearance.   HENT:      Head: Normocephalic and atraumatic.      Right Ear:  "Tympanic membrane, ear canal and external ear normal.      Left Ear: Tympanic membrane, ear canal and external ear normal.      Nose: Nose normal.      Mouth/Throat:      Mouth: Mucous membranes are moist.      Pharynx: Oropharynx is clear.   Cardiovascular:      Rate and Rhythm: Normal rate and regular rhythm.      Heart sounds: Normal heart sounds. No murmur heard.   No friction rub. No gallop.    Pulmonary:      Effort: Pulmonary effort is normal. No respiratory distress.      Breath sounds: Normal breath sounds. No stridor. No wheezing, rhonchi or rales.   Chest:      Chest wall: No tenderness.   Abdominal:      General: Abdomen is flat. Bowel sounds are normal. There is no distension.      Palpations: Abdomen is soft. There is no mass.      Tenderness: There is no abdominal tenderness. There is no guarding or rebound.      Hernia: No hernia is present.   Musculoskeletal:      Cervical back: Normal range of motion.      Comments: Mild swelling on the ankles.   Skin:     General: Skin is warm and dry.   Neurological:      Mental Status: She is alert.           Visit Vitals  /80   Pulse 64   Ht 175.3 cm (69\")   Wt 125 kg (276 lb)   LMP 08/22/2008 (Within Months)   SpO2 98%   Breastfeeding No   BMI 40.76 kg/m²     Body mass index is 40.76 kg/m².      Assessment/Plan   Diagnoses and all orders for this visit:    1. Hypothyroidism, unspecified type (Primary)  -     T4, free; Future  -     TSH; Future    2. Mixed hyperlipidemia  -     atorvastatin (LIPITOR) 20 MG tablet; Take 1 tablet by mouth Daily.  Dispense: 90 tablet; Refill: 2  -     Lipid panel; Future  -     CBC w AUTO Differential; Future  -     Comprehensive metabolic panel; Future    3. Gastroesophageal reflux disease, unspecified whether esophagitis present    Continue on Medication for reflux.  Return to the clinic in 3 month/s.  Will contact with results as needed.    "

## 2021-08-27 LAB
25(OH)D3 SERPL-MCNC: 28.4 NG/ML (ref 30–100)
ALBUMIN SERPL-MCNC: 4.4 G/DL (ref 3.5–5.2)
ALBUMIN/GLOB SERPL: 1.3 G/DL
ALP SERPL-CCNC: 101 U/L (ref 39–117)
ALT SERPL W P-5'-P-CCNC: 18 U/L (ref 1–33)
ANION GAP SERPL CALCULATED.3IONS-SCNC: 11.6 MMOL/L (ref 5–15)
AST SERPL-CCNC: 20 U/L (ref 1–32)
BASOPHILS # BLD AUTO: 0.02 10*3/MM3 (ref 0–0.2)
BASOPHILS NFR BLD AUTO: 0.4 % (ref 0–1.5)
BILIRUB SERPL-MCNC: 0.4 MG/DL (ref 0–1.2)
BUN SERPL-MCNC: 13 MG/DL (ref 8–23)
BUN/CREAT SERPL: 18.1 (ref 7–25)
CALCIUM SPEC-SCNC: 9.4 MG/DL (ref 8.6–10.5)
CHLORIDE SERPL-SCNC: 108 MMOL/L (ref 98–107)
CHOLEST SERPL-MCNC: 208 MG/DL (ref 0–200)
CO2 SERPL-SCNC: 23.4 MMOL/L (ref 22–29)
CREAT SERPL-MCNC: 0.72 MG/DL (ref 0.57–1)
DEPRECATED RDW RBC AUTO: 43.7 FL (ref 37–54)
EOSINOPHIL # BLD AUTO: 0.17 10*3/MM3 (ref 0–0.4)
EOSINOPHIL NFR BLD AUTO: 3.6 % (ref 0.3–6.2)
ERYTHROCYTE [DISTWIDTH] IN BLOOD BY AUTOMATED COUNT: 12.9 % (ref 12.3–15.4)
GFR SERPL CREATININE-BSD FRML MDRD: 99 ML/MIN/1.73
GLOBULIN UR ELPH-MCNC: 3.3 GM/DL
GLUCOSE SERPL-MCNC: 77 MG/DL (ref 65–99)
HCT VFR BLD AUTO: 38.1 % (ref 34–46.6)
HDLC SERPL-MCNC: 59 MG/DL (ref 40–60)
HGB BLD-MCNC: 12.5 G/DL (ref 12–15.9)
IMM GRANULOCYTES # BLD AUTO: 0.01 10*3/MM3 (ref 0–0.05)
IMM GRANULOCYTES NFR BLD AUTO: 0.2 % (ref 0–0.5)
LDLC SERPL CALC-MCNC: 131 MG/DL (ref 0–100)
LDLC/HDLC SERPL: 2.18 {RATIO}
LYMPHOCYTES # BLD AUTO: 2.5 10*3/MM3 (ref 0.7–3.1)
LYMPHOCYTES NFR BLD AUTO: 52.9 % (ref 19.6–45.3)
MCH RBC QN AUTO: 30.3 PG (ref 26.6–33)
MCHC RBC AUTO-ENTMCNC: 32.8 G/DL (ref 31.5–35.7)
MCV RBC AUTO: 92.3 FL (ref 79–97)
MONOCYTES # BLD AUTO: 0.37 10*3/MM3 (ref 0.1–0.9)
MONOCYTES NFR BLD AUTO: 7.8 % (ref 5–12)
NEUTROPHILS NFR BLD AUTO: 1.66 10*3/MM3 (ref 1.7–7)
NEUTROPHILS NFR BLD AUTO: 35.1 % (ref 42.7–76)
NRBC BLD AUTO-RTO: 0 /100 WBC (ref 0–0.2)
PLATELET # BLD AUTO: 235 10*3/MM3 (ref 140–450)
PMV BLD AUTO: 12 FL (ref 6–12)
POTASSIUM SERPL-SCNC: 3.9 MMOL/L (ref 3.5–5.2)
PROT SERPL-MCNC: 7.7 G/DL (ref 6–8.5)
RBC # BLD AUTO: 4.13 10*6/MM3 (ref 3.77–5.28)
SODIUM SERPL-SCNC: 143 MMOL/L (ref 136–145)
T4 FREE SERPL-MCNC: 1.5 NG/DL (ref 0.93–1.7)
TRIGL SERPL-MCNC: 102 MG/DL (ref 0–150)
TSH SERPL DL<=0.05 MIU/L-ACNC: 1.41 UIU/ML (ref 0.27–4.2)
VIT B12 BLD-MCNC: 492 PG/ML (ref 211–946)
VLDLC SERPL-MCNC: 18 MG/DL (ref 5–40)
WBC # BLD AUTO: 4.73 10*3/MM3 (ref 3.4–10.8)

## 2021-09-17 ENCOUNTER — OFFICE VISIT (OUTPATIENT)
Dept: ENDOCRINOLOGY | Facility: CLINIC | Age: 66
End: 2021-09-17

## 2021-09-17 VITALS
HEART RATE: 63 BPM | DIASTOLIC BLOOD PRESSURE: 86 MMHG | WEIGHT: 275.7 LBS | OXYGEN SATURATION: 97 % | HEIGHT: 65 IN | SYSTOLIC BLOOD PRESSURE: 146 MMHG | BODY MASS INDEX: 45.94 KG/M2

## 2021-09-17 DIAGNOSIS — E78.2 MIXED HYPERLIPIDEMIA: ICD-10-CM

## 2021-09-17 DIAGNOSIS — E06.3 HASHIMOTO'S DISEASE: ICD-10-CM

## 2021-09-17 DIAGNOSIS — E55.9 VITAMIN D DEFICIENCY: ICD-10-CM

## 2021-09-17 DIAGNOSIS — E04.1 NONTOXIC THYROID NODULE: Primary | ICD-10-CM

## 2021-09-17 PROCEDURE — 99214 OFFICE O/P EST MOD 30 MIN: CPT | Performed by: NURSE PRACTITIONER

## 2021-09-17 NOTE — PROGRESS NOTES
"Chief Complaint  Hyperlipidemia and Hashimoto's Thyroiditis    Subjective          Rosa M Ferguson presents to [facility] ENDOCRINOLOGY  History of Present Illness         In office visit    66-year-old female presents for follow-up    Reason hypothyroidism    Timing constant        Severity moderate    Duration diagnosed 1997      Lab Results   Component Value Date    TSH 1.410 08/26/2021          Alleviating factors compliance with brand name Synthroid                 Goiter     It is stable denies any type of dysphagia compression symptoms              vitamin D deficiency     Misses doses        hyperlipidemia     Taking a statin     -  Review of Systems - General ROS: negative             Objective   Vital Signs:   /86   Pulse 63   Ht 165.1 cm (65\")   Wt 125 kg (275 lb 11.2 oz)   SpO2 97%   BMI 45.88 kg/m²     Physical Exam  Constitutional:       Appearance: Normal appearance.   Cardiovascular:      Rate and Rhythm: Regular rhythm.      Heart sounds: Normal heart sounds.   Pulmonary:      Breath sounds: Normal breath sounds.   Musculoskeletal:      Cervical back: Normal range of motion.   Neurological:      Mental Status: She is alert.        Result Review :   The following data was reviewed by: RACHEL Zabala on 09/17/2021:  Common labs    Common Labsle 3/12/21 3/12/21 3/12/21 5/25/21 5/25/21 5/25/21 8/26/21 8/26/21 8/26/21    1030 1030 1030 1024 1024 1024 1040 1040 1040   Glucose  83  93     77   BUN  13  14     13   Creatinine  0.71  0.74     0.72   eGFR  Am  100  95     99   Sodium  139  137     143   Potassium  4.1  4.1     3.9   Chloride  105  104     108 (A)   Calcium  9.3  9.8     9.4   Albumin  3.90  4.20     4.40   Total Bilirubin  0.5  0.6     0.4   Alkaline Phosphatase  111  111     101   AST (SGOT)  20  17     20   ALT (SGPT)  17  17     18   WBC 4.58     4.64 4.73     Hemoglobin 12.7     13.4 12.5     Hematocrit 38.4     " 40.1 38.1     Platelets 231     232 235     Total Cholesterol   205 (A)  204 (A)   208 (A)    Triglycerides   57  95   102    HDL Cholesterol   68 (A)  64 (A)   59    LDL Cholesterol    127 (A)  123 (A)   131 (A)    (A) Abnormal value                      Assessment and Plan    Diagnoses and all orders for this visit:    1. Nontoxic thyroid nodule (Primary)  -     US Thyroid; Future  -     CBC & Differential; Future  -     Comprehensive Metabolic Panel; Future  -     Lipid Panel; Future  -     TSH; Future  -     Vitamin D 25 Hydroxy; Future  -     Vitamin B12; Future    2. Hashimoto's disease  -     CBC & Differential; Future  -     Comprehensive Metabolic Panel; Future  -     Lipid Panel; Future  -     TSH; Future  -     Vitamin D 25 Hydroxy; Future  -     Vitamin B12; Future    3. Vitamin D deficiency  -     CBC & Differential; Future  -     Comprehensive Metabolic Panel; Future  -     Lipid Panel; Future  -     TSH; Future  -     Vitamin D 25 Hydroxy; Future  -     Vitamin B12; Future    4. Mixed hyperlipidemia  -     CBC & Differential; Future  -     Comprehensive Metabolic Panel; Future  -     Lipid Panel; Future  -     TSH; Future  -     Vitamin D 25 Hydroxy; Future  -     Vitamin B12; Future           Hypothyroidism due to Hashimoto's                  Taking Levothyroxine 100 mcg one daily         Lab Results   Component Value Date    TSH 1.410 08/26/2021           --------------------------------------------------------------        Nontoxic multinodular goiter      2012  barium swallow and PFT to evaluate for extrinsic compression were normal                    June 2016     Reviewed by Dr. Munoz the left 1.8 cm cystic thyroid nodule did measure 1.5 cm --appears low risk for malignancy repeat in 6 months - scheduled for Jan. 13, 2017--biopsy consistent with underactive thyroid                     Notes Recorded by Harry Person MD on 2/1/2017 at 11:38 PM  Please call patient with results. Let her  know that the biopsy was consistent with a diagnosis of underactive thyroid, no malignancy. Her thyroid levels are normal so I suggest we don't change her dose.   Note for Me : FNA states insufficient but her US features are typical of Hashimoto's and the cytology shows lymphocytic infiltration as expected in Hashimoto's so I will not consider it insufficient. There is no need for repeat FNA of the lesion that I targeted since it would only show lymphocytic infiltration. Images clearly document adequate needle placement so mis sampling didn't occur. Pathology should distinguish with expected findings in hashimoto;s vs nondiagnostic.                                      Personally reviewed thyroid ultrasound and left inferior nodule is stable in size, previously 1.8 now 1.9       PROCEDURE: Neck sonogram     COMPARISON: No comparison     HISTORY: Thyroid cyst     FINDINGS: Realtime grayscale and color-flow imaging is obtained  of the thyroid gland. The thyroid is enlarged. The right lobe  measures 5.8 x 2.9 x 2.8 cm. The left lobe measures 6.4 x 3.1 x  3.1 cm. The thyroid parenchyma is diffusely heterogeneous.  There are multiple nodules in the left lobe. One is in the mid  pole region, hypoechoic and solid, measuring 0.7 x 0.4 x 0.7 cm.  One is in the inferior pole which appears hypoechoic and solid  measuring 1.9 x 1.3 x 1.5 cm, slightly larger than on the prior  exam from 2/13/2020. One is in the inferior pole measuring 0.8 x  0.6 x 1.1 cm which appears cystic.     IMPRESSION:  CONCLUSION:    Two hypoechoic right thyroid nodules, with the largest measuring  1.9 cm, slightly larger than on the exam from 2/13/2020. Consider  tissue sampling if not already performed on this nodule.     Electronically signed by:  Edouard Meneses MD  2/18/2021 3:14 PM CST  Workstation: [id number]        Repeat in one  Year, FNA was benign    Repeat in Feb. 2022       -----------------------------------------------------     [facility] -             change to OTC 2000 units daily        Component      Latest Ref Rng & Units 8/26/2021   25 Hydroxy, Vitamin D      30.0 - 100.0 ng/ml 28.4 (L)          ------------------------------------------------------------------------------------     Dyslipidemia                 Taking lipitor 20 mg qhs         Total Cholesterol   Date Value Ref Range Status   08/26/2021 208 (H) 0 - 200 mg/dL Final     Triglycerides   Date Value Ref Range Status   08/26/2021 102 0 - 150 mg/dL Final     HDL Cholesterol   Date Value Ref Range Status   08/26/2021 59 40 - 60 mg/dL Final     LDL Cholesterol    Date Value Ref Range Status   08/26/2021 131 (H) 0 - 100 mg/dL Final               Follow Up   Return in about 6 months (around 3/17/2022) for Recheck.  Patient was given instructions and counseling regarding her condition or for health maintenance advice. Please see specific information pulled into the AVS if appropriate.         This document has been electronically signed by RACHEL Zabala on September 17, 2021 09:39 CDT.

## 2022-02-08 ENCOUNTER — HOSPITAL ENCOUNTER (OUTPATIENT)
Dept: ULTRASOUND IMAGING | Facility: HOSPITAL | Age: 67
Discharge: HOME | End: 2022-02-08
Admitting: NURSE PRACTITIONER

## 2022-02-08 DIAGNOSIS — E04.1 NONTOXIC THYROID NODULE: ICD-10-CM

## 2022-02-08 PROCEDURE — 76536 US EXAM OF HEAD AND NECK: CPT

## 2022-03-10 ENCOUNTER — LAB (OUTPATIENT)
Dept: LAB | Facility: HOSPITAL | Age: 67
End: 2022-03-10

## 2022-03-10 DIAGNOSIS — E06.3 HASHIMOTO'S DISEASE: ICD-10-CM

## 2022-03-10 DIAGNOSIS — E78.2 MIXED HYPERLIPIDEMIA: ICD-10-CM

## 2022-03-10 DIAGNOSIS — E55.9 VITAMIN D DEFICIENCY: ICD-10-CM

## 2022-03-10 DIAGNOSIS — E04.1 NONTOXIC THYROID NODULE: ICD-10-CM

## 2022-03-10 LAB
25(OH)D3 SERPL-MCNC: 37 NG/ML (ref 30–100)
ALBUMIN SERPL-MCNC: 4.2 G/DL (ref 3.5–5.2)
ALBUMIN/GLOB SERPL: 1.1 G/DL
ALP SERPL-CCNC: 109 U/L (ref 39–117)
ALT SERPL W P-5'-P-CCNC: 17 U/L (ref 1–33)
ANION GAP SERPL CALCULATED.3IONS-SCNC: 12 MMOL/L (ref 5–15)
AST SERPL-CCNC: 19 U/L (ref 1–32)
BASOPHILS # BLD AUTO: 0.04 10*3/MM3 (ref 0–0.2)
BASOPHILS NFR BLD AUTO: 0.9 % (ref 0–1.5)
BILIRUB SERPL-MCNC: 0.4 MG/DL (ref 0–1.2)
BUN SERPL-MCNC: 11 MG/DL (ref 8–23)
BUN/CREAT SERPL: 14.9 (ref 7–25)
CALCIUM SPEC-SCNC: 9.9 MG/DL (ref 8.6–10.5)
CHLORIDE SERPL-SCNC: 103 MMOL/L (ref 98–107)
CHOLEST SERPL-MCNC: 303 MG/DL (ref 0–200)
CO2 SERPL-SCNC: 24 MMOL/L (ref 22–29)
CREAT SERPL-MCNC: 0.74 MG/DL (ref 0.57–1)
DEPRECATED RDW RBC AUTO: 42.8 FL (ref 37–54)
EGFRCR SERPLBLD CKD-EPI 2021: 89.4 ML/MIN/1.73
EOSINOPHIL # BLD AUTO: 0.11 10*3/MM3 (ref 0–0.4)
EOSINOPHIL NFR BLD AUTO: 2.4 % (ref 0.3–6.2)
ERYTHROCYTE [DISTWIDTH] IN BLOOD BY AUTOMATED COUNT: 12.9 % (ref 12.3–15.4)
GLOBULIN UR ELPH-MCNC: 4 GM/DL
GLUCOSE SERPL-MCNC: 72 MG/DL (ref 65–99)
HCT VFR BLD AUTO: 41.3 % (ref 34–46.6)
HDLC SERPL-MCNC: 69 MG/DL (ref 40–60)
HGB BLD-MCNC: 13.5 G/DL (ref 12–15.9)
IMM GRANULOCYTES # BLD AUTO: 0.01 10*3/MM3 (ref 0–0.05)
IMM GRANULOCYTES NFR BLD AUTO: 0.2 % (ref 0–0.5)
LDLC SERPL CALC-MCNC: 220 MG/DL (ref 0–100)
LDLC/HDLC SERPL: 3.14 {RATIO}
LYMPHOCYTES # BLD AUTO: 2.47 10*3/MM3 (ref 0.7–3.1)
LYMPHOCYTES NFR BLD AUTO: 53.8 % (ref 19.6–45.3)
MCH RBC QN AUTO: 29.9 PG (ref 26.6–33)
MCHC RBC AUTO-ENTMCNC: 32.7 G/DL (ref 31.5–35.7)
MCV RBC AUTO: 91.4 FL (ref 79–97)
MONOCYTES # BLD AUTO: 0.38 10*3/MM3 (ref 0.1–0.9)
MONOCYTES NFR BLD AUTO: 8.3 % (ref 5–12)
NEUTROPHILS NFR BLD AUTO: 1.58 10*3/MM3 (ref 1.7–7)
NEUTROPHILS NFR BLD AUTO: 34.4 % (ref 42.7–76)
NRBC BLD AUTO-RTO: 0 /100 WBC (ref 0–0.2)
PLATELET # BLD AUTO: 225 10*3/MM3 (ref 140–450)
PMV BLD AUTO: 12.4 FL (ref 6–12)
POTASSIUM SERPL-SCNC: 4.2 MMOL/L (ref 3.5–5.2)
PROT SERPL-MCNC: 8.2 G/DL (ref 6–8.5)
RBC # BLD AUTO: 4.52 10*6/MM3 (ref 3.77–5.28)
SODIUM SERPL-SCNC: 139 MMOL/L (ref 136–145)
TRIGL SERPL-MCNC: 86 MG/DL (ref 0–150)
TSH SERPL DL<=0.05 MIU/L-ACNC: 1.3 UIU/ML (ref 0.27–4.2)
VIT B12 BLD-MCNC: 455 PG/ML (ref 211–946)
VLDLC SERPL-MCNC: 14 MG/DL (ref 5–40)
WBC NRBC COR # BLD: 4.59 10*3/MM3 (ref 3.4–10.8)

## 2022-03-10 PROCEDURE — 80053 COMPREHEN METABOLIC PANEL: CPT

## 2022-03-10 PROCEDURE — 36415 COLL VENOUS BLD VENIPUNCTURE: CPT

## 2022-03-10 PROCEDURE — 82607 VITAMIN B-12: CPT

## 2022-03-10 PROCEDURE — 80061 LIPID PANEL: CPT

## 2022-03-10 PROCEDURE — 85025 COMPLETE CBC W/AUTO DIFF WBC: CPT

## 2022-03-10 PROCEDURE — 82306 VITAMIN D 25 HYDROXY: CPT

## 2022-03-10 PROCEDURE — 84443 ASSAY THYROID STIM HORMONE: CPT

## 2022-03-17 ENCOUNTER — OFFICE VISIT (OUTPATIENT)
Dept: ENDOCRINOLOGY | Facility: CLINIC | Age: 67
End: 2022-03-17

## 2022-03-17 VITALS
HEIGHT: 65 IN | DIASTOLIC BLOOD PRESSURE: 84 MMHG | HEART RATE: 92 BPM | BODY MASS INDEX: 46.8 KG/M2 | WEIGHT: 280.9 LBS | SYSTOLIC BLOOD PRESSURE: 142 MMHG | OXYGEN SATURATION: 100 %

## 2022-03-17 DIAGNOSIS — E78.2 MIXED HYPERLIPIDEMIA: ICD-10-CM

## 2022-03-17 DIAGNOSIS — E55.9 VITAMIN D DEFICIENCY: ICD-10-CM

## 2022-03-17 DIAGNOSIS — E06.3 HASHIMOTO'S DISEASE: Primary | ICD-10-CM

## 2022-03-17 DIAGNOSIS — E04.1 NONTOXIC THYROID NODULE: ICD-10-CM

## 2022-03-17 PROCEDURE — 99214 OFFICE O/P EST MOD 30 MIN: CPT | Performed by: NURSE PRACTITIONER

## 2022-03-17 RX ORDER — ATORVASTATIN CALCIUM 40 MG/1
40 TABLET, FILM COATED ORAL DAILY
Qty: 30 TABLET | Refills: 11 | Status: SHIPPED | OUTPATIENT
Start: 2022-03-17 | End: 2023-03-17

## 2022-03-17 RX ORDER — PANTOPRAZOLE SODIUM 40 MG/1
40 TABLET, DELAYED RELEASE ORAL DAILY
Qty: 90 TABLET | Refills: 0 | Status: SHIPPED | OUTPATIENT
Start: 2022-03-17 | End: 2022-06-23 | Stop reason: SDUPTHER

## 2022-03-17 RX ORDER — ERGOCALCIFEROL 1.25 MG/1
50000 CAPSULE ORAL
Qty: 12 CAPSULE | Refills: 3 | Status: SHIPPED | OUTPATIENT
Start: 2022-03-17 | End: 2023-03-30 | Stop reason: SDUPTHER

## 2022-03-17 RX ORDER — LEVOTHYROXINE SODIUM 0.1 MG/1
100 TABLET ORAL DAILY
Qty: 90 TABLET | Refills: 3 | Status: SHIPPED | OUTPATIENT
Start: 2022-03-17 | End: 2023-03-27

## 2022-03-17 NOTE — PROGRESS NOTES
"Chief Complaint  Thyroid Problem    Subjective          Rosa M Ferguson presents to [facility]  History of Present Illness     In office visit    67 year old female presents for follow up     Reason hypothyroidism     Diagnosed in 1997    Timing constant     Quality controlled     Severity moderate     Alleviating factors compliance with brand name Synthroid             Goiter        It is stable denies any type of dysphagia compression symptoms                        Review of Systems - General ROS: negative      Objective   Vital Signs:   /84   Pulse 92   Ht 165.1 cm (65\")   Wt 127 kg (280 lb 14.4 oz)   SpO2 100%   BMI 46.74 kg/m²     Physical Exam  Constitutional:       Appearance: Normal appearance.   Cardiovascular:      Rate and Rhythm: Regular rhythm.      Heart sounds: Normal heart sounds.   Pulmonary:      Breath sounds: Normal breath sounds.   Musculoskeletal:         General: Normal range of motion.      Cervical back: Normal range of motion and neck supple.   Neurological:      Mental Status: She is alert.        Result Review :   The following data was reviewed by: RACHEL Zabala on 03/17/2022:  Common labs    Common Labsle 5/25/21 5/25/21 5/25/21 8/26/21 8/26/21 8/26/21 3/10/22 3/10/22 3/10/22    1024 1024 1024 1040 1040 1040 1227 1227 1227   Glucose 93     77  72    BUN 14     13  11    Creatinine 0.74     0.72  0.74    eGFR  Am 95     99      Sodium 137     143  139    Potassium 4.1     3.9  4.2    Chloride 104     108 (A)  103    Calcium 9.8     9.4  9.9    Albumin 4.20     4.40  4.20    Total Bilirubin 0.6     0.4  0.4    Alkaline Phosphatase 111     101  109    AST (SGOT) 17     20  19    ALT (SGPT) 17     18  17    WBC   4.64 4.73   4.59     Hemoglobin   13.4 12.5   13.5     Hematocrit   40.1 38.1   41.3     Platelets   232 235   225     Total Cholesterol  204 (A)   208 (A)    303 (A)   Triglycerides  95   102    86   HDL " Cholesterol  64 (A)   59    69 (A)   LDL Cholesterol   123 (A)   131 (A)    220 (A)   (A) Abnormal value                      Assessment and Plan    Diagnoses and all orders for this visit:    1. Hashimoto's disease (Primary)  -     CBC & Differential; Future  -     Comprehensive Metabolic Panel; Future  -     Lipid Panel; Future  -     TSH; Future  -     Vitamin D 25 Hydroxy; Future  -     Vitamin B12; Future    2. Nontoxic thyroid nodule  -     CBC & Differential; Future  -     Comprehensive Metabolic Panel; Future  -     Lipid Panel; Future  -     TSH; Future  -     Vitamin D 25 Hydroxy; Future  -     Vitamin B12; Future    3. Vitamin D deficiency  -     CBC & Differential; Future  -     Comprehensive Metabolic Panel; Future  -     Lipid Panel; Future  -     TSH; Future  -     Vitamin D 25 Hydroxy; Future  -     Vitamin B12; Future    4. Mixed hyperlipidemia  -     CBC & Differential; Future  -     Comprehensive Metabolic Panel; Future  -     Lipid Panel; Future  -     TSH; Future  -     Vitamin D 25 Hydroxy; Future  -     Vitamin B12; Future    Other orders  -     atorvastatin (Lipitor) 40 MG tablet; Take 1 tablet by mouth Daily.  Dispense: 30 tablet; Refill: 11  -     vitamin D (ERGOCALCIFEROL) 1.25 MG (02009 UT) capsule capsule; Take 1 capsule by mouth Every 7 (Seven) Days.  Dispense: 12 capsule; Refill: 3  -     levothyroxine (SYNTHROID, LEVOTHROID) 100 MCG tablet; Take 1 tablet by mouth Daily.  Dispense: 90 tablet; Refill: 3             Hypothyroidism due to Hashimoto's                  Taking Levothyroxine 100 mcg one daily         Lab Results   Component Value Date    TSH 1.300 03/10/2022        --------------------------------------------------------------        Nontoxic multinodular goiter      2012  barium swallow and PFT to evaluate for extrinsic compression were normal                    June 2016     Reviewed by Dr. Munoz the left 1.8 cm cystic thyroid nodule did measure 1.5 cm --appears low risk  for malignancy repeat in 6 months - scheduled for Jan. 13, 2017--biopsy consistent with underactive thyroid                             Notes Recorded by Harry Person MD on 2/1/2017 at 11:38 PM  Please call patient with results. Let her know that the biopsy was consistent with a diagnosis of underactive thyroid, no malignancy. Her thyroid levels are normal so I suggest we don't change her dose.   Note for Me : FNA states insufficient but her US features are typical of Hashimoto's and the cytology shows lymphocytic infiltration as expected in Hashimoto's so I will not consider it insufficient. There is no need for repeat FNA of the lesion that I targeted since it would only show lymphocytic infiltration. Images clearly document adequate needle placement so mis sampling didn't occur. Pathology should distinguish with expected findings in hashimoto;s vs nondiagnostic.                                           Personally reviewed thyroid ultrasound and left inferior nodule is stable in size, previously 1.8 now 1.9        PROCEDURE: Neck sonogram     COMPARISON: No comparison     HISTORY: Thyroid cyst     FINDINGS: Realtime grayscale and color-flow imaging is obtained  of the thyroid gland. The thyroid is enlarged. The right lobe  measures 5.8 x 2.9 x 2.8 cm. The left lobe measures 6.4 x 3.1 x  3.1 cm. The thyroid parenchyma is diffusely heterogeneous.  There are multiple nodules in the left lobe. One is in the mid  pole region, hypoechoic and solid, measuring 0.7 x 0.4 x 0.7 cm.  One is in the inferior pole which appears hypoechoic and solid  measuring 1.9 x 1.3 x 1.5 cm, slightly larger than on the prior  exam from 2/13/2020. One is in the inferior pole measuring 0.8 x  0.6 x 1.1 cm which appears cystic.     IMPRESSION:  CONCLUSION:    Two hypoechoic right thyroid nodules, with the largest measuring  1.9 cm, slightly larger than on the exam from 2/13/2020. Consider  tissue sampling if not already performed  on this nodule.     Electronically signed by:  Edouard Meneses MD  2/18/2021 3:14 PM [facility]  Workstation: [id number]           Repeat in one  Year, FNA was benign     Repeat in Feb. 2022----stable exam repeat in one year         -----------------------------------------------------     Vid D def -            change to OTC 2000 units daily                  ------------------------------------------------------------------------------------     Dyslipidemia                 Taking lipitor 20 mg qhs --- not taking       Increase to 40 mg one at night           Total Cholesterol   Date Value Ref Range Status   03/10/2022 303 (H) 0 - 200 mg/dL Final     Triglycerides   Date Value Ref Range Status   03/10/2022 86 0 - 150 mg/dL Final     HDL Cholesterol   Date Value Ref Range Status   03/10/2022 69 (H) 40 - 60 mg/dL Final     LDL Cholesterol    Date Value Ref Range Status   03/10/2022 220 (H) 0 - 100 mg/dL Final                    Follow Up   Return in about 6 months (around 9/17/2022) for Recheck.  Patient was given instructions and counseling regarding her condition or for health maintenance advice. Please see specific information pulled into the AVS if appropriate.         This document has been electronically signed by RACHEL Zabala on March 17, 2022 09:51 CDT.

## 2022-03-17 NOTE — TELEPHONE ENCOUNTER
Last OV 08/26/2021    Left message on patient's VM that she is past due for follow-up with Dr Garsia.

## 2022-06-23 ENCOUNTER — OFFICE VISIT (OUTPATIENT)
Dept: FAMILY MEDICINE CLINIC | Facility: CLINIC | Age: 67
End: 2022-06-23

## 2022-06-23 ENCOUNTER — LAB (OUTPATIENT)
Dept: LAB | Facility: HOSPITAL | Age: 67
End: 2022-06-23

## 2022-06-23 VITALS
OXYGEN SATURATION: 97 % | SYSTOLIC BLOOD PRESSURE: 136 MMHG | WEIGHT: 278.25 LBS | HEART RATE: 77 BPM | DIASTOLIC BLOOD PRESSURE: 80 MMHG | BODY MASS INDEX: 41.21 KG/M2 | HEIGHT: 69 IN

## 2022-06-23 DIAGNOSIS — E78.2 MIXED HYPERLIPIDEMIA: ICD-10-CM

## 2022-06-23 DIAGNOSIS — E03.9 HYPOTHYROIDISM, UNSPECIFIED TYPE: ICD-10-CM

## 2022-06-23 DIAGNOSIS — K21.9 GASTROESOPHAGEAL REFLUX DISEASE, UNSPECIFIED WHETHER ESOPHAGITIS PRESENT: Primary | ICD-10-CM

## 2022-06-23 LAB
ALBUMIN SERPL-MCNC: 4.3 G/DL (ref 3.5–5.2)
ALBUMIN/GLOB SERPL: 1.1 G/DL
ALP SERPL-CCNC: 114 U/L (ref 39–117)
ALT SERPL W P-5'-P-CCNC: 15 U/L (ref 1–33)
ANION GAP SERPL CALCULATED.3IONS-SCNC: 14.8 MMOL/L (ref 5–15)
AST SERPL-CCNC: 18 U/L (ref 1–32)
BILIRUB SERPL-MCNC: 0.8 MG/DL (ref 0–1.2)
BUN SERPL-MCNC: 11 MG/DL (ref 8–23)
BUN/CREAT SERPL: 14.7 (ref 7–25)
CALCIUM SPEC-SCNC: 9.3 MG/DL (ref 8.6–10.5)
CHLORIDE SERPL-SCNC: 104 MMOL/L (ref 98–107)
CHOLEST SERPL-MCNC: 205 MG/DL (ref 0–200)
CO2 SERPL-SCNC: 22.2 MMOL/L (ref 22–29)
CREAT SERPL-MCNC: 0.75 MG/DL (ref 0.57–1)
EGFRCR SERPLBLD CKD-EPI 2021: 87.9 ML/MIN/1.73
GLOBULIN UR ELPH-MCNC: 3.8 GM/DL
GLUCOSE SERPL-MCNC: 82 MG/DL (ref 65–99)
HDLC SERPL-MCNC: 68 MG/DL (ref 40–60)
LDLC SERPL CALC-MCNC: 125 MG/DL (ref 0–100)
LDLC/HDLC SERPL: 1.82 {RATIO}
POTASSIUM SERPL-SCNC: 3.9 MMOL/L (ref 3.5–5.2)
PROT SERPL-MCNC: 8.1 G/DL (ref 6–8.5)
SODIUM SERPL-SCNC: 141 MMOL/L (ref 136–145)
T4 FREE SERPL-MCNC: 1.62 NG/DL (ref 0.93–1.7)
TRIGL SERPL-MCNC: 65 MG/DL (ref 0–150)
TSH SERPL DL<=0.05 MIU/L-ACNC: 0.64 UIU/ML (ref 0.27–4.2)
VLDLC SERPL-MCNC: 12 MG/DL (ref 5–40)

## 2022-06-23 PROCEDURE — 36415 COLL VENOUS BLD VENIPUNCTURE: CPT

## 2022-06-23 PROCEDURE — 80061 LIPID PANEL: CPT

## 2022-06-23 PROCEDURE — 80053 COMPREHEN METABOLIC PANEL: CPT

## 2022-06-23 PROCEDURE — 85025 COMPLETE CBC W/AUTO DIFF WBC: CPT

## 2022-06-23 PROCEDURE — 84439 ASSAY OF FREE THYROXINE: CPT

## 2022-06-23 PROCEDURE — 84443 ASSAY THYROID STIM HORMONE: CPT

## 2022-06-23 PROCEDURE — 99214 OFFICE O/P EST MOD 30 MIN: CPT | Performed by: FAMILY MEDICINE

## 2022-06-23 RX ORDER — PANTOPRAZOLE SODIUM 40 MG/1
40 TABLET, DELAYED RELEASE ORAL DAILY
Qty: 90 TABLET | Refills: 1 | Status: SHIPPED | OUTPATIENT
Start: 2022-06-23 | End: 2023-03-30 | Stop reason: SDUPTHER

## 2022-06-23 NOTE — PROGRESS NOTES
Subjective   Rosa M Ferguson is a 67 y.o. female.   Cc: follow up of chronic medical issues    Hyperlipidemia  This is a chronic problem. The current episode started more than 1 year ago. The problem is resistant. Exacerbating diseases include hypothyroidism. Pertinent negatives include no chest pain or myalgias. Current antihyperlipidemic treatment includes statins.   Hypothyroidism  This is a chronic problem. The current episode started more than 1 year ago. The problem occurs daily. Associated symptoms include fatigue and headaches. Pertinent negatives include no abdominal pain, anorexia, arthralgias, change in bowel habit, chest pain, chills, congestion, coughing, diaphoresis, fever, joint swelling, myalgias, nausea, neck pain, numbness, rash, sore throat, swollen glands, urinary symptoms, vertigo, visual change, vomiting or weakness.   Heartburn  She complains of early satiety. She reports no abdominal pain, no belching, no chest pain, no choking, no coughing, no dysphagia, no heartburn, no hoarse voice, no nausea, no sore throat, no water brash or no wheezing. Associated symptoms include fatigue.       The following portions of the patient's history were reviewed and updated as appropriate: allergies, current medications, past family history, past medical history, past social history, past surgical history and problem list.    Review of Systems   Constitutional: Positive for fatigue. Negative for chills, diaphoresis and fever.   HENT: Negative for congestion, hoarse voice and sore throat.    Respiratory: Negative for cough, choking and wheezing.    Cardiovascular: Negative for chest pain.   Gastrointestinal: Negative for abdominal pain, anorexia, change in bowel habit, dysphagia, heartburn, nausea and vomiting.   Musculoskeletal: Negative for arthralgias, joint swelling, myalgias and neck pain.   Skin: Negative for rash.   Neurological: Positive for headaches. Negative for vertigo, weakness and numbness.  "      Objective   Physical Exam  Vitals reviewed.   Constitutional:       Appearance: Normal appearance.   HENT:      Head: Normocephalic and atraumatic.      Right Ear: Tympanic membrane, ear canal and external ear normal.      Left Ear: Tympanic membrane, ear canal and external ear normal.      Nose: Nose normal.      Mouth/Throat:      Mouth: Mucous membranes are moist.      Pharynx: Oropharynx is clear.   Cardiovascular:      Rate and Rhythm: Normal rate and regular rhythm.      Heart sounds: Normal heart sounds. No murmur heard.    No friction rub. No gallop.   Pulmonary:      Effort: Pulmonary effort is normal. No respiratory distress.      Breath sounds: Normal breath sounds. No stridor. No wheezing, rhonchi or rales.   Chest:      Chest wall: No tenderness.   Abdominal:      General: Bowel sounds are normal. There is no distension.      Palpations: Abdomen is soft. There is no mass.      Tenderness: There is no abdominal tenderness. There is no guarding or rebound.      Hernia: No hernia is present.   Musculoskeletal:      Cervical back: Normal range of motion.   Skin:     General: Skin is warm and dry.   Neurological:      Mental Status: She is alert.           Visit Vitals  /80   Pulse 77   Ht 175.3 cm (69\")   Wt 126 kg (278 lb 4 oz)   LMP 08/22/2008 (Within Months)   SpO2 97%   Breastfeeding No   BMI 41.09 kg/m²     Body mass index is 41.09 kg/m².      Assessment/Plan   Diagnoses and all orders for this visit:    1. Gastroesophageal reflux disease, unspecified whether esophagitis present (Primary)  -     pantoprazole (PROTONIX) 40 MG EC tablet; Take 1 tablet by mouth Daily.  Dispense: 90 tablet; Refill: 1    2. Hypothyroidism, unspecified type  -     Comprehensive metabolic panel; Future  -     CBC w AUTO Differential; Future  -     TSH; Future  -     T4, free; Future    3. Mixed hyperlipidemia  -     Comprehensive metabolic panel; Future  -     Lipid panel; Future  -     CBC w AUTO Differential; " Future    I reviewed the CBC,CMP,and Lipid Profile with the patient.  Return to the clinic in 3 month/s.  Will contact with results as needed.

## 2022-06-24 LAB
BASOPHILS # BLD AUTO: 0.02 10*3/MM3 (ref 0–0.2)
BASOPHILS NFR BLD AUTO: 0.4 % (ref 0–1.5)
DEPRECATED RDW RBC AUTO: 41.9 FL (ref 37–54)
EOSINOPHIL # BLD AUTO: 0.06 10*3/MM3 (ref 0–0.4)
EOSINOPHIL NFR BLD AUTO: 1.2 % (ref 0.3–6.2)
ERYTHROCYTE [DISTWIDTH] IN BLOOD BY AUTOMATED COUNT: 12.9 % (ref 12.3–15.4)
HCT VFR BLD AUTO: 40.3 % (ref 34–46.6)
HGB BLD-MCNC: 13.3 G/DL (ref 12–15.9)
IMM GRANULOCYTES # BLD AUTO: 0.01 10*3/MM3 (ref 0–0.05)
IMM GRANULOCYTES NFR BLD AUTO: 0.2 % (ref 0–0.5)
LYMPHOCYTES # BLD AUTO: 1.45 10*3/MM3 (ref 0.7–3.1)
LYMPHOCYTES NFR BLD AUTO: 28.7 % (ref 19.6–45.3)
MCH RBC QN AUTO: 29.8 PG (ref 26.6–33)
MCHC RBC AUTO-ENTMCNC: 33 G/DL (ref 31.5–35.7)
MCV RBC AUTO: 90.2 FL (ref 79–97)
MONOCYTES # BLD AUTO: 0.48 10*3/MM3 (ref 0.1–0.9)
MONOCYTES NFR BLD AUTO: 9.5 % (ref 5–12)
NEUTROPHILS NFR BLD AUTO: 3.04 10*3/MM3 (ref 1.7–7)
NEUTROPHILS NFR BLD AUTO: 60 % (ref 42.7–76)
NRBC BLD AUTO-RTO: 0 /100 WBC (ref 0–0.2)
PLATELET # BLD AUTO: 241 10*3/MM3 (ref 140–450)
PMV BLD AUTO: 11.7 FL (ref 6–12)
RBC # BLD AUTO: 4.47 10*6/MM3 (ref 3.77–5.28)
WBC NRBC COR # BLD: 5.06 10*3/MM3 (ref 3.4–10.8)

## 2022-08-29 PROCEDURE — 87660 TRICHOMONAS VAGIN DIR PROBE: CPT | Performed by: FAMILY MEDICINE

## 2022-08-29 PROCEDURE — 87661 TRICHOMONAS VAGINALIS AMPLIF: CPT | Performed by: FAMILY MEDICINE

## 2022-08-29 PROCEDURE — 87491 CHLMYD TRACH DNA AMP PROBE: CPT | Performed by: FAMILY MEDICINE

## 2022-08-29 PROCEDURE — 87480 CANDIDA DNA DIR PROBE: CPT | Performed by: FAMILY MEDICINE

## 2022-08-29 PROCEDURE — 87510 GARDNER VAG DNA DIR PROBE: CPT | Performed by: FAMILY MEDICINE

## 2022-08-29 PROCEDURE — 87591 N.GONORRHOEAE DNA AMP PROB: CPT | Performed by: FAMILY MEDICINE

## 2022-09-23 ENCOUNTER — LAB (OUTPATIENT)
Dept: LAB | Facility: HOSPITAL | Age: 67
End: 2022-09-23

## 2022-09-23 DIAGNOSIS — E04.1 NONTOXIC THYROID NODULE: ICD-10-CM

## 2022-09-23 DIAGNOSIS — E06.3 HASHIMOTO'S DISEASE: ICD-10-CM

## 2022-09-23 DIAGNOSIS — E78.2 MIXED HYPERLIPIDEMIA: ICD-10-CM

## 2022-09-23 DIAGNOSIS — E55.9 VITAMIN D DEFICIENCY: ICD-10-CM

## 2022-09-23 LAB
25(OH)D3 SERPL-MCNC: 46 NG/ML (ref 30–100)
ALBUMIN SERPL-MCNC: 4.3 G/DL (ref 3.5–5.2)
ALBUMIN/GLOB SERPL: 1.1 G/DL
ALP SERPL-CCNC: 107 U/L (ref 39–117)
ALT SERPL W P-5'-P-CCNC: 16 U/L (ref 1–33)
ANION GAP SERPL CALCULATED.3IONS-SCNC: 11 MMOL/L (ref 5–15)
AST SERPL-CCNC: 24 U/L (ref 1–32)
BASOPHILS # BLD AUTO: 0.03 10*3/MM3 (ref 0–0.2)
BASOPHILS NFR BLD AUTO: 0.6 % (ref 0–1.5)
BILIRUB SERPL-MCNC: 0.5 MG/DL (ref 0–1.2)
BUN SERPL-MCNC: 13 MG/DL (ref 8–23)
BUN/CREAT SERPL: 18.6 (ref 7–25)
CALCIUM SPEC-SCNC: 9.6 MG/DL (ref 8.6–10.5)
CHLORIDE SERPL-SCNC: 104 MMOL/L (ref 98–107)
CHOLEST SERPL-MCNC: 245 MG/DL (ref 0–200)
CO2 SERPL-SCNC: 25 MMOL/L (ref 22–29)
CREAT SERPL-MCNC: 0.7 MG/DL (ref 0.57–1)
DEPRECATED RDW RBC AUTO: 43.5 FL (ref 37–54)
EGFRCR SERPLBLD CKD-EPI 2021: 95.5 ML/MIN/1.73
EOSINOPHIL # BLD AUTO: 0.2 10*3/MM3 (ref 0–0.4)
EOSINOPHIL NFR BLD AUTO: 3.9 % (ref 0.3–6.2)
ERYTHROCYTE [DISTWIDTH] IN BLOOD BY AUTOMATED COUNT: 13.3 % (ref 12.3–15.4)
GLOBULIN UR ELPH-MCNC: 3.9 GM/DL
GLUCOSE SERPL-MCNC: 79 MG/DL (ref 65–99)
HCT VFR BLD AUTO: 38.8 % (ref 34–46.6)
HDLC SERPL-MCNC: 68 MG/DL (ref 40–60)
HGB BLD-MCNC: 13.1 G/DL (ref 12–15.9)
IMM GRANULOCYTES # BLD AUTO: 0.01 10*3/MM3 (ref 0–0.05)
IMM GRANULOCYTES NFR BLD AUTO: 0.2 % (ref 0–0.5)
LDLC SERPL CALC-MCNC: 166 MG/DL (ref 0–100)
LDLC/HDLC SERPL: 2.41 {RATIO}
LYMPHOCYTES # BLD AUTO: 2.33 10*3/MM3 (ref 0.7–3.1)
LYMPHOCYTES NFR BLD AUTO: 45.5 % (ref 19.6–45.3)
MCH RBC QN AUTO: 30.5 PG (ref 26.6–33)
MCHC RBC AUTO-ENTMCNC: 33.8 G/DL (ref 31.5–35.7)
MCV RBC AUTO: 90.2 FL (ref 79–97)
MONOCYTES # BLD AUTO: 0.35 10*3/MM3 (ref 0.1–0.9)
MONOCYTES NFR BLD AUTO: 6.8 % (ref 5–12)
NEUTROPHILS NFR BLD AUTO: 2.2 10*3/MM3 (ref 1.7–7)
NEUTROPHILS NFR BLD AUTO: 43 % (ref 42.7–76)
NRBC BLD AUTO-RTO: 0 /100 WBC (ref 0–0.2)
PLATELET # BLD AUTO: 236 10*3/MM3 (ref 140–450)
PMV BLD AUTO: 11.8 FL (ref 6–12)
POTASSIUM SERPL-SCNC: 3.7 MMOL/L (ref 3.5–5.2)
PROT SERPL-MCNC: 8.2 G/DL (ref 6–8.5)
RBC # BLD AUTO: 4.3 10*6/MM3 (ref 3.77–5.28)
SODIUM SERPL-SCNC: 140 MMOL/L (ref 136–145)
TRIGL SERPL-MCNC: 64 MG/DL (ref 0–150)
TSH SERPL DL<=0.05 MIU/L-ACNC: 1.05 UIU/ML (ref 0.27–4.2)
VIT B12 BLD-MCNC: 403 PG/ML (ref 211–946)
VLDLC SERPL-MCNC: 11 MG/DL (ref 5–40)
WBC NRBC COR # BLD: 5.12 10*3/MM3 (ref 3.4–10.8)

## 2022-09-23 PROCEDURE — 80053 COMPREHEN METABOLIC PANEL: CPT

## 2022-09-23 PROCEDURE — 80061 LIPID PANEL: CPT

## 2022-09-23 PROCEDURE — 84443 ASSAY THYROID STIM HORMONE: CPT

## 2022-09-23 PROCEDURE — 82607 VITAMIN B-12: CPT

## 2022-09-23 PROCEDURE — 85025 COMPLETE CBC W/AUTO DIFF WBC: CPT

## 2022-09-23 PROCEDURE — 36415 COLL VENOUS BLD VENIPUNCTURE: CPT

## 2022-09-23 PROCEDURE — 82306 VITAMIN D 25 HYDROXY: CPT

## 2022-09-30 ENCOUNTER — OFFICE VISIT (OUTPATIENT)
Dept: ENDOCRINOLOGY | Facility: CLINIC | Age: 67
End: 2022-09-30

## 2022-09-30 VITALS
BODY MASS INDEX: 45.45 KG/M2 | DIASTOLIC BLOOD PRESSURE: 86 MMHG | RESPIRATION RATE: 18 BRPM | SYSTOLIC BLOOD PRESSURE: 132 MMHG | WEIGHT: 272.8 LBS | HEIGHT: 65 IN | HEART RATE: 76 BPM | OXYGEN SATURATION: 99 %

## 2022-09-30 DIAGNOSIS — E06.3 HASHIMOTO'S DISEASE: ICD-10-CM

## 2022-09-30 DIAGNOSIS — E78.2 MIXED HYPERLIPIDEMIA: ICD-10-CM

## 2022-09-30 DIAGNOSIS — E04.1 NONTOXIC THYROID NODULE: Primary | ICD-10-CM

## 2022-09-30 DIAGNOSIS — E55.9 VITAMIN D DEFICIENCY: ICD-10-CM

## 2022-09-30 PROCEDURE — 99214 OFFICE O/P EST MOD 30 MIN: CPT | Performed by: NURSE PRACTITIONER

## 2022-09-30 NOTE — PROGRESS NOTES
"Chief Complaint  Follow-up and thyroid (6 mo)    Subjective          Rosa M Ferguson presents to [facility]  History of Present Illness       In office visit    67 year old female presents for follow up     Reason hypothyroidism     Diagnosed in 1997    Timing constant     Quality controlled     Severity moderate     Alleviating factors compliance with brand name Synthroid     Goiter        It is stable denies any type of dysphagia compression symptoms             Objective   Vital Signs:   /86   Pulse 76   Resp 18   Ht 165.1 cm (65\")   Wt 124 kg (272 lb 12.8 oz)   SpO2 99%   BMI 45.40 kg/m²     Physical Exam  Constitutional:       Appearance: Normal appearance.   Cardiovascular:      Rate and Rhythm: Regular rhythm.      Heart sounds: Normal heart sounds.   Pulmonary:      Breath sounds: Normal breath sounds.   Musculoskeletal:         General: Normal range of motion.      Cervical back: Normal range of motion and neck supple.   Neurological:      Mental Status: She is alert.        Result Review :   The following data was reviewed by: RACHEL Zabala on 03/17/2022:  Common labs    Common Labs 3/10/22 3/10/22 3/10/22 6/23/22 6/23/22 6/23/22 9/23/22 9/23/22 9/23/22    1227 1227 1227 1206 1206 1206 1407 1407 1407   Glucose  72  82    79    BUN  11  11    13    Creatinine  0.74  0.75    0.70    Sodium  139  141    140    Potassium  4.2  3.9    3.7    Chloride  103  104    104    Calcium  9.9  9.3    9.6    Albumin  4.20  4.30    4.30    Total Bilirubin  0.4  0.8    0.5    Alkaline Phosphatase  109  114    107    AST (SGOT)  19  18    24    ALT (SGPT)  17  15    16    WBC 4.59     5.06 5.12     Hemoglobin 13.5     13.3 13.1     Hematocrit 41.3     40.3 38.8     Platelets 225     241 236     Total Cholesterol   303 (A)  205 (A)    245 (A)   Triglycerides   86  65    64   HDL Cholesterol   69 (A)  68 (A)    68 (A)   LDL Cholesterol    220 (A)  125 (A)    " 166 (A)   (A) Abnormal value                        Assessment and Plan    Diagnoses and all orders for this visit:    1. Nontoxic thyroid nodule (Primary)  -     US Thyroid; Future  -     CBC & Differential; Future  -     Comprehensive Metabolic Panel; Future  -     Lipid Panel; Future  -     TSH; Future  -     Vitamin D 25 Hydroxy; Future  -     Vitamin B12; Future    2. Mixed hyperlipidemia  -     CBC & Differential; Future  -     Comprehensive Metabolic Panel; Future  -     Lipid Panel; Future  -     TSH; Future  -     Vitamin D 25 Hydroxy; Future  -     Vitamin B12; Future    3. Hashimoto's disease  -     CBC & Differential; Future  -     Comprehensive Metabolic Panel; Future  -     Lipid Panel; Future  -     TSH; Future  -     Vitamin D 25 Hydroxy; Future  -     Vitamin B12; Future    4. Vitamin D deficiency  -     CBC & Differential; Future  -     Comprehensive Metabolic Panel; Future  -     Lipid Panel; Future  -     TSH; Future  -     Vitamin D 25 Hydroxy; Future  -     Vitamin B12; Future             Hypothyroidism due to Hashimoto's           Taking Levothyroxine 100 mcg one daily         Lab Results   Component Value Date    TSH 1.050 09/23/2022        --------------------------------------------------------------        Nontoxic multinodular goiter      2012  barium swallow and PFT to evaluate for extrinsic compression were normal          June 2016     Reviewed by Dr. Munoz the left 1.8 cm cystic thyroid nodule did measure 1.5 cm --appears low risk for malignancy repeat in 6 months - scheduled for Jan. 13, 2017--biopsy consistent with underactive thyroid                             Notes Recorded by Harry Person MD on 2/1/2017 at 11:38 PM  Please call patient with results. Let her know that the biopsy was consistent with a diagnosis of underactive thyroid, no malignancy. Her thyroid levels are normal so I suggest we don't change her dose.   Note for Me : FNA states insufficient but her US  features are typical of Hashimoto's and the cytology shows lymphocytic infiltration as expected in Hashimoto's so I will not consider it insufficient. There is no need for repeat FNA of the lesion that I targeted since it would only show lymphocytic infiltration. Images clearly document adequate needle placement so mis sampling didn't occur. Pathology should distinguish with expected findings in hashimoto;s vs nondiagnostic.                                           Personally reviewed thyroid ultrasound and left inferior nodule is stable in size, previously 1.8 now 1.9        PROCEDURE: Neck sonogram     COMPARISON: No comparison     HISTORY: Thyroid cyst     FINDINGS: Realtime grayscale and color-flow imaging is obtained  of the thyroid gland. The thyroid is enlarged. The right lobe  measures 5.8 x 2.9 x 2.8 cm. The left lobe measures 6.4 x 3.1 x  3.1 cm. The thyroid parenchyma is diffusely heterogeneous.  There are multiple nodules in the left lobe. One is in the mid  pole region, hypoechoic and solid, measuring 0.7 x 0.4 x 0.7 cm.  One is in the inferior pole which appears hypoechoic and solid  measuring 1.9 x 1.3 x 1.5 cm, slightly larger than on the prior  exam from 2/13/2020. One is in the inferior pole measuring 0.8 x  0.6 x 1.1 cm which appears cystic.     IMPRESSION:  CONCLUSION:    Two hypoechoic right thyroid nodules, with the largest measuring  1.9 cm, slightly larger than on the exam from 2/13/2020. Consider  tissue sampling if not already performed on this nodule.     Electronically signed by:  Edouard Meneses MD  2/18/2021 3:14 PM CST  Workstation: [id number]           Repeat in one  Year, FNA was benign     Repeat in Feb. 2022----stable exam repeat in one year     Ordered for Feb. 2023         -----------------------------------------------------     Vid D def -            change to OTC 2000 units daily      Component      Latest Ref Rng & Units 9/23/2022   25 Hydroxy, Vitamin D      30.0 - 100.0  ng/ml 46.0            Dyslipidemia                 Taking lipitor 40 mg one night- misses doses           Total Cholesterol   Date Value Ref Range Status   09/23/2022 245 (H) 0 - 200 mg/dL Final     Triglycerides   Date Value Ref Range Status   09/23/2022 64 0 - 150 mg/dL Final     HDL Cholesterol   Date Value Ref Range Status   09/23/2022 68 (H) 40 - 60 mg/dL Final     LDL Cholesterol    Date Value Ref Range Status   09/23/2022 166 (H) 0 - 100 mg/dL Final                    Follow Up   No follow-ups on file.  Patient was given instructions and counseling regarding her condition or for health maintenance advice. Please see specific information pulled into the AVS if appropriate.         This document has been electronically signed by RACHEL Zabala on September 30, 2022 13:55 CDT.      Addendum    2013 compared to 2023    There is a stable left 1.3 cm cyst that has been previously biopsied in 2017 that is stable in size.    Radiologist is describing a new thyroid nodule that measures 4 cm in the left lobe that is spongiform.  This nodule  has been present since 2013 and it represents heterogeneity in the background of thyroiditis    No need to biopsy, no need for more ultrasound

## 2022-10-20 ENCOUNTER — CLINICAL SUPPORT (OUTPATIENT)
Dept: FAMILY MEDICINE CLINIC | Facility: CLINIC | Age: 67
End: 2022-10-20

## 2022-10-20 DIAGNOSIS — Z23 NEED FOR INFLUENZA VACCINATION: Primary | ICD-10-CM

## 2022-10-20 PROCEDURE — G0008 ADMIN INFLUENZA VIRUS VAC: HCPCS | Performed by: FAMILY MEDICINE

## 2022-10-20 PROCEDURE — 90662 IIV NO PRSV INCREASED AG IM: CPT | Performed by: FAMILY MEDICINE

## 2023-02-08 ENCOUNTER — HOSPITAL ENCOUNTER (OUTPATIENT)
Dept: ULTRASOUND IMAGING | Facility: HOSPITAL | Age: 68
Discharge: HOME | End: 2023-02-08
Admitting: NURSE PRACTITIONER
Payer: MEDICARE

## 2023-02-08 DIAGNOSIS — E04.1 NONTOXIC THYROID NODULE: ICD-10-CM

## 2023-02-08 PROCEDURE — 76536 US EXAM OF HEAD AND NECK: CPT

## 2023-03-27 RX ORDER — LEVOTHYROXINE SODIUM 0.1 MG/1
100 TABLET ORAL DAILY
Qty: 90 TABLET | Refills: 3 | Status: SHIPPED | OUTPATIENT
Start: 2023-03-27 | End: 2023-03-30 | Stop reason: SDUPTHER

## 2023-03-30 ENCOUNTER — OFFICE VISIT (OUTPATIENT)
Dept: ENDOCRINOLOGY | Facility: CLINIC | Age: 68
End: 2023-03-30
Payer: MEDICARE

## 2023-03-30 VITALS
SYSTOLIC BLOOD PRESSURE: 128 MMHG | WEIGHT: 275.2 LBS | DIASTOLIC BLOOD PRESSURE: 88 MMHG | BODY MASS INDEX: 45.85 KG/M2 | OXYGEN SATURATION: 98 % | HEART RATE: 60 BPM | HEIGHT: 65 IN

## 2023-03-30 DIAGNOSIS — E78.2 MIXED HYPERLIPIDEMIA: ICD-10-CM

## 2023-03-30 DIAGNOSIS — K21.9 GASTROESOPHAGEAL REFLUX DISEASE, UNSPECIFIED WHETHER ESOPHAGITIS PRESENT: ICD-10-CM

## 2023-03-30 DIAGNOSIS — E06.3 HASHIMOTO'S DISEASE: ICD-10-CM

## 2023-03-30 DIAGNOSIS — E55.9 VITAMIN D DEFICIENCY: ICD-10-CM

## 2023-03-30 DIAGNOSIS — E04.1 NONTOXIC THYROID NODULE: Primary | ICD-10-CM

## 2023-03-30 RX ORDER — PANTOPRAZOLE SODIUM 40 MG/1
40 TABLET, DELAYED RELEASE ORAL DAILY
Qty: 90 TABLET | Refills: 3 | Status: SHIPPED | OUTPATIENT
Start: 2023-03-30

## 2023-03-30 RX ORDER — ATORVASTATIN CALCIUM 40 MG/1
40 TABLET, FILM COATED ORAL DAILY
COMMUNITY
End: 2023-03-30 | Stop reason: SDUPTHER

## 2023-03-30 RX ORDER — ERGOCALCIFEROL 1.25 MG/1
50000 CAPSULE ORAL
Qty: 12 CAPSULE | Refills: 3 | Status: SHIPPED | OUTPATIENT
Start: 2023-03-30

## 2023-03-30 RX ORDER — ATORVASTATIN CALCIUM 40 MG/1
40 TABLET, FILM COATED ORAL DAILY
Qty: 90 TABLET | Refills: 3 | Status: SHIPPED | OUTPATIENT
Start: 2023-03-30

## 2023-03-30 RX ORDER — LEVOTHYROXINE SODIUM 0.1 MG/1
100 TABLET ORAL DAILY
Qty: 90 TABLET | Refills: 3 | Status: SHIPPED | OUTPATIENT
Start: 2023-03-30

## 2023-03-30 NOTE — PROGRESS NOTES
"Chief Complaint  Thyroid Problem and Hashimoto's Thyroiditis    Subjective          Rosa M Ferguson presents to [facility] ENDOCRINOLOGY  History of Present Illness       In office visit     68 year old female presents for follow up         Reason hypothyroidism     Diagnosed in 1997    Timing constant     Quality controlled     Severity moderate     Alleviating factors compliance with brand name Synthroid           Goiter--multinodular         It is stable denies any type of dysphagia compression symptoms         Review of Systems - General ROS: negative            Objective   Vital Signs:   /88   Pulse 60   Ht 165.1 cm (65\")   Wt 125 kg (275 lb 3.2 oz)   SpO2 98%   BMI 45.80 kg/m²     Physical Exam  Constitutional:       Appearance: Normal appearance.   Cardiovascular:      Rate and Rhythm: Regular rhythm.      Heart sounds: Normal heart sounds.   Pulmonary:      Breath sounds: Normal breath sounds.   Musculoskeletal:         General: Normal range of motion.      Cervical back: Normal range of motion and neck supple.   Neurological:      Mental Status: She is alert.        Result Review :   The following data was reviewed by: RACHEL Zabala on 03/17/2022:  Common labs    Common Labs 6/23/22 6/23/22 6/23/22 9/23/22 9/23/22 9/23/22 3/16/23 3/16/23    1206 1206 1206 1407 1407 1407 1218 1218   Glucose 82    79      Glucose       76    BUN 11    13  17    Creatinine 0.75    0.70  0.8    Sodium 141    140  141    Potassium 3.9    3.7  3.9    Chloride 104    104  104    Calcium 9.3    9.6  9.8    Albumin 4.30    4.30  4.3    Total Bilirubin 0.8    0.5  0.69    Alkaline Phosphatase 114    107  102    AST (SGOT) 18    24  20    ALT (SGPT) 15    16  15    WBC   5.06 5.12       Hemoglobin   13.3 13.1       Hematocrit   40.3 38.8       Platelets   241 236       Total Cholesterol  205 (A)    245 (A)     Total Cholesterol        214 (A)   Triglycerides  65    64  73   HDL " Cholesterol  68 (A)    68 (A)  73   LDL Cholesterol   125 (A)    166 (A)  126   (A) Abnormal value       Comments are available for some flowsheets but are not being displayed.                       Assessment and Plan    Diagnoses and all orders for this visit:    1. Nontoxic thyroid nodule (Primary)  -     CBC & Differential; Future  -     Comprehensive Metabolic Panel; Future  -     TSH; Future  -     Vitamin D,25-Hydroxy; Future  -     Vitamin B12; Future  -     Lipid Panel; Future  -     T4, Free; Future  -     T3; Future    2. Hashimoto's disease  -     CBC & Differential; Future  -     Comprehensive Metabolic Panel; Future  -     TSH; Future  -     Vitamin D,25-Hydroxy; Future  -     Vitamin B12; Future  -     Lipid Panel; Future  -     T4, Free; Future  -     T3; Future    3. Mixed hyperlipidemia  -     CBC & Differential; Future  -     Comprehensive Metabolic Panel; Future  -     TSH; Future  -     Vitamin D,25-Hydroxy; Future  -     Vitamin B12; Future  -     Lipid Panel; Future  -     T4, Free; Future  -     T3; Future    4. Vitamin D deficiency  -     CBC & Differential; Future  -     Comprehensive Metabolic Panel; Future  -     TSH; Future  -     Vitamin D,25-Hydroxy; Future  -     Vitamin B12; Future  -     Lipid Panel; Future  -     T4, Free; Future  -     T3; Future    5. Gastroesophageal reflux disease, unspecified whether esophagitis present  -     pantoprazole (PROTONIX) 40 MG EC tablet; Take 1 tablet by mouth Daily.  Dispense: 90 tablet; Refill: 3  -     CBC & Differential; Future  -     Comprehensive Metabolic Panel; Future  -     TSH; Future  -     Vitamin D,25-Hydroxy; Future  -     Vitamin B12; Future  -     Lipid Panel; Future  -     T4, Free; Future  -     T3; Future    Other orders  -     atorvastatin (LIPITOR) 40 MG tablet; Take 1 tablet by mouth Daily.  Dispense: 90 tablet; Refill: 3  -     levothyroxine (SYNTHROID, LEVOTHROID) 100 MCG tablet; Take 1 tablet by mouth Daily.  Dispense: 90  tablet; Refill: 3  -     vitamin D (ERGOCALCIFEROL) 1.25 MG (03870 UT) capsule capsule; Take 1 capsule by mouth Every 7 (Seven) Days.  Dispense: 12 capsule; Refill: 3             Hypothyroidism due to Hashimoto's           Taking Levothyroxine 100 mcg one daily         Lab Results   Component Value Date    TSH 1.67 03/16/2023        --------------------------------------------------------------        Nontoxic multinodular goiter      2012  barium swallow and PFT to evaluate for extrinsic compression were normal          June 2016     Reviewed by Dr. Munoz the left 1.8 cm cystic thyroid nodule did measure 1.5 cm --appears low risk for malignancy repeat in 6 months - scheduled for Jan. 13, 2017--biopsy consistent with underactive thyroid                             Notes Recorded by Harry Person MD on 2/1/2017 at 11:38 PM  Please call patient with results. Let her know that the biopsy was consistent with a diagnosis of underactive thyroid, no malignancy. Her thyroid levels are normal so I suggest we don't change her dose.   Note for Me : FNA states insufficient but her US features are typical of Hashimoto's and the cytology shows lymphocytic infiltration as expected in Hashimoto's so I will not consider it insufficient. There is no need for repeat FNA of the lesion that I targeted since it would only show lymphocytic infiltration. Images clearly document adequate needle placement so mis sampling didn't occur. Pathology should distinguish with expected findings in hashimoto;s vs nondiagnostic.                                 Personally reviewed thyroid ultrasound and left inferior nodule is stable in size, previously 1.8 now 1.9              PROCEDURE: US THYROID     INDICATION:  multiple thyroid nodules, E04.1 Nontoxic single  thyroid nodule     COMPARISON: None     FINDINGS: Realtime grayscale and color-flow imaging is obtained  of the thyroid gland.      ISTHMUS:  Size: 1.1 cm cm     RIGHT:  Size: 6.0 x  2.4 x 2.6 cm     LEFT:  Size: 6.5 x 3.2 x 2.9 cm     NODULES:     Location: Left mid  Size: 3.6 x 1.8 x 2.4 cm  Composition:  solid/almost completely solid (2)  Echogenicity:  isoechoic (1)  Shape:  not taller-than-wide (0)  Margins:  smooth (0)  Echogenic foci:  none (0)  Significant change in size (>/= 20% in two dimensions and minimal  increase of 2 mm): No, alternatively this may represent enlarged  thyroid tissue. No change  ACR TI-RADS total points:  3.  ACR TI-RADS risk category:  TR3 (3 points)  ACR TI-RADS recommendation:  Follow-up ultrasound in 1 year     Location: Left inferior  Size: 1.2 x 1.9 cm  Composition:  solid/almost completely solid (2)  Echogenicity:  hypoechoic (2)  Shape:  not taller-than-wide (0)  Margins:  lobulated/irregular (2)  Echogenic foci:  none (0)  Significant change in size (>/= 20% in two dimensions and minimal  increase of 2 mm): No  ACR TI-RADS risk category:  TR4 (4-6 points)  ACR TI-RADS recommendation:  Ultrasound-guided fine needle  aspiration     Benign cyst measuring 1.3 cm is unchanged.        IMPRESSION:  Left TI-RADS 4 nodule. FNA recommended                 ACR TI-RADS Recommendations:  TR5 (>/=7 points) - FNA if >/=1 cm, follow-up if 0.5 - 0.9 cm  every year for 5 years  TR4 (4-6 points) - FNA if >/=1.5 cm, follow-up if 1 - 1.4 cm in  1, 2, 3 and 5 years  TR3 (3 points) - FNA if >/=2.5 cm, follow -up if 1.5 - 2.4 cm in  1, 3 and 5 years  TR2 (2 points) and TR1 (0 points) - No FNA or follow-up  * ACR TI-RADS recommends that no more than two nodules with the  highest ACR TI-RADS total point should be biopsied and no more  than four nodules should be followed.               -----------------------------------------------------     Adelaide SHORT def -            change to OTC 2000 units daily               Dyslipidemia                 Taking lipitor 40 mg         Total Cholesterol   Date Value Ref Range Status   09/23/2022 245 (H) 0 - 200 mg/dL Final     Triglycerides   Date  Value Ref Range Status   03/16/2023 73 10 - 150 mg/dL Final     HDL Cholesterol   Date Value Ref Range Status   03/16/2023 73 23 - 92 mg/dL Final     LDL Cholesterol    Date Value Ref Range Status   03/16/2023 126 mg/dL Final     Comment:         OPTIMAL: <100 mg/dl  LOW RISK: 100-129 mg/dl  BORDERLINE HIGH: 130-159 mg/dl  HIGH: 160-189 mg/dl  VERY HIGH: >189 mg/dl                    Follow Up   No follow-ups on file.  Patient was given instructions and counseling regarding her condition or for health maintenance advice. Please see specific information pulled into the AVS if appropriate.         This document has been electronically signed by RACHEL Zabala on March 30, 2023 10:51 CDT.      Addendum    2013 compared to 2023    There is a stable left 1.3 cm cyst that has been previously biopsied in 2017 that is stable in size.    Radiologist is describing a new thyroid nodule that measures 4 cm in the left lobe that is spongiform.  This nodule  has been present since 2013 and it represents heterogeneity in the background of thyroiditis    No need to biopsy, no need for more ultrasound

## 2023-09-19 ENCOUNTER — LAB (OUTPATIENT)
Dept: LAB | Facility: HOSPITAL | Age: 68
End: 2023-09-19
Payer: MEDICARE

## 2023-09-19 DIAGNOSIS — E06.3 HASHIMOTO'S DISEASE: ICD-10-CM

## 2023-09-19 DIAGNOSIS — E55.9 VITAMIN D DEFICIENCY: ICD-10-CM

## 2023-09-19 DIAGNOSIS — E04.1 NONTOXIC THYROID NODULE: ICD-10-CM

## 2023-09-19 DIAGNOSIS — K21.9 GASTROESOPHAGEAL REFLUX DISEASE, UNSPECIFIED WHETHER ESOPHAGITIS PRESENT: ICD-10-CM

## 2023-09-19 DIAGNOSIS — E78.2 MIXED HYPERLIPIDEMIA: ICD-10-CM

## 2023-09-19 LAB
25(OH)D3 SERPL-MCNC: 46.1 NG/ML (ref 30–100)
ALBUMIN SERPL-MCNC: 4 G/DL (ref 3.5–5.2)
ALBUMIN/GLOB SERPL: 1.2 G/DL
ALP SERPL-CCNC: 109 U/L (ref 39–117)
ALT SERPL W P-5'-P-CCNC: 12 U/L (ref 1–33)
ANION GAP SERPL CALCULATED.3IONS-SCNC: 11 MMOL/L (ref 5–15)
AST SERPL-CCNC: 18 U/L (ref 1–32)
BASOPHILS # BLD AUTO: 0.02 10*3/MM3 (ref 0–0.2)
BASOPHILS NFR BLD AUTO: 0.4 % (ref 0–1.5)
BILIRUB SERPL-MCNC: 0.6 MG/DL (ref 0–1.2)
BUN SERPL-MCNC: 11 MG/DL (ref 8–23)
BUN/CREAT SERPL: 16.9 (ref 7–25)
CALCIUM SPEC-SCNC: 9.6 MG/DL (ref 8.6–10.5)
CHLORIDE SERPL-SCNC: 105 MMOL/L (ref 98–107)
CHOLEST SERPL-MCNC: 198 MG/DL (ref 0–200)
CO2 SERPL-SCNC: 25 MMOL/L (ref 22–29)
CREAT SERPL-MCNC: 0.65 MG/DL (ref 0.57–1)
DEPRECATED RDW RBC AUTO: 43.9 FL (ref 37–54)
EGFRCR SERPLBLD CKD-EPI 2021: 96.6 ML/MIN/1.73
EOSINOPHIL # BLD AUTO: 0.12 10*3/MM3 (ref 0–0.4)
EOSINOPHIL NFR BLD AUTO: 2.6 % (ref 0.3–6.2)
ERYTHROCYTE [DISTWIDTH] IN BLOOD BY AUTOMATED COUNT: 12.9 % (ref 12.3–15.4)
GLOBULIN UR ELPH-MCNC: 3.4 GM/DL
GLUCOSE SERPL-MCNC: 87 MG/DL (ref 65–99)
HCT VFR BLD AUTO: 38.1 % (ref 34–46.6)
HDLC SERPL-MCNC: 57 MG/DL (ref 40–60)
HGB BLD-MCNC: 12.3 G/DL (ref 12–15.9)
IMM GRANULOCYTES # BLD AUTO: 0 10*3/MM3 (ref 0–0.05)
IMM GRANULOCYTES NFR BLD AUTO: 0 % (ref 0–0.5)
LDLC SERPL CALC-MCNC: 126 MG/DL (ref 0–100)
LDLC/HDLC SERPL: 2.18 {RATIO}
LYMPHOCYTES # BLD AUTO: 2.41 10*3/MM3 (ref 0.7–3.1)
LYMPHOCYTES NFR BLD AUTO: 53 % (ref 19.6–45.3)
MCH RBC QN AUTO: 29.9 PG (ref 26.6–33)
MCHC RBC AUTO-ENTMCNC: 32.3 G/DL (ref 31.5–35.7)
MCV RBC AUTO: 92.5 FL (ref 79–97)
MONOCYTES # BLD AUTO: 0.35 10*3/MM3 (ref 0.1–0.9)
MONOCYTES NFR BLD AUTO: 7.7 % (ref 5–12)
NEUTROPHILS NFR BLD AUTO: 1.65 10*3/MM3 (ref 1.7–7)
NEUTROPHILS NFR BLD AUTO: 36.3 % (ref 42.7–76)
NRBC BLD AUTO-RTO: 0 /100 WBC (ref 0–0.2)
PLATELET # BLD AUTO: 224 10*3/MM3 (ref 140–450)
PMV BLD AUTO: 12.7 FL (ref 6–12)
POTASSIUM SERPL-SCNC: 3.9 MMOL/L (ref 3.5–5.2)
PROT SERPL-MCNC: 7.4 G/DL (ref 6–8.5)
RBC # BLD AUTO: 4.12 10*6/MM3 (ref 3.77–5.28)
SODIUM SERPL-SCNC: 141 MMOL/L (ref 136–145)
T3 SERPL-MCNC: 132 NG/DL (ref 80–200)
T4 FREE SERPL-MCNC: 1.58 NG/DL (ref 0.93–1.7)
TRIGL SERPL-MCNC: 83 MG/DL (ref 0–150)
TSH SERPL DL<=0.05 MIU/L-ACNC: 1.32 UIU/ML (ref 0.27–4.2)
VIT B12 BLD-MCNC: 425 PG/ML (ref 211–946)
VLDLC SERPL-MCNC: 15 MG/DL (ref 5–40)
WBC NRBC COR # BLD: 4.55 10*3/MM3 (ref 3.4–10.8)

## 2023-09-19 PROCEDURE — 84443 ASSAY THYROID STIM HORMONE: CPT

## 2023-09-19 PROCEDURE — 80053 COMPREHEN METABOLIC PANEL: CPT

## 2023-09-19 PROCEDURE — 84439 ASSAY OF FREE THYROXINE: CPT

## 2023-09-19 PROCEDURE — 82607 VITAMIN B-12: CPT

## 2023-09-19 PROCEDURE — 80061 LIPID PANEL: CPT

## 2023-09-19 PROCEDURE — 36415 COLL VENOUS BLD VENIPUNCTURE: CPT

## 2023-09-19 PROCEDURE — 82306 VITAMIN D 25 HYDROXY: CPT

## 2023-09-19 PROCEDURE — 85025 COMPLETE CBC W/AUTO DIFF WBC: CPT

## 2023-09-19 PROCEDURE — 84480 ASSAY TRIIODOTHYRONINE (T3): CPT

## 2025-01-22 NOTE — PROGRESS NOTES
Chief Complaint   Patient presents with   • Constipation     change in bowel habits       Subjective    Rosa M Ferguson is a 62 y.o. female. she is being seen for consultation today at the request of RACHEL Kay   Constipation   This is a chronic problem. The current episode started more than 1 month ago. The problem has been gradually worsening since onset. Her stool frequency is 2 to 3 times per week. The patient is on a high fiber diet. She exercises regularly. There has been adequate water intake. Associated symptoms include bloating. Pertinent negatives include no abdominal pain, anorexia, back pain, diarrhea, difficulty urinating, fecal incontinence, fever, flatus, hemorrhoids, melena, nausea, rectal pain, vomiting or weight loss. Risk factors: Reports symptoms started after pelvic surgery in 2011.  She has tried fiber, diet changes and laxatives for the symptoms. The treatment provided mild relief.    62-year-old female presents to discuss changes in bowel habits and constipation that is worsened in the last few months.  Date symptoms started when she had surgery in 2011 for a prolapsed bladder.  She denies any abdominal pain, nausea, vomiting she has had issues try to use the bathroom for the last 2-3 weeks has tried to increase water exercise and fruit juices and fiber daily.  Has tried over-the-counter MiraLAX it seemed to only and increase her gas.  Has never had a colonoscopy.  Denies any family history of colorectal cancer.  She denies any melena or hematochezia.  Reports since surgery she feels pressure in her vagina when she has an urge to have a bowel movement.  Plan; schedule colonoscopy, lactulose twice a day as needed for constipation.      The following portions of the patient's history were reviewed and updated as appropriate:   Past Medical History   Diagnosis Date   • Acquired hypothyroidism    • Astigmatism    • Contact dermatitis due to plants    • Dyslipidemia    • Elevated blood  pressure reading without diagnosis of hypertension    • Generalized anxiety disorder    • Goiter    • Hashimoto's thyroiditis    • History of varicose veins    • Hypothyroidism    • Multinodular goiter    • Myopia    • Obesity    • Pain in eye      etiol unknown      • Plantar fasciitis    • Rash    • Thyroid nodule    • Upper respiratory infection    • Urinary tract infectious disease    • Vitamin D deficiency      Past Surgical History   Procedure Laterality Date   • Cystoscopy  09/23/1999     Cystoscopy and left retrograde study. Stone passed with uretherocele.   • Injection of medication  06/14/2016     Kenalog (1)     • Tubal abdominal ligation  01/25/1992     Bilateral partial salpingectomy. Desires sterilization.     Family History   Problem Relation Age of Onset   • Breast cancer Other    • Cancer Other    • Coronary artery disease Other    • Diabetes Other    • Cancer Mother    • Heart disease Father    • Stroke Paternal Uncle      OB History     No data available        Current Outpatient Prescriptions   Medication Sig Dispense Refill   • atorvastatin (LIPITOR) 20 MG tablet Take 20 mg by mouth Every Night.     • meloxicam (MOBIC) 15 MG tablet Take 1 tablet by mouth Daily. Take once daily. 30 tablet 0   • thyroid (ARMOUR THYROID) 15 MG tablet Take 3 tabs in the am (45 mg) and 1 tab around 4pm (15mg ) 120 tablet 5   • vitamin D (ERGOCALCIFEROL) 86625 UNITS capsule capsule Take 50,000 Units by mouth Every 7 (Seven) Days.     • lactulose (CHRONULAC) 10 GM/15ML solution Take 15 mL by mouth 2 (Two) Times a Day As Needed (Constipation). 473 mL 1   • sodium-potassium-magnesium sulfates (SUPREP BOWEL PREP) solution oral solution Take 1 bottle by mouth Every 12 (Twelve) Hours. 2 bottle 0   • thyroid 60 MG PO tablet Take 60 mg by mouth Every Morning.       No current facility-administered medications for this visit.      No Known Allergies  Social History     Social History   • Marital status: Single     Spouse name:  "N/A   • Number of children: N/A   • Years of education: N/A     Social History Main Topics   • Smoking status: Never Smoker   • Smokeless tobacco: Not on file   • Alcohol use Yes      Comment: some   • Drug use: No   • Sexual activity: Not on file     Other Topics Concern   • Not on file     Social History Narrative       Review of Systems  Review of Systems   Constitutional: Negative for activity change, appetite change, chills, diaphoresis, fatigue, fever, unexpected weight change and weight loss.   HENT: Negative for sore throat and trouble swallowing.    Respiratory: Negative for shortness of breath.    Gastrointestinal: Positive for bloating and constipation. Negative for abdominal distention, abdominal pain, anal bleeding, anorexia, blood in stool, diarrhea, flatus, hemorrhoids, melena, nausea, rectal pain and vomiting.   Genitourinary: Negative for difficulty urinating.   Musculoskeletal: Negative for arthralgias and back pain.   Skin: Negative for pallor.   Neurological: Negative for light-headedness.        Visit Vitals   • /74   • Pulse 57   • Ht 65\" (165.1 cm)   • Wt 267 lb 1.6 oz (121 kg)   • BMI 44.45 kg/m2       Objective    Physical Exam   Constitutional: She is oriented to person, place, and time. She appears well-developed and well-nourished. She is cooperative. No distress.   HENT:   Head: Normocephalic and atraumatic.   Neck: Normal range of motion. Neck supple. No thyromegaly present.   Cardiovascular: Normal rate, regular rhythm and normal heart sounds.    Pulmonary/Chest: Effort normal and breath sounds normal. She has no wheezes. She has no rhonchi. She has no rales.   Abdominal: Soft. Normal appearance and bowel sounds are normal. She exhibits no shifting dullness, no distension, no fluid wave and no ascites. There is no hepatosplenomegaly. There is no tenderness. There is no rigidity and no guarding. No hernia.   Lymphadenopathy:     She has no cervical adenopathy.   Neurological: She " is alert and oriented to person, place, and time.   Skin: Skin is warm, dry and intact. No rash noted. No pallor.   Psychiatric: She has a normal mood and affect. Her speech is normal.     Hospital Outpatient Visit on 01/25/2017   Component Date Value Ref Range Status   • Case Report 01/25/2017    Final                    Value:Medical Cytology Report                           Case: [id number]                                 Authorizing Provider:  Harry Person,   Collected:           01/25/2017 02:24 PM                                 MD                                                                           Ordering Location:     [facility]             Received:            01/25/2017 02:46 PM                                 [facility]                                                              Pathologist:           Basil Youngblood MD                                                         Specimen:    Thyroid, left                                                                             • Final Diagnosis 01/25/2017    Final                    Value:This result contains rich text formatting which cannot be displayed here.   • Comment 01/25/2017    Final                    Value:This result contains rich text formatting which cannot be displayed here.   • Specimen Adequacy 01/25/2017 Unsatisfactory for evaluation. See comment.   Final    This result contains rich text formatting which cannot be displayed here.     Assessment/Plan      1. Constipation, unspecified constipation type    2. Change in bowel habits    .     Orders placed during this encounter include:      COLONOSCOPY (N/A)    Review and/or summary of lab tests, radiology, procedures, medications. Review and summary of old records and obtaining of history. The risks and benefits of my recommendations, as well as other treatment options were discussed with the patient today. Questions were answered.    New Medications Ordered  This Visit   Medications   • sodium-potassium-magnesium sulfates (SUPREP BOWEL PREP) solution oral solution     Sig: Take 1 bottle by mouth Every 12 (Twelve) Hours.     Dispense:  2 bottle     Refill:  0   • lactulose (CHRONULAC) 10 GM/15ML solution     Sig: Take 15 mL by mouth 2 (Two) Times a Day As Needed (Constipation).     Dispense:  473 mL     Refill:  1       Follow-up: Return in about 4 weeks (around 3/27/2017).          This document has been electronically signed by RACHEL Lowry on February 27, 2017 11:29 AM             Results for orders placed or performed during the hospital encounter of 01/25/17   Fine Needle Aspiration   Result Value Ref Range    Case Report       Medical Cytology Report                           Case: [id number]                                 Authorizing Provider:  Harry Person,   Collected:           01/25/2017 02:24 PM                                 MD                                                                           Ordering Location:     [facility]             Received:            01/25/2017 02:46 PM                                 [facility]                                                              Pathologist:           Bsail Youngblood MD                                                         Specimen:    Thyroid, left                                                                              Final Diagnosis       [place] [facility] Category: NON-DIAGNOSTIC               Comment       Follicular cells not identified. Abundant lymphocytes and colloid present.       Specimen Adequacy Unsatisfactory for evaluation. See comment.     Embedded Images     Results for orders placed or performed during the hospital encounter of 01/04/17   Manual Differential   Result Value Ref Range    Neutrophil Rel % 37 37 - 80 %    Bands Rel %  2 (L) 3 - 5 %    Lymphocyte Rel % 47 10 - 50 %    Monocyte Rel % 11 0 - 12 %    Eosinophil Rel % 1 0 - 7 %     Basophil Rel % 2 0 - 2 %    RBC Morphology Normochromic Slight Anisocytosis Occ Polychromia     Platelet Estimate Normal     Total Counted 100    CBC & Differential   Result Value Ref Range    WBC 4.6 3.2 - 9.8 x1000/uL    RBC 4.34 3.77 - 5.16 mikey/mm3    Hemoglobin 13.1 12.0 - 15.5 gm/dl    Hematocrit 39.1 35.0 - 45.0 %    MCV 90.1 80.0 - 98.0 fl    MCH 30.2 26.0 - 34.0 pg    MCHC 33.5 31.4 - 36.0 gm/dl    RDW 13.2 11.5 - 14.5 %    Platelets 252 150 - 450 x1000/mm3    MPV 10.9 8.0 - 12.0 fl    Neutrophil Rel % 39.4 37.0 - 80.0 %    Lymphocyte Rel % 47.6 10.0 - 50.0 %    Monocyte Rel % 10.4 0.0 - 12.0 %    Eosinophil Rel % 2.4 0.0 - 7.0 %    Basophil Rel % 0.2 0.0 - 2.0 %    Immature Granulocyte Rel % 0.00 0.00 - 0.50 %    Neutrophils Absolute 1.82 (L) 2.00 - 8.60 x1000/uL    Lymphocytes Absolute 2.20 0.60 - 4.20 x1000/uL    Monocytes Absolute 0.48 0.00 - 0.90 x1000/uL    Eosinophils Absolute 0.11 0.00 - 0.70 x1000/uL    Basophils Absolute 0.01 0.00 - 0.20 x1000/uL    Immature Granulocytes Absolute 0.000 (L) 0.005 - 0.022 x1000/uL    nRBC 0.0 0.0 - 0.2 %    nRBC 0.000 x1000/uL   TSH   Result Value Ref Range    TSH 2.92 0.46 - 4.68 uIU/ml   Comprehensive Metabolic Panel   Result Value Ref Range    Sodium 139 137 - 145 mmol/L    Potassium 3.8 3.5 - 5.1 mmol/L    Chloride 104 95 - 110 mmol/L    CO2 23 22 - 31 mmol/L    Anion Gap 12.0 5.0 - 15.0 mmol/L    Glucose 86 60 - 100 mg/dl    BUN 15 7 - 21 mg/dl    Creatinine 0.7 0.5 - 1.0 mg/dl    GFR MDRD Non  85 45 - 104 mL/min/1.73 sq.M    GFR MDRD  103 45 - 104 mL/min/1.73 sq.M    Calcium 9.2 8.4 - 10.2 mg/dl    Total Protein 7.9 6.3 - 8.6 gm/dl    Albumin 3.9 3.4 - 4.8 gm/dl    Total Bilirubin 0.6 0.2 - 1.3 mg/dl    Alkaline Phosphatase 98 38 - 126 U/L    AST (SGOT) 24 14 - 36 U/L    ALT (SGPT) 36 9 - 52 U/L   Results for orders placed or performed during the hospital encounter of 08/26/16   TSH   Result Value Ref Range    TSH 0.43 (L) 0.46  - 4.68 uIU/ml   Results for orders placed or performed during the hospital encounter of 06/07/16   Vitamin D 25 hydroxy   Result Value Ref Range    25 Hydroxy, Vitamin D 24.2 (L) 30.0 - 100.0 ng/ml   Manual Differential   Result Value Ref Range    Neutrophil Rel % 37 37 - 80 %    Lymphocyte Rel % 60 (H) 10 - 50 %    Eosinophil Rel % 3 0 - 7 %    RBC Morphology Normocytic Normochromic     Platelet Estimate Normal     Total Counted 100    CBC and Differential   Result Value Ref Range    WBC 4.7 3.2 - 9.8 x1000/uL    RBC 4.09 3.77 - 5.16 mikey/mm3    Hemoglobin 12.1 12.0 - 15.5 gm/dl    Hematocrit 36.4 35.0 - 45.0 %    MCV 89.0 80.0 - 98.0 fl    MCH 29.6 26.0 - 34.0 pg    MCHC 33.2 31.4 - 36.0 gm/dl    RDW 13.6 11.5 - 14.5 %    Platelets 242 150 - 450 x1000/mm3    MPV 11.2 8.0 - 12.0 fl    Neutrophil Rel % 36.0 (L) 37.0 - 80.0 %    Lymphocyte Rel % 50.1 (H) 10.0 - 50.0 %    Monocyte Rel % 9.7 0.0 - 12.0 %    Eosinophil Rel % 3.8 0.0 - 7.0 %    Basophil Rel % 0.2 0.0 - 2.0 %    Immature Granulocyte Rel % 0.20 0.00 - 0.50 %    Neutrophils Absolute 1.70 (L) 2.00 - 8.60 x1000/uL    Lymphocytes Absolute 2.37 0.60 - 4.20 x1000/uL    Monocytes Absolute 0.46 0.00 - 0.90 x1000/uL    Eosinophils Absolute 0.18 0.00 - 0.70 x1000/uL    Basophils Absolute 0.01 0.00 - 0.20 x1000/uL    Immature Granulocytes Absolute 0.010 0.005 - 0.022 x1000/uL    nRBC 0.0 0.0 - 0.2 %    nRBC 0.000 x1000/uL   TSH   Result Value Ref Range    TSH 0.26 (L) 0.46 - 4.68 uIU/ml   T4, free   Result Value Ref Range    Free T4 1.12 0.78 - 2.19 ng/dl   Lipid panel   Result Value Ref Range    Total Cholesterol 224 (H) 0 - 199 mg/dl    Triglycerides 67 20 - 199 mg/dl    HDL Cholesterol 58 (L) 60 - 200 mg/dl    LDL Cholesterol  153 (H) 0 - 129 mg/dl   Comprehensive metabolic panel   Result Value Ref Range    Sodium 142 137 - 145 mmol/L    Potassium 4.1 3.5 - 5.1 mmol/L    Chloride 103 95 - 110 mmol/L    CO2 26 22 - 31 mmol/L    Anion Gap 13.0 5.0 - 15.0 mmol/L     Glucose 86 60 - 100 mg/dl    BUN 13 7 - 21 mg/dl    Creatinine 0.6 0.5 - 1.0 mg/dl    GFR MDRD Non  102 45 - 104 mL/min/1.73 sq.M    GFR MDRD  123 (H) 45 - 104 mL/min/1.73 sq.M    Calcium 9.5 8.4 - 10.2 mg/dl    Total Protein 7.6 6.3 - 8.6 gm/dl    Albumin 3.9 3.4 - 4.8 gm/dl    Total Bilirubin 0.5 0.2 - 1.3 mg/dl    Alkaline Phosphatase 96 38 - 126 U/L    AST (SGOT) 29 14 - 36 U/L    ALT (SGPT) 39 9 - 52 U/L     *Note: Due to a large number of results and/or encounters for the requested time period, some results have not been displayed. A complete set of results can be found in Results Review.      No

## (undated) DEVICE — CANN SMPL SOFTECH BIFLO ETCO2 A/M 7FT

## (undated) DEVICE — SINGLE-USE BIOPSY FORCEPS: Brand: RADIAL JAW 4